# Patient Record
Sex: MALE | Race: WHITE | NOT HISPANIC OR LATINO | Employment: OTHER | URBAN - METROPOLITAN AREA
[De-identification: names, ages, dates, MRNs, and addresses within clinical notes are randomized per-mention and may not be internally consistent; named-entity substitution may affect disease eponyms.]

---

## 2017-02-22 ENCOUNTER — COMMUNICATION - HEALTHEAST (OUTPATIENT)
Dept: FAMILY MEDICINE | Facility: CLINIC | Age: 62
End: 2017-02-22

## 2017-02-28 ENCOUNTER — OFFICE VISIT - HEALTHEAST (OUTPATIENT)
Dept: FAMILY MEDICINE | Facility: CLINIC | Age: 62
End: 2017-02-28

## 2017-02-28 ENCOUNTER — COMMUNICATION - HEALTHEAST (OUTPATIENT)
Dept: FAMILY MEDICINE | Facility: CLINIC | Age: 62
End: 2017-02-28

## 2017-02-28 DIAGNOSIS — R35.0 FREQUENCY OF URINATION: ICD-10-CM

## 2017-02-28 DIAGNOSIS — E11.9 TYPE 2 DIABETES MELLITUS (H): ICD-10-CM

## 2017-02-28 DIAGNOSIS — E11.9 DM2 (DIABETES MELLITUS, TYPE 2) (H): ICD-10-CM

## 2017-02-28 DIAGNOSIS — R35.0 URINATION FREQUENCY: ICD-10-CM

## 2017-02-28 LAB
CREAT SERPL-MCNC: 2.01 MG/DL (ref 0.7–1.3)
GFR SERPL CREATININE-BSD FRML MDRD: 34 ML/MIN/1.73M2
HBA1C MFR BLD: >14 % (ref 3.5–6)

## 2017-02-28 ASSESSMENT — MIFFLIN-ST. JEOR: SCORE: 2247.22

## 2017-03-02 ENCOUNTER — HOME CARE/HOSPICE - HEALTHEAST (OUTPATIENT)
Dept: HOME HEALTH SERVICES | Facility: HOME HEALTH | Age: 62
End: 2017-03-02

## 2017-03-04 ENCOUNTER — HOME CARE/HOSPICE - HEALTHEAST (OUTPATIENT)
Dept: HOME HEALTH SERVICES | Facility: HOME HEALTH | Age: 62
End: 2017-03-04

## 2017-03-05 ENCOUNTER — COMMUNICATION - HEALTHEAST (OUTPATIENT)
Dept: FAMILY MEDICINE | Facility: CLINIC | Age: 62
End: 2017-03-05

## 2017-03-06 ENCOUNTER — AMBULATORY - HEALTHEAST (OUTPATIENT)
Dept: FAMILY MEDICINE | Facility: CLINIC | Age: 62
End: 2017-03-06

## 2017-03-06 DIAGNOSIS — E11.9 DIABETES MELLITUS (H): ICD-10-CM

## 2017-03-07 ENCOUNTER — COMMUNICATION - HEALTHEAST (OUTPATIENT)
Dept: SCHEDULING | Facility: CLINIC | Age: 62
End: 2017-03-07

## 2017-03-08 ENCOUNTER — OFFICE VISIT - HEALTHEAST (OUTPATIENT)
Dept: FAMILY MEDICINE | Facility: CLINIC | Age: 62
End: 2017-03-08

## 2017-03-08 DIAGNOSIS — L30.4 INTERTRIGO: ICD-10-CM

## 2017-03-08 DIAGNOSIS — E66.01 MORBID OBESITY, UNSPECIFIED OBESITY TYPE (H): ICD-10-CM

## 2017-03-08 DIAGNOSIS — E11.9 DIABETES MELLITUS (H): ICD-10-CM

## 2017-03-08 ASSESSMENT — MIFFLIN-ST. JEOR: SCORE: 2298.54

## 2017-03-09 ENCOUNTER — RECORDS - HEALTHEAST (OUTPATIENT)
Dept: ADMINISTRATIVE | Facility: OTHER | Age: 62
End: 2017-03-09

## 2017-03-13 ENCOUNTER — COMMUNICATION - HEALTHEAST (OUTPATIENT)
Dept: FAMILY MEDICINE | Facility: CLINIC | Age: 62
End: 2017-03-13

## 2017-03-13 ENCOUNTER — OFFICE VISIT - HEALTHEAST (OUTPATIENT)
Dept: FAMILY MEDICINE | Facility: CLINIC | Age: 62
End: 2017-03-13

## 2017-03-13 DIAGNOSIS — E11.9 NEW ONSET TYPE 2 DIABETES MELLITUS (H): ICD-10-CM

## 2017-03-13 ASSESSMENT — MIFFLIN-ST. JEOR: SCORE: 2266.78

## 2017-03-15 ENCOUNTER — COMMUNICATION - HEALTHEAST (OUTPATIENT)
Dept: FAMILY MEDICINE | Facility: CLINIC | Age: 62
End: 2017-03-15

## 2017-03-17 ENCOUNTER — OFFICE VISIT - HEALTHEAST (OUTPATIENT)
Dept: FAMILY MEDICINE | Facility: CLINIC | Age: 62
End: 2017-03-17

## 2017-03-17 ENCOUNTER — AMBULATORY - HEALTHEAST (OUTPATIENT)
Dept: ENDOCRINOLOGY | Facility: CLINIC | Age: 62
End: 2017-03-17

## 2017-03-17 ENCOUNTER — COMMUNICATION - HEALTHEAST (OUTPATIENT)
Dept: FAMILY MEDICINE | Facility: CLINIC | Age: 62
End: 2017-03-17

## 2017-03-17 DIAGNOSIS — E11.9 DIABETES (H): ICD-10-CM

## 2017-03-17 DIAGNOSIS — J32.9 SINUSITIS: ICD-10-CM

## 2017-03-17 DIAGNOSIS — R50.9 FEVER: ICD-10-CM

## 2017-03-17 DIAGNOSIS — E11.9 NEW ONSET TYPE 2 DIABETES MELLITUS (H): ICD-10-CM

## 2017-03-21 ENCOUNTER — RECORDS - HEALTHEAST (OUTPATIENT)
Dept: ADMINISTRATIVE | Facility: OTHER | Age: 62
End: 2017-03-21

## 2017-03-21 ENCOUNTER — AMBULATORY - HEALTHEAST (OUTPATIENT)
Dept: EDUCATION SERVICES | Facility: CLINIC | Age: 62
End: 2017-03-21

## 2017-03-21 DIAGNOSIS — E11.9 NEW ONSET TYPE 2 DIABETES MELLITUS (H): ICD-10-CM

## 2017-03-21 DIAGNOSIS — E11.65 TYPE 2 DIABETES MELLITUS WITH HYPERGLYCEMIA, UNSPECIFIED LONG TERM INSULIN USE STATUS: ICD-10-CM

## 2017-04-07 ENCOUNTER — OFFICE VISIT - HEALTHEAST (OUTPATIENT)
Dept: ENDOCRINOLOGY | Facility: CLINIC | Age: 62
End: 2017-04-07

## 2017-04-07 DIAGNOSIS — E11.9 NEW ONSET TYPE 2 DIABETES MELLITUS (H): ICD-10-CM

## 2017-04-07 ASSESSMENT — MIFFLIN-ST. JEOR: SCORE: 2204.19

## 2017-05-02 ENCOUNTER — OFFICE VISIT - HEALTHEAST (OUTPATIENT)
Dept: EDUCATION SERVICES | Facility: CLINIC | Age: 62
End: 2017-05-02

## 2017-05-02 DIAGNOSIS — E11.65 TYPE 2 DIABETES MELLITUS WITH HYPERGLYCEMIA, UNSPECIFIED LONG TERM INSULIN USE STATUS: ICD-10-CM

## 2017-05-02 DIAGNOSIS — E11.9 NEW ONSET TYPE 2 DIABETES MELLITUS (H): ICD-10-CM

## 2017-05-30 ENCOUNTER — OFFICE VISIT - HEALTHEAST (OUTPATIENT)
Dept: FAMILY MEDICINE | Facility: CLINIC | Age: 62
End: 2017-05-30

## 2017-05-30 DIAGNOSIS — E78.1 HYPERTRIGLYCERIDEMIA: ICD-10-CM

## 2017-05-30 DIAGNOSIS — E11.9 DIABETES (H): ICD-10-CM

## 2017-05-30 DIAGNOSIS — I10 ESSENTIAL HYPERTENSION: ICD-10-CM

## 2017-05-30 LAB
CHOLEST SERPL-MCNC: 136 MG/DL
FASTING STATUS PATIENT QL REPORTED: YES
HBA1C MFR BLD: 7.2 % (ref 3.5–6)
HDLC SERPL-MCNC: 37 MG/DL
LDLC SERPL CALC-MCNC: 86 MG/DL
TRIGL SERPL-MCNC: 65 MG/DL

## 2017-05-30 ASSESSMENT — MIFFLIN-ST. JEOR: SCORE: 2053.59

## 2017-06-03 ENCOUNTER — COMMUNICATION - HEALTHEAST (OUTPATIENT)
Dept: FAMILY MEDICINE | Facility: CLINIC | Age: 62
End: 2017-06-03

## 2017-06-03 DIAGNOSIS — I10 ESSENTIAL HYPERTENSION WITH GOAL BLOOD PRESSURE LESS THAN 130/80: ICD-10-CM

## 2017-06-05 ENCOUNTER — COMMUNICATION - HEALTHEAST (OUTPATIENT)
Dept: FAMILY MEDICINE | Facility: CLINIC | Age: 62
End: 2017-06-05

## 2017-06-20 ENCOUNTER — OFFICE VISIT - HEALTHEAST (OUTPATIENT)
Dept: EDUCATION SERVICES | Facility: CLINIC | Age: 62
End: 2017-06-20

## 2017-06-20 DIAGNOSIS — E11.9 NEW ONSET TYPE 2 DIABETES MELLITUS (H): ICD-10-CM

## 2017-06-26 ENCOUNTER — OFFICE VISIT - HEALTHEAST (OUTPATIENT)
Dept: NURSING | Facility: CLINIC | Age: 62
End: 2017-06-26

## 2017-06-26 DIAGNOSIS — I10 ESSENTIAL HYPERTENSION WITH GOAL BLOOD PRESSURE LESS THAN 140/90: ICD-10-CM

## 2017-06-26 DIAGNOSIS — E11.9 TYPE 2 DIABETES MELLITUS WITHOUT COMPLICATION, WITHOUT LONG-TERM CURRENT USE OF INSULIN (H): ICD-10-CM

## 2017-06-26 DIAGNOSIS — I10 ESSENTIAL HYPERTENSION: ICD-10-CM

## 2017-07-17 ENCOUNTER — COMMUNICATION - HEALTHEAST (OUTPATIENT)
Dept: NURSING | Facility: CLINIC | Age: 62
End: 2017-07-17

## 2017-08-07 ENCOUNTER — OFFICE VISIT - HEALTHEAST (OUTPATIENT)
Dept: NURSING | Facility: CLINIC | Age: 62
End: 2017-08-07

## 2017-08-07 DIAGNOSIS — E11.9 NEW ONSET TYPE 2 DIABETES MELLITUS (H): ICD-10-CM

## 2017-08-07 DIAGNOSIS — R80.9 MICROALBUMINURIA: ICD-10-CM

## 2017-08-29 ENCOUNTER — OFFICE VISIT - HEALTHEAST (OUTPATIENT)
Dept: FAMILY MEDICINE | Facility: CLINIC | Age: 62
End: 2017-08-29

## 2017-08-29 DIAGNOSIS — K43.9 VENTRAL HERNIA WITHOUT OBSTRUCTION OR GANGRENE: ICD-10-CM

## 2017-08-29 DIAGNOSIS — E11.9 DIABETES (H): ICD-10-CM

## 2017-08-29 DIAGNOSIS — I10 ESSENTIAL HYPERTENSION: ICD-10-CM

## 2017-08-29 LAB — HBA1C MFR BLD: 6 % (ref 3.5–6)

## 2017-08-29 ASSESSMENT — MIFFLIN-ST. JEOR: SCORE: 1826.8

## 2017-08-30 ENCOUNTER — COMMUNICATION - HEALTHEAST (OUTPATIENT)
Dept: FAMILY MEDICINE | Facility: CLINIC | Age: 62
End: 2017-08-30

## 2017-09-08 ENCOUNTER — OFFICE VISIT - HEALTHEAST (OUTPATIENT)
Dept: SURGERY | Facility: CLINIC | Age: 62
End: 2017-09-08

## 2017-09-08 DIAGNOSIS — K43.9 VENTRAL HERNIA WITHOUT OBSTRUCTION OR GANGRENE: ICD-10-CM

## 2017-09-14 ENCOUNTER — RECORDS - HEALTHEAST (OUTPATIENT)
Dept: ADMINISTRATIVE | Facility: OTHER | Age: 62
End: 2017-09-14

## 2017-10-17 ENCOUNTER — OFFICE VISIT - HEALTHEAST (OUTPATIENT)
Dept: FAMILY MEDICINE | Facility: CLINIC | Age: 62
End: 2017-10-17

## 2017-10-17 DIAGNOSIS — Z01.818 PREOP EXAMINATION: ICD-10-CM

## 2017-10-17 DIAGNOSIS — Z23 NEED FOR IMMUNIZATION AGAINST INFLUENZA: ICD-10-CM

## 2017-10-17 DIAGNOSIS — I10 ESSENTIAL HYPERTENSION: ICD-10-CM

## 2017-10-17 DIAGNOSIS — K43.9 VENTRAL HERNIA WITHOUT OBSTRUCTION OR GANGRENE: ICD-10-CM

## 2017-10-17 ASSESSMENT — MIFFLIN-ST. JEOR: SCORE: 1751.95

## 2017-11-01 ASSESSMENT — MIFFLIN-ST. JEOR: SCORE: 1741.51

## 2017-11-02 ENCOUNTER — RECORDS - HEALTHEAST (OUTPATIENT)
Dept: ADMINISTRATIVE | Facility: OTHER | Age: 62
End: 2017-11-02

## 2017-11-02 ASSESSMENT — MIFFLIN-ST. JEOR: SCORE: 1735.62

## 2017-11-03 ENCOUNTER — ANESTHESIA - HEALTHEAST (OUTPATIENT)
Dept: SURGERY | Facility: HOSPITAL | Age: 62
End: 2017-11-03

## 2017-11-03 ENCOUNTER — SURGERY - HEALTHEAST (OUTPATIENT)
Dept: SURGERY | Facility: HOSPITAL | Age: 62
End: 2017-11-03

## 2017-11-04 ASSESSMENT — MIFFLIN-ST. JEOR: SCORE: 1765.56

## 2017-11-08 ENCOUNTER — AMBULATORY - HEALTHEAST (OUTPATIENT)
Dept: SURGERY | Facility: CLINIC | Age: 62
End: 2017-11-08

## 2017-11-08 DIAGNOSIS — R11.0 NAUSEA IN ADULT: ICD-10-CM

## 2017-11-13 ENCOUNTER — OFFICE VISIT - HEALTHEAST (OUTPATIENT)
Dept: SURGERY | Facility: CLINIC | Age: 62
End: 2017-11-13

## 2017-11-13 DIAGNOSIS — R19.5 STOOL DISCOLORATION: ICD-10-CM

## 2017-11-13 DIAGNOSIS — Z98.890 S/P REPAIR OF VENTRAL HERNIA: ICD-10-CM

## 2017-11-13 DIAGNOSIS — Z87.19 S/P REPAIR OF VENTRAL HERNIA: ICD-10-CM

## 2017-11-17 ENCOUNTER — OFFICE VISIT - HEALTHEAST (OUTPATIENT)
Dept: SURGERY | Facility: CLINIC | Age: 62
End: 2017-11-17

## 2017-11-17 DIAGNOSIS — Z48.89 POSTOPERATIVE VISIT: ICD-10-CM

## 2017-11-17 ASSESSMENT — MIFFLIN-ST. JEOR: SCORE: 1765.56

## 2017-12-13 ENCOUNTER — OFFICE VISIT - HEALTHEAST (OUTPATIENT)
Dept: FAMILY MEDICINE | Facility: CLINIC | Age: 62
End: 2017-12-13

## 2017-12-13 DIAGNOSIS — E11.9 DIABETES (H): ICD-10-CM

## 2017-12-13 DIAGNOSIS — K56.609 SMALL BOWEL OBSTRUCTION (H): ICD-10-CM

## 2017-12-13 DIAGNOSIS — K43.9 VENTRAL HERNIA: ICD-10-CM

## 2017-12-13 ASSESSMENT — MIFFLIN-ST. JEOR: SCORE: 1751.95

## 2017-12-27 ENCOUNTER — COMMUNICATION - HEALTHEAST (OUTPATIENT)
Dept: FAMILY MEDICINE | Facility: CLINIC | Age: 62
End: 2017-12-27

## 2018-02-05 ENCOUNTER — COMMUNICATION - HEALTHEAST (OUTPATIENT)
Dept: FAMILY MEDICINE | Facility: CLINIC | Age: 63
End: 2018-02-05

## 2018-02-05 ENCOUNTER — OFFICE VISIT - HEALTHEAST (OUTPATIENT)
Dept: FAMILY MEDICINE | Facility: CLINIC | Age: 63
End: 2018-02-05

## 2018-02-05 DIAGNOSIS — I10 ESSENTIAL HYPERTENSION: ICD-10-CM

## 2018-02-05 DIAGNOSIS — E11.9 DIABETES (H): ICD-10-CM

## 2018-02-05 DIAGNOSIS — E11.8 TYPE 2 DIABETES MELLITUS WITH COMPLICATION, WITHOUT LONG-TERM CURRENT USE OF INSULIN (H): ICD-10-CM

## 2018-02-05 LAB — HBA1C MFR BLD: 5.7 % (ref 3.5–6)

## 2018-02-05 ASSESSMENT — MIFFLIN-ST. JEOR: SCORE: 1751.95

## 2018-03-09 ENCOUNTER — COMMUNICATION - HEALTHEAST (OUTPATIENT)
Dept: FAMILY MEDICINE | Facility: CLINIC | Age: 63
End: 2018-03-09

## 2018-03-19 ENCOUNTER — OFFICE VISIT - HEALTHEAST (OUTPATIENT)
Dept: FAMILY MEDICINE | Facility: CLINIC | Age: 63
End: 2018-03-19

## 2018-03-19 DIAGNOSIS — E11.8 TYPE 2 DIABETES MELLITUS WITH COMPLICATION, WITHOUT LONG-TERM CURRENT USE OF INSULIN (H): ICD-10-CM

## 2018-03-19 DIAGNOSIS — L03.115 CELLULITIS OF RIGHT LOWER EXTREMITY: ICD-10-CM

## 2018-03-19 ASSESSMENT — MIFFLIN-ST. JEOR: SCORE: 1772.36

## 2018-03-24 ENCOUNTER — AMBULATORY - HEALTHEAST (OUTPATIENT)
Dept: FAMILY MEDICINE | Facility: CLINIC | Age: 63
End: 2018-03-24

## 2018-03-24 DIAGNOSIS — L03.90 CELLULITIS: ICD-10-CM

## 2018-03-26 ENCOUNTER — OFFICE VISIT - HEALTHEAST (OUTPATIENT)
Dept: FAMILY MEDICINE | Facility: CLINIC | Age: 63
End: 2018-03-26

## 2018-03-26 DIAGNOSIS — E11.8 TYPE 2 DIABETES MELLITUS WITH COMPLICATION, WITHOUT LONG-TERM CURRENT USE OF INSULIN (H): ICD-10-CM

## 2018-03-26 DIAGNOSIS — L03.115 CELLULITIS OF RIGHT LOWER EXTREMITY: ICD-10-CM

## 2018-03-26 DIAGNOSIS — M79.604 PAIN OF RIGHT LOWER EXTREMITY: ICD-10-CM

## 2018-03-26 ASSESSMENT — MIFFLIN-ST. JEOR: SCORE: 1785.97

## 2018-03-31 ENCOUNTER — COMMUNICATION - HEALTHEAST (OUTPATIENT)
Dept: FAMILY MEDICINE | Facility: CLINIC | Age: 63
End: 2018-03-31

## 2018-03-31 DIAGNOSIS — I10 HYPERTENSION: ICD-10-CM

## 2018-04-04 ENCOUNTER — OFFICE VISIT - HEALTHEAST (OUTPATIENT)
Dept: FAMILY MEDICINE | Facility: CLINIC | Age: 63
End: 2018-04-04

## 2018-04-04 DIAGNOSIS — L03.115 CELLULITIS OF RIGHT LOWER EXTREMITY: ICD-10-CM

## 2018-04-04 ASSESSMENT — MIFFLIN-ST. JEOR: SCORE: 1776.9

## 2018-04-06 ENCOUNTER — COMMUNICATION - HEALTHEAST (OUTPATIENT)
Dept: FAMILY MEDICINE | Facility: CLINIC | Age: 63
End: 2018-04-06

## 2018-04-06 ENCOUNTER — HOSPITAL ENCOUNTER (OUTPATIENT)
Dept: RADIOLOGY | Facility: CLINIC | Age: 63
Discharge: HOME | End: 2018-04-06
Attending: FAMILY MEDICINE

## 2018-04-06 ENCOUNTER — HOSPITAL ENCOUNTER (OUTPATIENT)
Dept: ULTRASOUND IMAGING | Facility: CLINIC | Age: 63
Discharge: HOME | End: 2018-04-06
Attending: FAMILY MEDICINE

## 2018-04-06 ENCOUNTER — OFFICE VISIT - HEALTHEAST (OUTPATIENT)
Dept: FAMILY MEDICINE | Facility: CLINIC | Age: 63
End: 2018-04-06

## 2018-04-06 DIAGNOSIS — M79.661 PAIN AND SWELLING OF LOWER LEG, RIGHT: ICD-10-CM

## 2018-04-06 DIAGNOSIS — M79.89 PAIN AND SWELLING OF LOWER LEG, RIGHT: ICD-10-CM

## 2018-04-06 DIAGNOSIS — M79.89 SWELLING OF TOE OF RIGHT FOOT: ICD-10-CM

## 2018-04-06 DIAGNOSIS — L03.115 CELLULITIS OF RIGHT LEG: ICD-10-CM

## 2018-04-06 ASSESSMENT — MIFFLIN-ST. JEOR: SCORE: 1780.3

## 2018-06-18 ENCOUNTER — COMMUNICATION - HEALTHEAST (OUTPATIENT)
Dept: NURSING | Facility: CLINIC | Age: 63
End: 2018-06-18

## 2018-06-18 DIAGNOSIS — E11.9 TYPE 2 DIABETES MELLITUS WITHOUT COMPLICATION, WITHOUT LONG-TERM CURRENT USE OF INSULIN (H): ICD-10-CM

## 2018-06-26 ENCOUNTER — COMMUNICATION - HEALTHEAST (OUTPATIENT)
Dept: FAMILY MEDICINE | Facility: CLINIC | Age: 63
End: 2018-06-26

## 2018-06-26 DIAGNOSIS — E11.9 DM2 (DIABETES MELLITUS, TYPE 2) (H): ICD-10-CM

## 2018-08-04 ENCOUNTER — COMMUNICATION - HEALTHEAST (OUTPATIENT)
Dept: FAMILY MEDICINE | Facility: CLINIC | Age: 63
End: 2018-08-04

## 2018-08-04 DIAGNOSIS — E11.9 DM2 (DIABETES MELLITUS, TYPE 2) (H): ICD-10-CM

## 2018-10-02 ENCOUNTER — OFFICE VISIT - HEALTHEAST (OUTPATIENT)
Dept: FAMILY MEDICINE | Facility: CLINIC | Age: 63
End: 2018-10-02

## 2018-10-02 DIAGNOSIS — E78.5 HYPERLIPIDEMIA LDL GOAL <100: ICD-10-CM

## 2018-10-02 DIAGNOSIS — E11.9 DIABETES MELLITUS, TYPE 2 (H): ICD-10-CM

## 2018-10-02 DIAGNOSIS — E55.9 VITAMIN D DEFICIENCY: ICD-10-CM

## 2018-10-02 DIAGNOSIS — Z23 NEED FOR IMMUNIZATION AGAINST INFLUENZA: ICD-10-CM

## 2018-10-02 LAB
25(OH)D3 SERPL-MCNC: 32.8 NG/ML (ref 30–80)
ALBUMIN SERPL-MCNC: 4.3 G/DL (ref 3.5–5)
ALP SERPL-CCNC: 76 U/L (ref 45–120)
ALT SERPL W P-5'-P-CCNC: 16 U/L (ref 0–45)
ANION GAP SERPL CALCULATED.3IONS-SCNC: 9 MMOL/L (ref 5–18)
AST SERPL W P-5'-P-CCNC: 18 U/L (ref 0–40)
BILIRUB SERPL-MCNC: 0.4 MG/DL (ref 0–1)
BUN SERPL-MCNC: 20 MG/DL (ref 8–22)
CALCIUM SERPL-MCNC: 9.4 MG/DL (ref 8.5–10.5)
CHLORIDE BLD-SCNC: 108 MMOL/L (ref 98–107)
CHOLEST SERPL-MCNC: 136 MG/DL
CO2 SERPL-SCNC: 22 MMOL/L (ref 22–31)
CREAT SERPL-MCNC: 1.06 MG/DL (ref 0.7–1.3)
FASTING STATUS PATIENT QL REPORTED: YES
GFR SERPL CREATININE-BSD FRML MDRD: >60 ML/MIN/1.73M2
GLUCOSE BLD-MCNC: 98 MG/DL (ref 70–125)
HBA1C MFR BLD: 5.9 % (ref 3.5–6)
HDLC SERPL-MCNC: 51 MG/DL
LDLC SERPL CALC-MCNC: 74 MG/DL
POTASSIUM BLD-SCNC: 5.1 MMOL/L (ref 3.5–5)
PROT SERPL-MCNC: 7.1 G/DL (ref 6–8)
SODIUM SERPL-SCNC: 139 MMOL/L (ref 136–145)
TRIGL SERPL-MCNC: 53 MG/DL

## 2018-10-02 ASSESSMENT — MIFFLIN-ST. JEOR: SCORE: 1799.58

## 2018-10-03 ENCOUNTER — COMMUNICATION - HEALTHEAST (OUTPATIENT)
Dept: FAMILY MEDICINE | Facility: CLINIC | Age: 63
End: 2018-10-03

## 2019-03-13 ENCOUNTER — COMMUNICATION - HEALTHEAST (OUTPATIENT)
Dept: FAMILY MEDICINE | Facility: CLINIC | Age: 64
End: 2019-03-13

## 2019-03-19 ENCOUNTER — COMMUNICATION - HEALTHEAST (OUTPATIENT)
Dept: FAMILY MEDICINE | Facility: CLINIC | Age: 64
End: 2019-03-19

## 2019-03-19 DIAGNOSIS — I10 HYPERTENSION: ICD-10-CM

## 2019-05-28 ENCOUNTER — OFFICE VISIT - HEALTHEAST (OUTPATIENT)
Dept: FAMILY MEDICINE | Facility: CLINIC | Age: 64
End: 2019-05-28

## 2019-05-28 DIAGNOSIS — Z23 IMMUNIZATION DUE: ICD-10-CM

## 2019-05-28 DIAGNOSIS — I10 BENIGN ESSENTIAL HTN: ICD-10-CM

## 2019-05-28 DIAGNOSIS — E66.812 CLASS 2 SEVERE OBESITY DUE TO EXCESS CALORIES WITH SERIOUS COMORBIDITY AND BODY MASS INDEX (BMI) OF 35.0 TO 35.9 IN ADULT (H): ICD-10-CM

## 2019-05-28 DIAGNOSIS — E78.5 DYSLIPIDEMIA: ICD-10-CM

## 2019-05-28 DIAGNOSIS — E66.01 CLASS 2 SEVERE OBESITY DUE TO EXCESS CALORIES WITH SERIOUS COMORBIDITY AND BODY MASS INDEX (BMI) OF 35.0 TO 35.9 IN ADULT (H): ICD-10-CM

## 2019-05-28 DIAGNOSIS — E11.9 TYPE 2 DIABETES MELLITUS TREATED WITHOUT INSULIN (H): ICD-10-CM

## 2019-05-28 DIAGNOSIS — Z86.711 HISTORY OF PULMONARY EMBOLISM: ICD-10-CM

## 2019-05-28 DIAGNOSIS — L84 CORN OR CALLUS: ICD-10-CM

## 2019-05-28 DIAGNOSIS — M21.612 BUNION, LEFT: ICD-10-CM

## 2019-05-28 DIAGNOSIS — L84 CALLUS OF FOOT: ICD-10-CM

## 2019-05-28 DIAGNOSIS — E11.8 TYPE 2 DIABETES MELLITUS WITH COMPLICATION, WITHOUT LONG-TERM CURRENT USE OF INSULIN (H): ICD-10-CM

## 2019-05-28 LAB
CHOLEST SERPL-MCNC: 149 MG/DL
CREAT UR-MCNC: 58.5 MG/DL
FASTING STATUS PATIENT QL REPORTED: YES
HBA1C MFR BLD: 6 % (ref 3.5–6)
HDLC SERPL-MCNC: 50 MG/DL
LDLC SERPL CALC-MCNC: 85 MG/DL
MICROALBUMIN UR-MCNC: 1.18 MG/DL (ref 0–1.99)
MICROALBUMIN/CREAT UR: 20.2 MG/G
TRIGL SERPL-MCNC: 69 MG/DL

## 2019-05-28 ASSESSMENT — MIFFLIN-ST. JEOR: SCORE: 1866.56

## 2019-06-10 ENCOUNTER — COMMUNICATION - HEALTHEAST (OUTPATIENT)
Dept: ADMINISTRATIVE | Facility: CLINIC | Age: 64
End: 2019-06-10

## 2019-06-16 ENCOUNTER — COMMUNICATION - HEALTHEAST (OUTPATIENT)
Dept: FAMILY MEDICINE | Facility: CLINIC | Age: 64
End: 2019-06-16

## 2019-06-16 DIAGNOSIS — I10 HYPERTENSION: ICD-10-CM

## 2019-06-25 ENCOUNTER — RECORDS - HEALTHEAST (OUTPATIENT)
Dept: FAMILY MEDICINE | Facility: CLINIC | Age: 64
End: 2019-06-25

## 2019-07-09 ENCOUNTER — COMMUNICATION - HEALTHEAST (OUTPATIENT)
Dept: FAMILY MEDICINE | Facility: CLINIC | Age: 64
End: 2019-07-09

## 2019-07-09 DIAGNOSIS — I10 HYPERTENSION: ICD-10-CM

## 2019-07-09 DIAGNOSIS — E11.9 TYPE 2 DIABETES MELLITUS WITHOUT COMPLICATION, WITHOUT LONG-TERM CURRENT USE OF INSULIN (H): ICD-10-CM

## 2019-08-06 ENCOUNTER — COMMUNICATION - HEALTHEAST (OUTPATIENT)
Dept: FAMILY MEDICINE | Facility: CLINIC | Age: 64
End: 2019-08-06

## 2019-09-17 ENCOUNTER — COMMUNICATION - HEALTHEAST (OUTPATIENT)
Dept: FAMILY MEDICINE | Facility: CLINIC | Age: 64
End: 2019-09-17

## 2019-10-23 ENCOUNTER — RECORDS - HEALTHEAST (OUTPATIENT)
Dept: ADMINISTRATIVE | Facility: OTHER | Age: 64
End: 2019-10-23

## 2019-10-28 ENCOUNTER — RECORDS - HEALTHEAST (OUTPATIENT)
Dept: HEALTH INFORMATION MANAGEMENT | Facility: CLINIC | Age: 64
End: 2019-10-28

## 2019-11-05 ENCOUNTER — OFFICE VISIT - HEALTHEAST (OUTPATIENT)
Dept: FAMILY MEDICINE | Facility: CLINIC | Age: 64
End: 2019-11-05

## 2019-11-05 ENCOUNTER — COMMUNICATION - HEALTHEAST (OUTPATIENT)
Dept: FAMILY MEDICINE | Facility: CLINIC | Age: 64
End: 2019-11-05

## 2019-11-05 DIAGNOSIS — E66.01 CLASS 2 SEVERE OBESITY DUE TO EXCESS CALORIES WITH SERIOUS COMORBIDITY AND BODY MASS INDEX (BMI) OF 35.0 TO 35.9 IN ADULT (H): ICD-10-CM

## 2019-11-05 DIAGNOSIS — E11.8 TYPE 2 DIABETES MELLITUS WITH COMPLICATION, WITHOUT LONG-TERM CURRENT USE OF INSULIN (H): ICD-10-CM

## 2019-11-05 DIAGNOSIS — I10 BENIGN ESSENTIAL HTN: ICD-10-CM

## 2019-11-05 DIAGNOSIS — E66.812 CLASS 2 SEVERE OBESITY DUE TO EXCESS CALORIES WITH SERIOUS COMORBIDITY AND BODY MASS INDEX (BMI) OF 35.0 TO 35.9 IN ADULT (H): ICD-10-CM

## 2019-11-05 DIAGNOSIS — E11.9 TYPE 2 DIABETES MELLITUS TREATED WITHOUT INSULIN (H): ICD-10-CM

## 2019-11-05 LAB
ANION GAP SERPL CALCULATED.3IONS-SCNC: 7 MMOL/L (ref 5–18)
BASOPHILS # BLD AUTO: 0 THOU/UL (ref 0–0.2)
BASOPHILS NFR BLD AUTO: 1 % (ref 0–2)
BUN SERPL-MCNC: 25 MG/DL (ref 8–22)
CALCIUM SERPL-MCNC: 9.6 MG/DL (ref 8.5–10.5)
CHLORIDE BLD-SCNC: 104 MMOL/L (ref 98–107)
CO2 SERPL-SCNC: 28 MMOL/L (ref 22–31)
CREAT SERPL-MCNC: 1.11 MG/DL (ref 0.7–1.3)
EOSINOPHIL # BLD AUTO: 0.1 THOU/UL (ref 0–0.4)
EOSINOPHIL NFR BLD AUTO: 2 % (ref 0–6)
ERYTHROCYTE [DISTWIDTH] IN BLOOD BY AUTOMATED COUNT: 13.6 % (ref 11–14.5)
GFR SERPL CREATININE-BSD FRML MDRD: >60 ML/MIN/1.73M2
GLUCOSE BLD-MCNC: 95 MG/DL (ref 70–125)
HBA1C MFR BLD: 6.2 % (ref 3.5–6)
HCT VFR BLD AUTO: 44.1 % (ref 40–54)
HGB BLD-MCNC: 14.5 G/DL (ref 14–18)
LYMPHOCYTES # BLD AUTO: 1.5 THOU/UL (ref 0.8–4.4)
LYMPHOCYTES NFR BLD AUTO: 25 % (ref 20–40)
MCH RBC QN AUTO: 27.4 PG (ref 27–34)
MCHC RBC AUTO-ENTMCNC: 32.9 G/DL (ref 32–36)
MCV RBC AUTO: 83 FL (ref 80–100)
MONOCYTES # BLD AUTO: 0.5 THOU/UL (ref 0–0.9)
MONOCYTES NFR BLD AUTO: 8 % (ref 2–10)
NEUTROPHILS # BLD AUTO: 4 THOU/UL (ref 2–7.7)
NEUTROPHILS NFR BLD AUTO: 65 % (ref 50–70)
PLATELET # BLD AUTO: 217 THOU/UL (ref 140–440)
PMV BLD AUTO: 7.2 FL (ref 7–10)
POTASSIUM BLD-SCNC: 5 MMOL/L (ref 3.5–5)
RBC # BLD AUTO: 5.29 MILL/UL (ref 4.4–6.2)
SODIUM SERPL-SCNC: 139 MMOL/L (ref 136–145)
WBC: 6.2 THOU/UL (ref 4–11)

## 2019-11-05 ASSESSMENT — MIFFLIN-ST. JEOR: SCORE: 1928.86

## 2019-11-06 ENCOUNTER — COMMUNICATION - HEALTHEAST (OUTPATIENT)
Dept: FAMILY MEDICINE | Facility: CLINIC | Age: 64
End: 2019-11-06

## 2019-11-06 LAB
HCV AB SERPL QL IA: NEGATIVE
HIV 1+2 AB+HIV1 P24 AG SERPL QL IA: NEGATIVE

## 2019-12-20 ENCOUNTER — COMMUNICATION - HEALTHEAST (OUTPATIENT)
Dept: FAMILY MEDICINE | Facility: CLINIC | Age: 64
End: 2019-12-20

## 2019-12-20 DIAGNOSIS — E11.9 DM2 (DIABETES MELLITUS, TYPE 2) (H): ICD-10-CM

## 2019-12-30 ENCOUNTER — COMMUNICATION - HEALTHEAST (OUTPATIENT)
Dept: FAMILY MEDICINE | Facility: CLINIC | Age: 64
End: 2019-12-30

## 2019-12-30 DIAGNOSIS — E11.8 TYPE 2 DIABETES MELLITUS WITH COMPLICATION, WITHOUT LONG-TERM CURRENT USE OF INSULIN (H): ICD-10-CM

## 2019-12-30 DIAGNOSIS — I10 BENIGN ESSENTIAL HTN: ICD-10-CM

## 2020-01-08 ENCOUNTER — COMMUNICATION - HEALTHEAST (OUTPATIENT)
Dept: SCHEDULING | Facility: CLINIC | Age: 65
End: 2020-01-08

## 2020-01-08 DIAGNOSIS — E11.9 DM2 (DIABETES MELLITUS, TYPE 2) (H): ICD-10-CM

## 2020-01-27 ENCOUNTER — COMMUNICATION - HEALTHEAST (OUTPATIENT)
Dept: FAMILY MEDICINE | Facility: CLINIC | Age: 65
End: 2020-01-27

## 2020-01-27 DIAGNOSIS — Z12.11 SPECIAL SCREENING FOR MALIGNANT NEOPLASMS, COLON: ICD-10-CM

## 2020-01-31 ENCOUNTER — RECORDS - HEALTHEAST (OUTPATIENT)
Dept: ADMINISTRATIVE | Facility: OTHER | Age: 65
End: 2020-01-31

## 2020-02-05 ENCOUNTER — AMBULATORY - HEALTHEAST (OUTPATIENT)
Dept: NURSING | Facility: CLINIC | Age: 65
End: 2020-02-05

## 2020-02-05 ENCOUNTER — AMBULATORY - HEALTHEAST (OUTPATIENT)
Dept: LAB | Facility: CLINIC | Age: 65
End: 2020-02-05

## 2020-02-05 DIAGNOSIS — E11.8 TYPE 2 DIABETES MELLITUS WITH COMPLICATION, WITHOUT LONG-TERM CURRENT USE OF INSULIN (H): ICD-10-CM

## 2020-02-05 LAB — HBA1C MFR BLD: 5.9 % (ref 3.5–6)

## 2020-02-06 ENCOUNTER — COMMUNICATION - HEALTHEAST (OUTPATIENT)
Dept: FAMILY MEDICINE | Facility: CLINIC | Age: 65
End: 2020-02-06

## 2020-06-18 ENCOUNTER — COMMUNICATION - HEALTHEAST (OUTPATIENT)
Dept: FAMILY MEDICINE | Facility: CLINIC | Age: 65
End: 2020-06-18

## 2020-06-18 DIAGNOSIS — E11.9 TYPE 2 DIABETES MELLITUS WITHOUT COMPLICATION, WITHOUT LONG-TERM CURRENT USE OF INSULIN (H): ICD-10-CM

## 2020-11-03 ENCOUNTER — OFFICE VISIT - HEALTHEAST (OUTPATIENT)
Dept: FAMILY MEDICINE | Facility: CLINIC | Age: 65
End: 2020-11-03

## 2020-11-03 DIAGNOSIS — E11.8 TYPE 2 DIABETES MELLITUS WITH COMPLICATION, WITHOUT LONG-TERM CURRENT USE OF INSULIN (H): ICD-10-CM

## 2020-11-03 DIAGNOSIS — I10 BENIGN ESSENTIAL HTN: ICD-10-CM

## 2020-11-03 DIAGNOSIS — R35.0 URINE FREQUENCY: ICD-10-CM

## 2020-11-03 DIAGNOSIS — R35.1 NOCTURIA: ICD-10-CM

## 2020-11-03 DIAGNOSIS — E78.5 DYSLIPIDEMIA: ICD-10-CM

## 2020-11-03 DIAGNOSIS — Z12.5 SCREENING FOR PROSTATE CANCER: ICD-10-CM

## 2020-11-03 DIAGNOSIS — R31.29 MICROSCOPIC HEMATURIA: ICD-10-CM

## 2020-11-03 LAB
ALBUMIN SERPL-MCNC: 4.5 G/DL (ref 3.5–5)
ALBUMIN UR-MCNC: NEGATIVE MG/DL
ALP SERPL-CCNC: 89 U/L (ref 45–120)
ALT SERPL W P-5'-P-CCNC: 15 U/L (ref 0–45)
ANION GAP SERPL CALCULATED.3IONS-SCNC: 10 MMOL/L (ref 5–18)
APPEARANCE UR: CLEAR
AST SERPL W P-5'-P-CCNC: 22 U/L (ref 0–40)
BACTERIA #/AREA URNS HPF: ABNORMAL HPF
BILIRUB SERPL-MCNC: 0.4 MG/DL (ref 0–1)
BILIRUB UR QL STRIP: NEGATIVE
BUN SERPL-MCNC: 31 MG/DL (ref 8–22)
CALCIUM SERPL-MCNC: 9 MG/DL (ref 8.5–10.5)
CHLORIDE BLD-SCNC: 104 MMOL/L (ref 98–107)
CHOLEST SERPL-MCNC: 143 MG/DL
CO2 SERPL-SCNC: 26 MMOL/L (ref 22–31)
COLOR UR AUTO: YELLOW
CREAT SERPL-MCNC: 1.16 MG/DL (ref 0.7–1.3)
CREAT UR-MCNC: 96.8 MG/DL
ERYTHROCYTE [DISTWIDTH] IN BLOOD BY AUTOMATED COUNT: 13.2 % (ref 11–14.5)
FASTING STATUS PATIENT QL REPORTED: YES
GFR SERPL CREATININE-BSD FRML MDRD: >60 ML/MIN/1.73M2
GLUCOSE BLD-MCNC: 84 MG/DL (ref 70–125)
GLUCOSE UR STRIP-MCNC: NEGATIVE MG/DL
HBA1C MFR BLD: 6 %
HCT VFR BLD AUTO: 40.6 % (ref 40–54)
HDLC SERPL-MCNC: 59 MG/DL
HGB BLD-MCNC: 13.5 G/DL (ref 14–18)
HGB UR QL STRIP: ABNORMAL
KETONES UR STRIP-MCNC: NEGATIVE MG/DL
LDLC SERPL CALC-MCNC: 75 MG/DL
LEUKOCYTE ESTERASE UR QL STRIP: NEGATIVE
MCH RBC QN AUTO: 27.9 PG (ref 27–34)
MCHC RBC AUTO-ENTMCNC: 33.3 G/DL (ref 32–36)
MCV RBC AUTO: 84 FL (ref 80–100)
MICROALBUMIN UR-MCNC: 1.37 MG/DL (ref 0–1.99)
MICROALBUMIN/CREAT UR: 14.2 MG/G
MUCOUS THREADS #/AREA URNS LPF: ABNORMAL LPF
NITRATE UR QL: NEGATIVE
PH UR STRIP: 5.5 [PH] (ref 5–8)
PLATELET # BLD AUTO: 212 THOU/UL (ref 140–440)
PMV BLD AUTO: 7.4 FL (ref 7–10)
POTASSIUM BLD-SCNC: 4.8 MMOL/L (ref 3.5–5)
PROT SERPL-MCNC: 7.3 G/DL (ref 6–8)
RBC # BLD AUTO: 4.84 MILL/UL (ref 4.4–6.2)
RBC #/AREA URNS AUTO: ABNORMAL HPF
SODIUM SERPL-SCNC: 140 MMOL/L (ref 136–145)
SP GR UR STRIP: 1.02 (ref 1–1.03)
SQUAMOUS #/AREA URNS AUTO: ABNORMAL LPF
TRIGL SERPL-MCNC: 45 MG/DL
UROBILINOGEN UR STRIP-ACNC: ABNORMAL
WBC #/AREA URNS AUTO: ABNORMAL HPF
WBC: 6 THOU/UL (ref 4–11)

## 2020-11-04 LAB — PSA SERPL-MCNC: 0.6 NG/ML (ref 0–4.5)

## 2020-11-27 ENCOUNTER — RECORDS - HEALTHEAST (OUTPATIENT)
Dept: ADMINISTRATIVE | Facility: OTHER | Age: 65
End: 2020-11-27

## 2020-12-26 ENCOUNTER — COMMUNICATION - HEALTHEAST (OUTPATIENT)
Dept: FAMILY MEDICINE | Facility: CLINIC | Age: 65
End: 2020-12-26

## 2020-12-26 DIAGNOSIS — E11.9 TYPE 2 DIABETES MELLITUS WITHOUT COMPLICATION, WITHOUT LONG-TERM CURRENT USE OF INSULIN (H): ICD-10-CM

## 2020-12-28 ENCOUNTER — RECORDS - HEALTHEAST (OUTPATIENT)
Dept: ADMINISTRATIVE | Facility: OTHER | Age: 65
End: 2020-12-28

## 2021-01-25 ENCOUNTER — COMMUNICATION - HEALTHEAST (OUTPATIENT)
Dept: FAMILY MEDICINE | Facility: CLINIC | Age: 66
End: 2021-01-25

## 2021-01-26 ENCOUNTER — COMMUNICATION - HEALTHEAST (OUTPATIENT)
Dept: FAMILY MEDICINE | Facility: CLINIC | Age: 66
End: 2021-01-26

## 2021-01-26 DIAGNOSIS — E11.8 TYPE 2 DIABETES MELLITUS WITH COMPLICATION, WITHOUT LONG-TERM CURRENT USE OF INSULIN (H): ICD-10-CM

## 2021-02-06 ENCOUNTER — HEALTH MAINTENANCE LETTER (OUTPATIENT)
Age: 66
End: 2021-02-06

## 2021-02-14 ENCOUNTER — COMMUNICATION - HEALTHEAST (OUTPATIENT)
Dept: FAMILY MEDICINE | Facility: CLINIC | Age: 66
End: 2021-02-14

## 2021-02-14 DIAGNOSIS — I10 BENIGN ESSENTIAL HTN: ICD-10-CM

## 2021-02-18 ENCOUNTER — COMMUNICATION - HEALTHEAST (OUTPATIENT)
Dept: SCHEDULING | Facility: CLINIC | Age: 66
End: 2021-02-18

## 2021-02-18 ENCOUNTER — OFFICE VISIT - HEALTHEAST (OUTPATIENT)
Dept: FAMILY MEDICINE | Facility: CLINIC | Age: 66
End: 2021-02-18

## 2021-02-18 DIAGNOSIS — E11.9 TYPE 2 DIABETES MELLITUS WITHOUT COMPLICATION, WITHOUT LONG-TERM CURRENT USE OF INSULIN (H): ICD-10-CM

## 2021-02-18 DIAGNOSIS — R50.9 FEVER, UNSPECIFIED FEVER CAUSE: ICD-10-CM

## 2021-02-18 DIAGNOSIS — Z20.822 SUSPECTED 2019 NOVEL CORONAVIRUS INFECTION: ICD-10-CM

## 2021-02-18 LAB
FLUAV AG SPEC QL IA: NORMAL
FLUBV AG SPEC QL IA: NORMAL

## 2021-02-19 ENCOUNTER — COMMUNICATION - HEALTHEAST (OUTPATIENT)
Dept: FAMILY MEDICINE | Facility: CLINIC | Age: 66
End: 2021-02-19

## 2021-02-20 ENCOUNTER — COMMUNICATION - HEALTHEAST (OUTPATIENT)
Dept: CARE COORDINATION | Facility: HOSPITAL | Age: 66
End: 2021-02-20

## 2021-02-20 ENCOUNTER — COMMUNICATION - HEALTHEAST (OUTPATIENT)
Dept: SCHEDULING | Facility: CLINIC | Age: 66
End: 2021-02-20

## 2021-03-30 ENCOUNTER — COMMUNICATION - HEALTHEAST (OUTPATIENT)
Dept: FAMILY MEDICINE | Facility: CLINIC | Age: 66
End: 2021-03-30

## 2021-04-28 ENCOUNTER — OFFICE VISIT - HEALTHEAST (OUTPATIENT)
Dept: FAMILY MEDICINE | Facility: CLINIC | Age: 66
End: 2021-04-28

## 2021-04-28 DIAGNOSIS — E11.9 TYPE 2 DIABETES MELLITUS TREATED WITHOUT INSULIN (H): ICD-10-CM

## 2021-04-28 DIAGNOSIS — R07.9 LEFT-SIDED CHEST PAIN: ICD-10-CM

## 2021-04-28 DIAGNOSIS — R01.1 HEART MURMUR: ICD-10-CM

## 2021-04-28 LAB — HBA1C MFR BLD: 5.9 %

## 2021-04-28 ASSESSMENT — MIFFLIN-ST. JEOR: SCORE: 1808.73

## 2021-05-23 ENCOUNTER — HEALTH MAINTENANCE LETTER (OUTPATIENT)
Age: 66
End: 2021-05-23

## 2021-05-25 ENCOUNTER — OFFICE VISIT - HEALTHEAST (OUTPATIENT)
Dept: FAMILY MEDICINE | Facility: CLINIC | Age: 66
End: 2021-05-25

## 2021-05-25 DIAGNOSIS — E11.9 TYPE 2 DIABETES MELLITUS TREATED WITHOUT INSULIN (H): ICD-10-CM

## 2021-05-25 DIAGNOSIS — E11.8 TYPE 2 DIABETES MELLITUS WITH COMPLICATION, WITHOUT LONG-TERM CURRENT USE OF INSULIN (H): ICD-10-CM

## 2021-05-25 DIAGNOSIS — E66.812 CLASS 2 SEVERE OBESITY DUE TO EXCESS CALORIES WITH SERIOUS COMORBIDITY AND BODY MASS INDEX (BMI) OF 35.0 TO 35.9 IN ADULT (H): ICD-10-CM

## 2021-05-25 DIAGNOSIS — E78.5 DYSLIPIDEMIA: ICD-10-CM

## 2021-05-25 DIAGNOSIS — L84 CALLUS OF FOOT: ICD-10-CM

## 2021-05-25 DIAGNOSIS — E66.01 CLASS 2 SEVERE OBESITY DUE TO EXCESS CALORIES WITH SERIOUS COMORBIDITY AND BODY MASS INDEX (BMI) OF 35.0 TO 35.9 IN ADULT (H): ICD-10-CM

## 2021-05-25 ASSESSMENT — MIFFLIN-ST. JEOR: SCORE: 1771.23

## 2021-05-27 VITALS
RESPIRATION RATE: 21 BRPM | DIASTOLIC BLOOD PRESSURE: 69 MMHG | OXYGEN SATURATION: 97 % | SYSTOLIC BLOOD PRESSURE: 110 MMHG | TEMPERATURE: 99.7 F | HEART RATE: 92 BPM

## 2021-05-29 NOTE — TELEPHONE ENCOUNTER
Refill Approved    Rx renewed per Medication Renewal Policy. Medication was last renewed on 3/21/19.    Natalia Burnette, [facility] Triage/Med Refill 6/17/2019     Requested Prescriptions   Pending Prescriptions Disp Refills     lisinopril (PRINIVIL,ZESTRIL) 20 MG tablet [Pharmacy Med Name: Lisinopril Oral Tablet 20 MG] 90 tablet 0     Sig: Take 1 tablet (20 mg total) by mouth daily.       Ace Inhibitors Refill Protocol Passed - 6/16/2019  7:01 AM        Passed - PCP or prescribing provider visit in past 12 months       Last office visit with prescriber/PCP: 4/6/2018 Tiesha Emmanuel MD OR same dept: 5/28/2019 Martine Ng MD OR same specialty: 5/28/2019 Martine Ng MD  Last physical: Visit date not found Last MTM visit: Visit date not found   Next visit within 3 mo: Visit date not found  Next physical within 3 mo: Visit date not found  Prescriber OR PCP: Tiesha Emmanuel MD  Last diagnosis associated with med order: 1. Hypertension  - lisinopril (PRINIVIL,ZESTRIL) 20 MG tablet [Pharmacy Med Name: Lisinopril Oral Tablet 20 MG]; Take 1 tablet (20 mg total) by mouth daily.  Dispense: 90 tablet; Refill: 0    If protocol passes may refill for 12 months if within 3 months of last provider visit (or a total of 15 months).             Passed - Serum Potassium in past 12 months     Lab Results   Component Value Date    Potassium 5.1 (H) 10/02/2018             Passed - Blood pressure filed in past 12 months     BP Readings from Last 1 Encounters:   05/28/19 120/68             Passed - Serum Creatinine in past 12 months     Creatinine   Date Value Ref Range Status   10/02/2018 1.06 0.70 - 1.30 mg/dL Final

## 2021-05-29 NOTE — PROGRESS NOTES
OFFICE VISIT NOTE      Assessment & Plan   Juan Fleming is a 64 y.o. male here to establish care with me. He is obese but quite active, manages his diabetes very well, takes BP med and cholesterol med and 3 very large calluses and a bunion on his foot. I trimmed down the calluses so he can continue to be active. He will wait to do the bunion surgery since he wants to be in his boat this summer. He will work on losing some weight to help his overall health, but he'll also focus on enjoying life.  Catch up diabetes health maint - eye referral, urine microalb, A1-C today and lipid.  Gave pneumococcal vaccine.  He did get the shingles vaccine already.      Diagnoses and all orders for this visit:    Type 2 diabetes mellitus treated without insulin (H)  -     Glycosylated Hemoglobin A1c  -     Microalbumin, Random Urine  -     Ambulatory referral to Ophthalmology  -     Lipid [place] FASTING    Immunization due  -     Pneumococcal conjugate vaccine 13-valent 6wks-17yrs; >50yrs    Callus of foot    Corn or callus    Bunion, left    Dyslipidemia    Class 2 severe obesity due to excess calories with serious comorbidity and body mass index (BMI) of 35.0 to 35.9 in adult (H)    Type 2 diabetes mellitus with complication, without long-term current use of insulin (H)    Benign essential HTN    History of pulmonary embolism        Martine Ng MD    The following high BMI interventions were performed this visit: encouragement to exercise, exercise promotion: strength training, exercise promotion: stretching and exercise promotion: walking/step counting          Subjective:   Chief Complaint:  Establish Care    64 y.o. male.     1) had shingles shot - 1/19/19 and 3/19/19    2) has a puppy - is active walking the puppy  Walking - was doing 2 miles per day but now will be boating  [place] - this [place] him going, too.  Does not go out when it's extremely cold.    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    3) eating - 3 meals per day - skimpy on breakfast: Activia and Kind bar.  Adjusted once he had diabetes, to 60 carbs/day, lost weight  Eats fresh veggies and fruit - nibbles/grazes at work  Limits red meat, gets fish and tofu  Limits portions  Has treat-food during the day (not before bed)    4) foot problem - has seen the podiatrist who says his toe needs to be straightened, thus he gets a callus  He tore the callus off and got cellulitis last year.    Current Outpatient Medications   Medication Sig     acetaminophen (TYLENOL) 500 MG tablet Take 1,000 mg by mouth every 6 (six) hours as needed for pain.     ascorbic acid, vitamin C, (VITAMIN C) 1000 MG tablet Take 2,000 mg by mouth daily as needed. Daily when patient feels he's getting sick     aspirin 81 MG EC tablet Take 81 mg by mouth daily.     atorvastatin (LIPITOR) 10 MG tablet TAKE ONE TABLET BY MOUTH ONE TIME DAILY      lisinopril (PRINIVIL,ZESTRIL) 20 MG tablet Take 1 tablet (20 mg total) by mouth daily.     ONETOUCH DELICA LANCETS 33 gauge Misc Inject 1 Box under the skin daily before breakfast.     ONETOUCH DELICA LANCETS 33 gauge Misc TEST 4 TIMES DAILY     ONETOUCH VERIO strips TEST 4 TIMES DAILY PER LANCET RX     SHINGRIX, PF, 50 mcg/0.5 mL SusR injection        PSFHx: appropriate sections of PMH completed/filled in   Tobacco Status:  He  reports that he quit smoking about 4 years ago. He smoked 0.50 packs per day. He has never used smokeless tobacco.    Review of Systems:  No fever.  No constipation. All other systems negative except as noted above.    Objective:    /68   Pulse 81   Temp 97.8  F (36.6  C) (Oral)   Resp 12   Ht 5' 9.29\" (1.76 m)   Wt (!) 241 lb 12 oz (109.7 kg)   SpO2 95%   BMI 35.40 kg/m    GENERAL: No acute distress.  Ht: reg s1s2  Lungs: clear - no wheezes or rales    Skin: feet: skin is intact, no sores but 3 very large calluses on the left foot: on the medial side of the bunion, in the " ball of the foot and on the 2nd toe adjacent to the great toe    Spent 40 min face to face with patient with more the 50% spent in counseling, education and coordination of care and discussing obesity, diabetes, hyperlipidemia, bunion and calluses, aspirin therapy.

## 2021-05-30 VITALS — BODY MASS INDEX: 43.43 KG/M2 | HEIGHT: 71 IN | WEIGHT: 310.2 LBS

## 2021-05-30 VITALS — WEIGHT: 315 LBS | HEIGHT: 71 IN | BODY MASS INDEX: 44.1 KG/M2

## 2021-05-30 VITALS — BODY MASS INDEX: 45.19 KG/M2 | WEIGHT: 315 LBS

## 2021-05-30 VITALS — HEIGHT: 70 IN | WEIGHT: 315 LBS | BODY MASS INDEX: 45.1 KG/M2

## 2021-05-30 VITALS — BODY MASS INDEX: 44.1 KG/M2 | HEIGHT: 71 IN | WEIGHT: 315 LBS

## 2021-05-30 VITALS — BODY MASS INDEX: 44.21 KG/M2 | WEIGHT: 315 LBS

## 2021-05-30 NOTE — TELEPHONE ENCOUNTER
Medication Question or Clarification  Who is calling: Patient  What medication are you calling about? (include dose and sig)   atorvastatin (LIPITOR) 10 MG tablet 90 tablet 3 6/19/2018     Sig: TAKE ONE TABLET BY MOUTH ONE TIME DAILY     Sent to pharmacy as: atorvastatin (LIPITOR) 10 MG tablet       Disp Refills Start End    lisinopril (PRINIVIL,ZESTRIL) 20 MG tablet 90 tablet 0 6/17/2019     Sig - Route: Take 1 tablet (20 mg total) by mouth daily. - Oral          Who prescribed the medication?: Dr Izaguirre for the cholesterol medication and Dr Ng for the blood pressure medication.    What is your question/concern?: Patient states that he had his cholesterol done on 5/28/2019 and states someone told him they can not fill the atorvastatin due to he needs a lab done.  Also patient states that the pharmacy told him that he needs a new prescription for lisinopril as the pharmacy did not get the 6/17/2019 one.  Please advise.    He needs both filled.      Pharmacy: [facility]  Balbir to leave a detailed message?: Yes  Site CMT - Please call the pharmacy to obtain any additional needed information.

## 2021-05-31 ENCOUNTER — RECORDS - HEALTHEAST (OUTPATIENT)
Dept: ADMINISTRATIVE | Facility: CLINIC | Age: 66
End: 2021-05-31

## 2021-05-31 VITALS — WEIGHT: 214 LBS | HEIGHT: 70 IN | BODY MASS INDEX: 30.64 KG/M2

## 2021-05-31 VITALS — BODY MASS INDEX: 32.95 KG/M2 | WEIGHT: 236.25 LBS

## 2021-05-31 VITALS — BODY MASS INDEX: 31.78 KG/M2 | WEIGHT: 227 LBS | HEIGHT: 71 IN

## 2021-05-31 VITALS — HEIGHT: 70 IN | WEIGHT: 217 LBS | BODY MASS INDEX: 31.07 KG/M2

## 2021-05-31 VITALS — BODY MASS INDEX: 31.52 KG/M2 | WEIGHT: 226 LBS

## 2021-05-31 VITALS — WEIGHT: 214 LBS | BODY MASS INDEX: 30.64 KG/M2 | HEIGHT: 70 IN

## 2021-05-31 VITALS — BODY MASS INDEX: 38.78 KG/M2 | WEIGHT: 277 LBS | HEIGHT: 71 IN

## 2021-05-31 VITALS — BODY MASS INDEX: 31.07 KG/M2 | HEIGHT: 70 IN | WEIGHT: 217 LBS

## 2021-05-31 VITALS — BODY MASS INDEX: 41 KG/M2 | WEIGHT: 294 LBS

## 2021-05-31 NOTE — TELEPHONE ENCOUNTER
Per 5/28/19 appointment notes, patient to follow up with PCP in 6 months.  No appointment noted.    Ophthalmology referral placed 5/28/19.  Last eye exam in chart is 3/9/17 from [facility].    Left voice message at home number asking patient to call clinic at [phone].  Included date of call in message.    When call returned, help schedule PCP appointment and request eye exam report (done or scheduled?) be faxed to [phone].    Thank you.

## 2021-05-31 NOTE — TELEPHONE ENCOUNTER
Patient Returning Call  Reason for call:  Patient returned missed call  Information relayed to patient:  Relayed message: When call returned, help schedule PCP appointment and request eye exam report (done or scheduled?) be faxed to [phone].    Patient stated he will make an appointment for the eye exam and will callback to schedule an appointment with PCP in the middle to end of September. He appreciates the reminder calls.  Patient has additional questions:  No  If YES, what are your questions/concerns:  n/a  Okay to leave a detailed message?: No call back needed

## 2021-06-01 ENCOUNTER — RECORDS - HEALTHEAST (OUTPATIENT)
Dept: ADMINISTRATIVE | Facility: OTHER | Age: 66
End: 2021-06-01

## 2021-06-01 ENCOUNTER — RECORDS - HEALTHEAST (OUTPATIENT)
Dept: ADMINISTRATIVE | Facility: CLINIC | Age: 66
End: 2021-06-01

## 2021-06-01 VITALS — WEIGHT: 214 LBS | HEIGHT: 70 IN | BODY MASS INDEX: 30.64 KG/M2

## 2021-06-01 VITALS — BODY MASS INDEX: 30.24 KG/M2 | HEIGHT: 71 IN | WEIGHT: 216 LBS

## 2021-06-01 VITALS — BODY MASS INDEX: 30.35 KG/M2 | HEIGHT: 71 IN | WEIGHT: 216.75 LBS

## 2021-06-01 VITALS — HEIGHT: 71 IN | WEIGHT: 215 LBS | BODY MASS INDEX: 30.1 KG/M2

## 2021-06-01 VITALS — WEIGHT: 218 LBS | BODY MASS INDEX: 30.52 KG/M2 | HEIGHT: 71 IN

## 2021-06-01 LAB — RETINOPATHY: NEGATIVE

## 2021-06-01 NOTE — TELEPHONE ENCOUNTER
See message string.      Called to inquire if [facility] has contacted to schedule eye exam and to assist in scheduling f/u appt with PCP.  Referral placed 5/28/19.  Left voice message including clinic phone number and date of call.  Please assist with scheduling if patient returns call.

## 2021-06-01 NOTE — TELEPHONE ENCOUNTER
Patient has scheduled PCP appointment to discuss 9/26/19 [facility] exam.  Successful fax of request (letter) for copy of exam report.

## 2021-06-01 NOTE — TELEPHONE ENCOUNTER
See message string.    Calling to inquire if eye exam has been done or scheduled.  Will encourage patient to schedule mid to late September appt with PCP.    Left voice message asking patient to call clinic re: PCP and eye exam appointments.   Included clinic phone number and date of call in message.

## 2021-06-02 VITALS — BODY MASS INDEX: 30.94 KG/M2 | HEIGHT: 71 IN | WEIGHT: 221 LBS

## 2021-06-03 VITALS
WEIGHT: 256.5 LBS | RESPIRATION RATE: 20 BRPM | TEMPERATURE: 98.1 F | SYSTOLIC BLOOD PRESSURE: 142 MMHG | HEART RATE: 56 BPM | DIASTOLIC BLOOD PRESSURE: 70 MMHG | BODY MASS INDEX: 37.99 KG/M2 | HEIGHT: 69 IN

## 2021-06-03 VITALS — HEIGHT: 69 IN | WEIGHT: 241.75 LBS | BODY MASS INDEX: 35.81 KG/M2

## 2021-06-03 NOTE — TELEPHONE ENCOUNTER
FYI - Status Update  Who is Calling: Patient  Update: Patient called to follow up on this medication.   Okay to leave a detailed message?:  No return call needed

## 2021-06-03 NOTE — TELEPHONE ENCOUNTER
Provider-please send Metformin as recommended by you to the pharmacy below. Thanks.     Requested to send prescription to [facility] # [id number]. Prescription prepped, please review, sign, and send. Thanks.

## 2021-06-03 NOTE — TELEPHONE ENCOUNTER
Please call Juan and let him know his A1-C is 6.2.    I suggest he take metformin 500mg daily while he takes the increased dose of blood pressure medication. The reason is that the metformin might help him lose weight and accomplish his goals sooner. However, if he prefers not to take the metformin, that's up to him. Let me know his preference -I'll only order the metformin if he plans to take it.  (assuming he loses weight in a few months, then he can stop the metformin, just like he can then stop the higher dose of BP med).

## 2021-06-03 NOTE — PROGRESS NOTES
OFFICE VISIT NOTE      Assessment & Plan   Juan Fleming is a 64 y.o. male who has put on weight and not been as active in the past months. He wants to get back to better routines. In the next 3 months, he'll increase his activity and watch his eating, while taking lisinopril WITH hydrochlorothiazide to help his BP be better controlled. He'll also take metformin 500mg daily to help lose weight and control his rising A1-C.  We discussed health choices and risks. I was not 100% convinced he'd stick with healthier choices.    Diagnoses and all orders for this visit:    Benign essential HTN  -     lisinopril-hydrochlorothiazide (ZESTORETIC) 20-12.5 mg per tablet; Take 1 tablet by mouth daily.  Dispense: 30 tablet; Refill: 2  -     Glycosylated Hemoglobin A1c  -     HIV Antigen/Antibody Diagnostic Cascade  -     Hepatitis C Antibody (Anti-HCV)  -     HM1(CBC and Differential)  -     Basic Metabolic Panel  -     HM1 (CBC with Diff)    Class 2 severe obesity due to excess calories with serious comorbidity and body mass index (BMI) of 35.0 to 35.9 in adult (H)  -     lisinopril-hydrochlorothiazide (ZESTORETIC) 20-12.5 mg per tablet; Take 1 tablet by mouth daily.  Dispense: 30 tablet; Refill: 2  -     Glycosylated Hemoglobin A1c  -     HIV Antigen/Antibody Diagnostic Cascade  -     Hepatitis C Antibody (Anti-HCV)  -     HM1(CBC and Differential)  -     HM1 (CBC with Diff)    Type 2 diabetes mellitus treated without insulin (H)  -     Glycosylated Hemoglobin A1c  -     Basic Metabolic Panel        Martine Ng MD    The following high BMI interventions were performed this visit: encouragement to exercise, exercise promotion: strength training, exercise promotion: stretching and exercise promotion: walking/step counting          Subjective:   Chief Complaint:  Follow-up    64 y.o. male.     1) had a busy summer boating and gained weight, but has already started walking and getting better  Got into bad habits - eating a  late, big supper with dessert, less active and did not eat in the mornings.     2) can feel the increased weight whenever he ties his shoes - which is difficult, or when he puts his winter coat on - it used to be roomy but now barely fits.  He is glad he has not gained more weight, but also wants to lose what he's put on.    Current Outpatient Medications   Medication Sig     atorvastatin (LIPITOR) 10 MG tablet Take 10 mg by mouth.     blood glucose test strips TEST 4 TIMES DAILY PER LANCET RX     lancets 33 gauge Misc Inject under the skin.     acetaminophen (TYLENOL) 500 MG tablet Take 1,000 mg by mouth every 6 (six) hours as needed for pain.     acetaminophen (TYLENOL) 500 MG tablet Take 1,000 mg by mouth.     ascorbic acid, vitamin C, (VITAMIN C) 1000 MG tablet Take 2,000 mg by mouth daily as needed. Daily when patient feels he's getting sick     ascorbic acid, vitamin C, (VITAMIN C) 1000 MG tablet Take 2,000 mg by mouth.     aspirin 81 MG EC tablet Take 81 mg by mouth daily.     atorvastatin (LIPITOR) 10 MG tablet Take 1 tablet (10 mg total) by mouth daily.     lisinopril-hydrochlorothiazide (ZESTORETIC) 20-12.5 mg per tablet Take 1 tablet by mouth daily.     metFORMIN (GLUCOPHAGE XR) 500 MG 24 hr tablet Take 1 tablet (500 mg total) by mouth daily with breakfast.     neomycin-polymyxin-hydrocortisone (CORTISPORIN) 3.5-10,000-1 mg/mL-unit/mL-% otic suspension Place 4 Drops into left ear 4 times daily for 7 days.     ONETOUCH DELICA LANCETS 33 gauge Misc Inject 1 Box under the skin daily before breakfast.     ONETOUCH DELICA LANCETS 33 gauge Misc TEST 4 TIMES DAILY     ONETOUCH VERIO strips TEST 4 TIMES DAILY PER LANCET RX     SHINGRIX, PF, 50 mcg/0.5 mL SusR injection        PSFHx: appropriate sections of PMH completed/filled in   Tobacco Status:  He  reports that he quit smoking about 4 years ago. He smoked 0.50 packs per day. He has never used smokeless tobacco.    Review of Systems:  No fever.  No  "constipation. All other systems negative except as noted above.    Objective:    /70   Pulse (!) 56   Temp 98.1  F (36.7  C) (Oral)   Resp 20   Ht 5' 9\" (1.753 m)   Wt (!) 256 lb 8 oz (116.3 kg)   BMI 37.88 kg/m    GENERAL: No acute distress.  HT: reg s1s2  Lungs: clear with fair aeration, no wheezes or rales    Labs pending    Spent 25 min face to face with patient with more the 50% spent in counseling, education and coordination of care and discussing weight, diabetes, blood pressure, activity, nutrition, and pets.        "

## 2021-06-04 VITALS
WEIGHT: 217 LBS | DIASTOLIC BLOOD PRESSURE: 62 MMHG | SYSTOLIC BLOOD PRESSURE: 124 MMHG | BODY MASS INDEX: 32.05 KG/M2 | HEART RATE: 60 BPM

## 2021-06-04 NOTE — TELEPHONE ENCOUNTER
RN cannot approve Refill Request    RN can NOT refill this medication historical medication requested. Last office visit: 11/5/2019 Martine Ng MD Last Physical: Visit date not found Last MTM visit: Visit date not found Last visit same specialty: 11/5/2019 Martine Ng MD.  Next visit within 3 mo: Visit date not found  Next physical within 3 mo: Visit date not found      Cynthia SEGOVIA Leatha, [facility] Triage/Med Refill 12/20/2019    Requested Prescriptions   Pending Prescriptions Disp Refills     blood glucose test (ONETOUCH VERIO) strips 400 strip 3     Sig: TEST 4 TIMES DAILY PER LANCET RX       Diabetic Supplies Refill Protocol Passed - 12/20/2019  7:29 AM        Passed - Visit with PCP or prescribing provider visit in last 6 months     Last office visit with prescriber/PCP: 11/5/2019 Martine Ng MD OR same dept: 11/5/2019 Martine Ng MD OR same specialty: 11/5/2019 Martine Ng MD  Last physical: Visit date not found Last MTM visit: Visit date not found   Next visit within 3 mo: Visit date not found  Next physical within 3 mo: Visit date not found  Prescriber OR PCP: Martine Ng MD  Last diagnosis associated with med order: 1. DM2 (diabetes mellitus, type 2) (H)  - blood glucose test (ONETOUCH VERIO) strips; Dispense brand per patient's insurance at pharmacy discretion.  Dispense: 400 strip; Refill: 3    If protocol passes may refill for 12 months if within 3 months of last provider visit (or a total of 15 months).             Passed - A1C in last 6 months     Hemoglobin A1c   Date Value Ref Range Status   11/05/2019 6.2 (H) 3.5 - 6.0 % Final

## 2021-06-04 NOTE — TELEPHONE ENCOUNTER
FYI - Status Update  Who is Calling: Patient  Update: Patient is out of test strips and requested this ASAP  Okay to leave a detailed message?:  Yes

## 2021-06-05 VITALS
BODY MASS INDEX: 33.92 KG/M2 | HEART RATE: 48 BPM | DIASTOLIC BLOOD PRESSURE: 68 MMHG | WEIGHT: 229 LBS | TEMPERATURE: 97.9 F | OXYGEN SATURATION: 100 % | SYSTOLIC BLOOD PRESSURE: 132 MMHG | HEIGHT: 69 IN

## 2021-06-05 VITALS — SYSTOLIC BLOOD PRESSURE: 136 MMHG | BODY MASS INDEX: 33.97 KG/M2 | DIASTOLIC BLOOD PRESSURE: 80 MMHG | WEIGHT: 230 LBS

## 2021-06-05 NOTE — TELEPHONE ENCOUNTER
Medication Request  Medication name: One Touch Verio Test Strip  Requested Pharmacy: [facility] # [id number]  Reason for request: Historically listed in medication list.  Needs ASAP please  When did you use medication last?:  unknown  Patient offered appointment:  N/A - electronic request  Okay to leave a detailed message: yes

## 2021-06-07 ENCOUNTER — RECORDS - HEALTHEAST (OUTPATIENT)
Dept: HEALTH INFORMATION MANAGEMENT | Facility: CLINIC | Age: 66
End: 2021-06-07

## 2021-06-09 NOTE — PROGRESS NOTES
DIABETES EDUCATION CARE PLAN    Assessment/Plan:     Initial visit for diabetes education and counseling. Juan was diagnosed with diabetes while in the hospital. He was discharged on Lantus and Novolog. He made drastic changes in his eating habits and is losing weight. He is using a phone brett that tracks calories and carbohydrate. Patient is also getting frequent hypoglycemia during the day. After talking to Dr. Izaguirre they discontinued the Novolog and decreased the Lantus. The current dose of Lantus (10 units) is still causing drops in blood sugar. Juan has to eat snacks during the day to avoid low blood sugars. Decreased Lantus by 4 units. I anticipate he will be off insulin this week. Recommended he test his blood sugar to help determine if he needs a diabetes oral medication.     Instructed on carbohydrate counting, label reading and portion control. The recent eating habit changes are consistent with my recommendations today. His wife Sanjana mentioned that when he stopped drinking alcohol a couple of years ago he replaced it with sugar. Juan feels he is addicted to sugar. He has been avoiding all sweets. Suggest he avoid sweets to help lose weight and control his blood sugar.    Diabetes medications originally prescribed:  Lantus Solostar 0-0-0-30  Novolog FlexPen 14-14-14-0    Current insulin doses:  Lantus Solostar 0-0-0-10  No Novolog since 3/17/2017    Plan:  1. Test blood sugar 3 times per day (before breakfast, before dinner and 2 hours after diner).   2. Decrease Basaglar Kwikpen to 6 units at bedtime.  3. If the morning blood sugar is less than 100 for 3 days stop the Basaglar.   4. Aim for 4-5 carbohydrate choices per meal (60-75 gm carb) and 15-30 gm carbohydrate per snack.  5. Bring meter and blood sugar log to Endo visit in April and myself in early May.    Subjective and Objective:     Juan Fleming is a 61 y.o. male referred by Juan Izaguirre MD.  Accompanied by:  Spouse, Sanjana who is a  pharmacist    Wt Readings from Last 3 Encounters:   17 (!) 324 lb (147 kg)   17 (!) 324 lb (147 kg)   17 (!) 331 lb (150.1 kg)     Lab Results   Component Value Date    HGBA1C >14.0 (H) 2017     Physical Activity: Active job part-time (3-4 days per week for 5 hours/day) and swims for 30 minutes twice weekly    Diet/Eating Habits: Eats 3 meals per day and 3 snacks. Meals are around 70-80 gm carbohydrate each  Breakfast: high fiber cereal, milk and fruit, plain coffee  Lunch: sandwich and soup, fruit and plain coffee  Dinner: Meat, potato and vegetable  Snacks: fruit or yogurt or granola bar or 1 package of cheese or peanut butter crackers    SMBG pattern/BG ranges: Uses One Touch Verio Flex meter  Tests 4 times per day  B, 115, 108,129, 136, 102, 129, 124, 105, 125, 113, 67, 137, 118, 114  Food and blood sugar record scanned    Hypoglycemia: once or more per week    Blood sugar goals: -130; Bedtime: 120-140    EDUCATION RECORD    Monitoring   Meter: Assessed and Discussed and Competent  Monitoring: Discussed and Literature provided  BG goals: Discussed and Literature provided    Nutrition Management  Nutrition Management: Discussed  Weight: Assessed and Discussed  Portions/Balance: Assessed and Discussed  Carb ID/Count: Discussed and Literature provided  Label Reading: Discussed and Literature provided  Heart Healthy Fats: Discussed and Literature provided  Physical Activity: Assessed and Discussed  Injected Medications: Discussed   Storage/Exp:Not addressed   Site Rotation: Not addressed     Diabetes Disease Process: Discussed and Literature provided    Acute Complications: Prevent, Detect, Treat:  Hypoglycemia: Assessed and Discussed and Literature provided  Hyperglycemia: Assessed and Discussed and Literature provided  Sick Days: Not addressed  Driving: Not addressed    Chronic Complications  Foot Care:Not addressed  Skin Care: Not addressed  Eye: Not addressed  ABC: Discussed  and Literature provided  Teeth:Not addressed  Goal Setting and Problem Solving: Discussed and Literature provided  Barriers: Assessed  Psychosocial Adjustments: Assessed    Time spent with the patient: 90 minutes   Previous Education: no  Visit Type:Medical Nutrition Therapy, Initial (30801)    Diagnosis per referral:Type 2 Diabetes, uncontrolled E11.65    Cielo Strickland RD, LD, CDE  3/21/2017  8:30 AM

## 2021-06-09 NOTE — TELEPHONE ENCOUNTER
Refill Approved    Rx renewed per Medication Renewal Policy. Medication was last renewed on 7/9/2019.    Kristine Howe, [facility] Connection Triage/Med Refill 6/18/2020     Requested Prescriptions   Pending Prescriptions Disp Refills     atorvastatin (LIPITOR) 10 MG tablet [Pharmacy Med Name: Atorvastatin Calcium Oral Tablet 10 MG] 90 tablet 0     Sig: Take 1 tablet by mouth daily       Statins Refill Protocol (Hmg CoA Reductase Inhibitors) Passed - 6/18/2020  7:03 AM        Passed - PCP or prescribing provider visit in past 12 months      Last office visit with prescriber/PCP: Visit date not found OR same dept: 11/5/2019 Martine Ng MD OR same specialty: 11/5/2019 Martine Ng MD  Last physical: Visit date not found Last MTM visit: Visit date not found   Next visit within 3 mo: Visit date not found  Next physical within 3 mo: Visit date not found  Prescriber OR PCP: Sohan Isabel MD  Last diagnosis associated with med order: 1. Type 2 diabetes mellitus without complication, without long-term current use of insulin (H)  - atorvastatin (LIPITOR) 10 MG tablet [Pharmacy Med Name: Atorvastatin Calcium Oral Tablet 10 MG]; Take 1 tablet by mouth daily  Dispense: 90 tablet; Refill: 0    If protocol passes may refill for 12 months if within 3 months of last provider visit (or a total of 15 months).

## 2021-06-09 NOTE — PROGRESS NOTES
Subjective:   Juan comes in for follow-up of his hospitalization.  He was admitted to the hospital with significantly elevated blood sugars.  He now is on Lantus insulin and NovoLog insulin.  He is checking his sugars.  He feels well.  His sugars are now very well controlled.  They range from 70 up to 120.  He is starting to be more active.  He wants to lose weight.  He has an appointment with the diabetic educator next week.  He routinely is checking his sugars.  Presently he is taking 14 units of NovoLog insulin with meals and 30 units of Lantus insulin at bedtime.  He denies any shortness of breath.  He denies chest pains.  His appetite is good.  He has no nausea or vomiting.  He now has no polyuria or polydipsia which he had prior to his hospitalization.      Objective:  HEENT: Pupils equally round and reactive to light.  EOMs are full.  Fundi are benign.  Pharynx today is clear.  Oral mucosa is moist.  Neck: Neck reveals no lymphadenopathy.  No increased JVD present.  Lungs: Lungs are clear.  Patient's in no respiratory distress.  Cardiac: There is a regular rhythm present.  No murmur heard.  Extremities: There is decreased sensation over the tips of the toes.  Skin integrity normal however.  2+ edema noted in the feet and pretibial areas.  Abdomen: Abdomen is obese but soft.  It is nontender.  Skin: There is erythema noted in the groin bilaterally.  Mild skin desquamation is present.  No exudative process is noted.      Assessment:  1.  Diabetes mellitus.  Presently well controlled on insulin  2.  Candida dermatitis groin  3.  Morbid obesity      Plan:  I had a long discussion with the patient about diabetes.  He will try to lose weight.  I instructed him in the importance of weight loss and blood sugar control.  He will continue present doses of insulin.  He will make sure he takes a small snack between meals since he is on insulin now.  He is seeing a diabetic educator to help instruct him on diet plans.  I  feel he is taking too many carbohydrates at this point in time.  He will follow-up here in 2-3 months to check his A1c.  He will come in sooner for any new questions on diabetes.

## 2021-06-09 NOTE — PROGRESS NOTES
Subjective:   Juan comes in for discussion of his diabetic lab numbers.  He has been checking his sugars at home.  Some of the sugars have been under 50.  He gets shaky at those times.  He needs to take extra carbs during the day.  He has actually been cutting his insulin dose at home himself.  He now is down to 7 units of NovoLog with meals and 30 units of Lantus at bedtime.  He has been losing some weight.  He is trying to exercise more as well.  His polyuria and polydipsia now have dissipated totally.      Objective:  HEENT: Fundi appear benign.  No exudates or hemorrhages noted.  Pharynx is clear.  Cardiac: There is a regular rhythm present.  No ectopy heard.  No murmur present today.  Pulses are strong in upper and lower extremities.  Lungs: Lungs are totally clear.  Patient's in no respiratory distress.  Extremities: Legs have 1+ edema bilaterally.  Skin integrity in the feet is normal.  Pulses are palpable in the dorsal pedal areas.  Sensory exam of the toes is normal today to light touch.      Assessment:  1.  Diabetes mellitus which is well controlled on present insulin doses.  Patient is having some hypoglycemic episodes      Plan:  We will decrease patient's NovoLog insulin 5 units with meals.  He will also cut his Lantus insulin to 20 units at bedtime.  He'll continue checking his sugars.  He will call me next week with those readings.  I will see him back here in one month.  He also will set some weight loss guidelines.  I instructed him and the importance of losing weight to help control sugars as well.  We discussed setting a goal of trying to get on oral medication to control patient's sugars and getting off of the insulin.  He would like to see that happen.

## 2021-06-10 NOTE — PROGRESS NOTES
Subjective:   Juan comes in today for check of his diabetes and blood pressure.  He is now controlling this with diet and exercise only.  He is on no medications for diabetic control.  He denies polyuria or polydipsia.  He has lost 50 pounds since being diagnosed with diabetes.  He feels well.  He exercises routinely.  He denies lightheadedness or dizziness.  He has had no hypoglycemic episodes.      Objective:  HEENT: Pupils equally round and reactive to light.  Fundi are benign.  Lungs: Lungs are clear.  Patient's in no respiratory distress.  Cardiac: There is a regular rhythm present.  No murmur heard.  Extremities: Sensory exam of fingers and toes was totally normal.  Skin integrity normal in the feet.  No edema noted.      Assessment:  1.  Diabetes mellitus which appears in excellent control with diet and exercise  2.  Hypertension in good control  3.  Recent hypertriglyceridemia most likely secondary to hyperglycemia.      Plan:  The patient will have routine lab work done today.  Hemoglobin A1c will be drawn.  He will be called with abnormalities.  He will continue his present treatment for his diabetes.  I congratulated him on his weight control.  Follow-up here in 3 months.

## 2021-06-10 NOTE — PROGRESS NOTES
DIABETES EDUCATION CARE PLAN    Assessment/Plan:     Follow-up visit for diabetes education and counseling. Juan has been off of insulin for close to 2 months. He is working hard on limiting carbohydrates and exercising more. Noted he lost 30 pounds in the past 2 months. All of his blood sugars are within target. He mentioned he is an all or nothing type of person. His long term weight goal is 220 pounds. Patient mentioned he is hungry all of the time. Discussed he can add in snacks if his meals are more than 4-5 hours apart. Provided a healthy snack list. Encouraged him to keep testing his blood sugar to monitor if his diabetes is changing.     Current diabetes medications: none    Plan:  1. Continue to test blood sugar 3 times per day.  2. Continue to aim for 60 gm carbohydrate per meal.   3. Add in small snacks (15-30 gram carbohydrate each) to help control hunger and even out blood sugar.  4. Continue to swim on days off of work.  5. Bring meter and blood sugar log to to next diabetes education visit in June.    Subjective/Objective:      Juan Fleming is a 62 y.o. male referred by Juan Izaguirre MD.  Accompanied by unaccompanied    Wt Readings from Last 3 Encounters:   05/02/17 (!) 294 lb (133.4 kg)   04/07/17 (!) 310 lb 3.2 oz (140.7 kg)   03/21/17 (!) 317 lb (143.8 kg)     Lab Results   Component Value Date    HGBA1C >14.0 (H) 02/28/2017     Diet/Eating Habits: 3 meals per day and no snacks    Physical Activity: swimming for 30 minutes. Active job 5 hours on work days    SMBG pattern/BG ranges: Uses One Touch Verio Flex meter  Tests 3 times per day  Fasting 94, 84, 89, 111, 94, 84, 95  Supper 102, 88, 76, 72, 78, 81  Postprandial 128, 117, 138, 110, 146, 117    Hypoglycemia: none    Blood sugar goals: -130; Bedtime: 120-140    EDUCATION RECORD     Monitoring   Meter (per above goals): Competent  Monitoring: Competent  BG goals: Assessed, Discussed and Competent    Nutrition Management  Nutrition  Management: Discussed  Weight: Discussed  Portions/Balance: Assessed, Discussed, Patient returned demonstration and Competent  Carb ID/Count: Assessed, Discussed, Patient returned demonstration and Competent  Label Reading: Competent  Heart Healthy Fats: Discussed and Competent  Physical Activity: Assessed, Discussed, Patient returned demonstration and Competent    Diabetes Disease Process: Discussed    ABC: Discussed and Competent  Goal Setting and Problem Solving: Assessed, Discussed and Competent  Barriers: Assessed  Psychosocial Adjustments: Assessed    Time spent with the patient: 60 minutes  Visit Type: Medical Nutrition Therapy,  Re-assessment ([id number])  Diagnosis per referral:Type 2 Diabetes, uncontrolled E11.65    Cielo Strickland, XIMENA, LD, CDE  5/2/2017  1:57 PM

## 2021-06-10 NOTE — PROGRESS NOTES
[facility]  ENDOCRINOLOGY    Diabetes Note 4/14/2017    Juan Fleming, 1955, [id number]          Reason for visit      1. New onset type 2 diabetes mellitus        HPI     Juan Fleming is a very pleasant 61 y.o. old male who presents for follow up.  SUMMARY:  Mr Fleming is here today at the behest of his PCP, Dr. Izaguirre, for new onset DM 2.  He was recently hospitalized for DKA and was found with an A1c of >14.  He reports a BG of 748. He had polyuria and polydipsia 2-3 weeks preceding his hospitalization. He was told that his blood sugars were likely elevated for greater than 90 days before.  He was started on insulin in the hospital and has been diligent with his testing and his diet.  Over the last two weeks, he has been having some significant hypoglycemia and has been steady in decline of insulin administration.  He is now using none at all because his blood sugars have all been within range. Looking at his logbook, his blood sugars range between 71 and 127.  He has lost some weight because of the dietary changes.  He is quite active at his job as a  at a [employer].  While there, he states that he is constantly moving for his 5 hour shift.  If he is not at work, he tries to swim.   He states that he had an intestinal rupture 2 years ago and at that time, he quit drinking.  He states that he made up for that with Hagendaz and cookies and his weight ballooned. Current BMI is 43.2 He also has had 2 torn retinas both of which were repaired by laser.        Blood glucose data:      Past Medical History     Patient Active Problem List   Diagnosis     Benign Prostatic Hypertrophy     History of pulmonary embolism     Muscle Spasm     Acrochordon     Essential hypertension     Anxiety     Alcohol abuse     Edema     Large bowel perforation     ARF (acute renal failure)     Anemia associated with acute blood loss     New onset type 2 diabetes mellitus     Hyperosmolar (nonketotic) coma      "Hyperosmolarity due to secondary diabetes mellitus     Morbid obesity due to excess calories     SINDI (acute kidney injury)     Hyponatremia        Family History       family history is not on file.    Social History      reports that he quit smoking about 2 years ago. He smoked 0.50 packs per day. He has never used smokeless tobacco. He reports that he does not drink alcohol or use illicit drugs.      Review of Systems     Patient has no polyuria or polydipsia, no chest pain, dyspnea or TIA's, no numbness, tingling or pain in extremities  Remainder negative except as noted in HPI.    Vital Signs     /80 (Patient Site: Right Arm, Patient Position: Sitting, Cuff Size: Adult Large)  Pulse 74  Ht 5' 11\" (1.803 m)  Wt (!) 310 lb 3.2 oz (140.7 kg)  BMI 43.26 kg/m2  Wt Readings from Last 3 Encounters:   04/07/17 (!) 310 lb 3.2 oz (140.7 kg)   03/21/17 (!) 317 lb (143.8 kg)   03/17/17 (!) 324 lb (147 kg)       Physical Exam     Constitutional:  Well developed, Well nourished  HENT:  Normocephalic,   Neck: Thyroid normal, No lymph nodes, Supple  Eyes:  PERRL, Conjunctiva pink  Respiratory:  Normal breath sounds, No respiratory distress  Cardiovascular:  Normal heart rate, Normal rhythm, No murmurs  GI:  Bowel sounds normal, Soft, No tenderness  Musculoskeletal:  No gross deformity or lesions, normal dorsalis pedis pulses  Skin: No acanthosis nigricans, lipoatrophy or lipodystrophy  Neurologic:  Alert & oriented x 3, nonfocal  Psychiatric:  Affect, Mood, Insight appropriate  Diabetic foot exam: no ulcers, charcot's or high risk calluses, Normal monofilament exam          Assessment     1. New onset type 2 diabetes mellitus        Plan     Certainly I have asked Mr Fleming to continue his dietary practice and activity.  I have also encouraged him to continue testing his blood sugars.  I am unsure if he is in a \"honeymoon\" period as we sometimes see with Type 1 DM.  I would like to see him back as needed, and have " "instructed him to keep in touch with Dr Izaguirre.  He does have a f/u with DME in May.  Instructed to contact me should his blood sugars become unmanageable.  Time spent with pt today: 30 min with >50% spent in counseling and coordination of care.    Ne Dotson  [facility]  4/14/2017  6:45 AM      Lab Results     Hemoglobin A1c   Date Value Ref Range Status   02/28/2017 >14.0 (H) 3.5 - 6.0 % Final     Creatinine   Date Value Ref Range Status   03/02/2017 1.22 0.70 - 1.30 mg/dL Final   03/01/2017 1.51 (H) 0.70 - 1.30 mg/dL Final   03/01/2017 1.30 0.70 - 1.30 mg/dL Final       Cholesterol   Date Value Ref Range Status   03/01/2017 151 <=199 mg/dL Final     HDL Cholesterol   Date Value Ref Range Status   03/01/2017 26 (L) >=40 mg/dL Final     LDL Calculated   Date Value Ref Range Status   03/01/2017 56 <=129 mg/dL Final     Triglycerides   Date Value Ref Range Status   03/01/2017 343 (H) <=149 mg/dL Final       Lab Results   Component Value Date    ALT 22 03/01/2017    ALT 22 03/01/2017    AST 19 03/01/2017    AST 19 03/01/2017    ALKPHOS 104 03/01/2017    ALKPHOS 104 03/01/2017    BILITOT 0.4 03/01/2017    BILITOT 0.4 03/01/2017         Current Medications     Outpatient Medications Prior to Visit   Medication Sig Dispense Refill     aspirin 81 MG EC tablet Take 81 mg by mouth daily.       blood glucose test strips Use 1 each As Directed as needed. Dispense brand per patient's insurance at pharmacy discretion. (Patient taking differently: Use 1 each As Directed 4 (four) times a day. One Touch Verio strips) 400 each prn     fosinopril (MONOPRIL) 20 MG tablet Take 1 tablet (20 mg total) by mouth daily. 90 tablet 3     generic lancets Use 1 each As Directed 4 (four) times a day. Dispense brand per patient's insurance at pharmacy discretion. 400 each prn     insulin syringe-needle U-100 (BD INSULIN SYRINGE) 1 mL 25 gauge x 5/8\" Syrg Use 1 Device As Directed 3 (three) times a day as needed. 100 each 3     LANTUS " "SOLOSTAR 100 unit/mL (3 mL) pen Inject 30 Units under the skin bedtime. Inject 6 units at bedtime. 3 mL PRN     metoprolol succinate (TOPROL-XL) 100 MG 24 hr tablet Take 100 mg by mouth daily.       NOVOLOG FLEXPEN 100 unit/mL injection pen Check blood sugar AC/HS  11.65 Type 2 with hyperglycemia  Flex Pen Needles - BD Ultra-fine Aden Pen Needles - NDC [address]-92 - dispense 1 case, refill PRN for 1 year 3 mL PRN     nystatin (MYCOSTATIN) cream Apply twice daily 30 g 2     ONETOUCH DELICA LANCETS 33 gauge Misc Inject 1 Box under the skin daily before breakfast.       pen needle, diabetic (BD ULTRA-FINE ADEN PEN NEEDLES) 32 gauge x 5/32\" Ndle Use 1 each As Directed 4 (four) times a day. 400 each prn     blood glucose meter (GLUCOMETER) Use 1 each As Directed as needed. Dispense glucometer brand per patient's insurance at pharmacy discretion. 1 each 0     ONETOUCH VERIO SYSTEM Misc Inject 1 Box under the skin three times daily at 7:30am, 11:30am and 4:30pm.       No facility-administered medications prior to visit.            "

## 2021-06-11 NOTE — PROGRESS NOTES
DIABETES CARE PLAN    Assessment/Plan:     Education Assessment: Follow-up visit for diabetes education and counseling. Reviewed A1c and blood sugar goals. Answered questions about nutrition and how to increase HDL. Last A1c decreased significantly and is close to the ADA goal of less than 7.0%. Discussed the relationship between A1c and diabetes complications. Instructed on preventing diabetes complications (tests and exams, foot care, sick days). Instructed on stress management techniques. Provided resources for how to continue to learn about diabetes: web sites, diabetes magazines). Since learning of his diabetes diagnosis patient states he lost 55-65 pounds. His weight goal is 220 pounds. Praised him for his efforts.      Plan:  1. Test blood sugar 2-3 times per day at different times.  2. Continue to limit carbohydrate to 60 grams per meal. If not hungry eating less carbohydrate is OK.  3. Continue to stay active every day.  4. Follow-up with primary care provider.    Objective/Subjective:     Juan Fleming is a 62 y.o. male referred by Juan Izaguirre MD. Accompanied by:unaccompanied. His wife Sanjana helps with healthy eating and counting carbohydrates.  Patient is eating more fruit and vegetables. Sometimes eats less than 60 gm carb/meal  Avoiding all sugary drinks and sweets    Diabetes Medications: None    Blood sugar trends: Uses a One Touch Verio Flex meter  Fasting blood sugar   Before dinner: 80-90's  2 hours after dinner:     BG goals: Before meals ; 2 hours after meals: less 180; Bedtime: 120-140    Lab Results   Component Value Date    HGBA1C 7.2 (H) 05/30/2017     Monitoring   Meter: Discussed, Literature provided, Patient returned demonstration and Competent  Monitoring: Discussed, Literature provided and Competent  BG goals: Discussed and Literature provided and Competent    Nutrition Management  Nutrition Management: Discussed and Competent  Weight: Discussed and  Competent  Portions/Balance: Discussed and Competent  Carb ID/Count: Assessed, Discussed, Literature provided and Competent  Label Reading: Assessed, Discussed Literature provided and Competent  Heart Healthy Fats: Discussed, Literature provided and competent  Menu Planning: Discussed and Literature provided  Dining Out: Discussed and Competent  Physical Activity: Assessed and Competent  Medications: Competent    Diabetes Disease Process: Competent    Stress Management: Discussed, Literature provided and Competent    Acute Complications: Prevent, Detect, Treat:  Hypoglycemia: Assessed, Discussed and Competent  Hyperglycemia: Assessed, Discussed and Competent  Sick Days: Discussed, Literature provided and Competent    Chronic Complications  Foot Care: Discussed, Literature provided and Competent  Skin Care: Discussed, Literature provided and Competent  Eye: Discussed, Literature provided and Competent.  ABC: Discussed, Literature provided and Competent  Teeth: Discussed, Literature provided and Competent  Goal Setting and Problem Solving: Assessed, Discussed and Competent  Barriers: Assessed  Psychosocial Adjustments: Assessed    Time spent with the patient: 60 minutes  Visit Type: Medical Nutrition Therapy, Re-assessment ([id number])  Diagnosis per referral: Type 2 Diabetes, uncontrolled E11.65      Cileo Strickland, RD, LD, CDE  6/20/2017  1:54 PM

## 2021-06-11 NOTE — PROGRESS NOTES
MTM Encounter    Assessment/Plan  Diabetes: Controlled to goal of 7%.  Juan's diabetes is largely diet and exercise controlled.  We discussed the pathophysiology and progression of diabetes.  Reviewed the pros and cons of a GLP-1; Juan to discuss this with his wife who is a pharmacist in the ICU.  Considering Trulicity to assist with weight loss goals and delay progression of diabetes.  Juan would like to remain insulin independent for as long as possible.  Reviewed possible side effects including nausea, gastroparesis, and rarely thyroid and pancreas issues.  Also addressed cost; Juan would be eligible for the $25 co-pay card.  Up-to-date on foot exam and eye exam.  On aspirin is appropriate.  Reviewed benefit of a statin.  His current ASCVD risk is 19%, reduced to 14% with the addition of a moderate intensity statin per [facility] Shared Decisions tool.  - Consider addition of Trulicity  - start atorvastatin 10mg daily    Hypertension: Controlled to goal of less than 140/90.  We discussed stopping metoprolol XL today as Juan has no compelling indications, and this medication is causing mild bradycardia, is likely worsening exercise tolerance and will mask the signs of hypoglycemia.  We will see how his BP looks without it and if he needs a second agent, I would recommend amlodipine.  He has a hx of mild hyponatremia so we should avoid diuretics.  Also switching from fosinipril to lisinopril to reduce cost; slight dose increase.  Juan to check home BP QD or QOD.  - reduce metoprolol XL to 50mg for one week, then 25mg for two weeks  - Stop fosinopril  - Start lisinopril 40mg QD    Follow Up  By phone in three weeks      Subjective/Objective  Juan Fleming is a 62 y.o. male here for a medication therapy management (MTM) appointment; his chief concern today is initial medication review.    Diabetes: Juan's diabetes is diet controlled.  He got influenza and was subsequently diagnosed with diabetes - glucose 780 mg/dL and A1c  of >14%.  He was hospitalized and started on insulin.  He ended up on about 75 units of insulin and has subsequently cut back and is no longer on insulin.    Last A1c: 7.2% on 5/30/17  Glucose readings: Juan checks his glucose 1-2 time(s) per day.  AM fasting range: 100-120  Last microalbumin/creatinine: none on record  Last diabetic eye exam: April '17  Last diabetic foot exam: April '17  On aspirin: Yes  On statin: no  Lifestyle interventions:encouragement to exercise, weight loss from baseline weight and lifestyle education regarding diet.  He has lost about 60 lbs since his diagnosis and he monitors his diet very closely.  60g of carbs or less per meal, working on cutting this back more.  Next weight goal is 250 lbs.    ----------------    Juan's medication list was reviewed with them, discussing reason for use, directions for use, and potential side effects of each medication as needed. Indication, safety, efficacy, and convenience was assessed for all medications addressed above.  No environmental factors were noted currently affecting patient.  This care plan was communicated via EMR with his primary care provider, Juan Izaguirre MD, who is the authorizing prescriber for this visit.  Direct supervision was available by either the patient's PCP or other available provider.  Time and complexity billing metrics are included in the docflowsheet linked to this visit.  Time spent: 65 minutes      Marko Iglesias, Matthew  [facility] Pharmacist  [facility]  [phone]

## 2021-06-12 NOTE — PROGRESS NOTES
OFFICE VISIT NOTE      Assessment & Plan   Juan Fleming is a 65 y.o. male with urinary frequency, especially at night. He does not have a UTI and his prostate is not enlarged. Will refer him to urology for additional work up.    Diagnoses and all orders for this visit:    Urine frequency  -     Urinalysis-UC if Indicated  -     oxybutynin (DITROPAN XL) 5 MG ER tablet; Take 1 tablet (5 mg total) by mouth daily.  Dispense: 30 tablet; Refill: 0  -     Ambulatory referral to Urology  -     PSA, Annual Screen (Prostatic-Specific Antigen)    Type 2 diabetes mellitus with complication, without long-term current use of insulin (H)  -     Urinalysis-UC if Indicated  -     Glycosylated Hemoglobin A1c  -     Comprehensive Metabolic Panel  -     Lipid Cascade FASTING  -     Microalbumin, Random Urine  -     HM2(CBC w/o Differential)    Benign essential HTN  -     Urinalysis-UC if Indicated  -     Comprehensive Metabolic Panel  -     HM2(CBC w/o Differential)    Dyslipidemia  -     Lipid Cascade FASTING    Nocturia  -     oxybutynin (DITROPAN XL) 5 MG ER tablet; Take 1 tablet (5 mg total) by mouth daily.  Dispense: 30 tablet; Refill: 0  -     Ambulatory referral to Urology  -     PSA, Annual Screen (Prostatic-Specific Antigen)    Microscopic hematuria  -     Ambulatory referral to Urology  -     PSA, Annual Screen (Prostatic-Specific Antigen)    Screening for prostate cancer  -     PSA, Annual Screen (Prostatic-Specific Antigen)        Martine Ng MD              Subjective:   Chief Complaint:  No chief complaint on file.    65 y.o. male.     1) peeing often at night - about every 2 hrs.  Remembers a specialist exam years ago - told he has an enlarged prostate  Urine stream during the night is small. During the day it is a good stream. He feels like he forces it out during the night.  Feels very good other than this. Watches his caffeine later in the day, he's cut back on evening water to try to get up less at night. It  does not seem to matter.  Takes a good hour nap during the day because of being tired from interrupted sleep.      Current Outpatient Medications   Medication Sig     acetaminophen (TYLENOL) 500 MG tablet Take 1,000 mg by mouth every 6 (six) hours as needed for pain.     acetaminophen (TYLENOL) 500 MG tablet Take 1,000 mg by mouth.     ascorbic acid, vitamin C, (VITAMIN C) 1000 MG tablet Take 2,000 mg by mouth daily as needed. Daily when patient feels he's getting sick     ascorbic acid, vitamin C, (VITAMIN C) 1000 MG tablet Take 2,000 mg by mouth.     aspirin 81 MG EC tablet Take 81 mg by mouth daily.     atorvastatin (LIPITOR) 10 MG tablet Take 10 mg by mouth.     atorvastatin (LIPITOR) 10 MG tablet Take 1 tablet by mouth daily     blood glucose test (ONETOUCH VERIO) strips TEST 4 TIMES DAILY PER LANCET RX     blood glucose test strips TEST 4 TIMES DAILY PER LANCET RX     lancets 33 gauge Misc Inject under the skin.     lisinopril (PRINIVIL,ZESTRIL) 10 MG tablet Take 1 tablet (10 mg total) by mouth daily.     metFORMIN (GLUCOPHAGE XR) 500 MG 24 hr tablet Take 1 tablet (500 mg total) by mouth daily with breakfast.     neomycin-polymyxin-hydrocortisone (CORTISPORIN) 3.5-10,000-1 mg/mL-unit/mL-% otic suspension Place 4 Drops into left ear 4 times daily for 7 days.     ONETOUCH DELICA LANCETS 33 gauge Misc Inject 1 Box under the skin daily before breakfast.     ONETOUCH DELICA LANCETS 33 gauge Misc TEST 4 TIMES DAILY     oxybutynin (DITROPAN XL) 5 MG ER tablet Take 1 tablet (5 mg total) by mouth daily.     SHINGRIX, PF, 50 mcg/0.5 mL SusR injection        PSFHx: appropriate sections of PMH completed/filled in   Tobacco Status:  He  reports that he quit smoking about 5 years ago. He smoked 0.50 packs per day. He has never used smokeless tobacco.    Review of Systems:  No fever.  No rash. All other systems negative except as noted above.    Objective:    /80   Wt (!) 230 lb (104.3 kg)   BMI 33.97 kg/m     GENERAL: No acute distress.  Ht: reg s1s2  Lungs: clear  Prostate exam: not enlarged, smooth, right side slightly larger than the left    Labs pending    Spent 25 min face to face with patient with more the 50% spent in counseling, education and coordination of care and discussing urination, water intake, medications, referral.

## 2021-06-12 NOTE — PROGRESS NOTES
Subjective:   Juan presents today for check of his diabetes as well as his hypertension.  He would like his blood pressure checked.  He denies any symptoms of high blood pressure.  He has no headaches.  He denies palpitations of any kind.  He checks his pressures outside of the clinic and they are usually in good control.  He states his sugars are well controlled.  Morning sugars are usually around 100.  He thinks an average sugar is about 120.  He denies polyuria or polydipsia.  He also has a mass in the abdomen he wants evaluated.  This is been present for quite a while.  It is getting larger.  It came on after he had an abdominal surgery 3 years ago.  It pains a little bit at times.  It does not cause any other symptoms.  He denies nausea.  He denies bowel changes.  He has had no bladder symptoms of any kind.      Objective:  HEENT: Fundi are benign.  No exudates or hemorrhages noted.  Pharynx clear.  Neck: Neck reveals no increased JVD.  Lungs: Lungs are totally clear.  Patient's in no respiratory distress.  Cardiac: There is a regular rhythm present.  No murmur heard.  Abdomen: There is a mass noted in the left lower quadrant of the abdomen.  This is soft.  It is reducible when the patient lies down.  There is a defect to the left of midline measuring approximately 1-1/2 to 2 cm consistent with a ventral hernia.  Bowel sounds are active throughout.  Extremities: No edema noted in the legs.  Sensory exam of the toes is normal today.      Assessment:  1.  Diabetes mellitus in excellent control on present medications  2.  Hypertension in good control  3.  Ventral hernia/incisional hernia      Plan:  Patient will be referred back to [facility] surgeons.  Dr. Rivera had seen him in the past.  He will make his own appointment with her.  Glycosylated hemoglobin is drawn today.  Follow-up here in 3 months for recheck.   Yes

## 2021-06-12 NOTE — PROGRESS NOTES
MTM Encounter    Assessment/Plan  Hypertension: Controlled to goal of less than 140/90.  Although he has noticed little change with the discontinuation of metoprolol, this will make him more sensitive to hypoglycemia and his pulse is back within normal range.  From a blood pressure perspective we could also potentially discontinue lisinopril.  However he demonstrates very mild proteinuria on today's labs.  -Continue current therapy    Diabetes: Well controlled to goal 7%.  We discussed Trulicity further today.  I told Juan I felt this medication was overkill, especially with the rapid weight loss he has had since the beginning of the year.  I told him we could discuss it again in the future if the need arises, but the significant lifestyle changes he is making are likely to adequately control his diabetes for some time.  - Continue current diet and exercise routine    Follow Up  Three months      Subjective/Objective  Juan Fleming is a 62 y.o. male here for a medication therapy management (MTM) appointment; his chief concern today is hypertension and diabetes.    Diabetes:  Juan's diabetes is currently diet controlled.  Juan has lost around 100 pounds in the past 6 months.  He was previously controlled on insulin, up to 75 units per day.  We had previously discussed the possibility of starting Trulicity.  Last A1c: 7.2%  Glucose readings: Juan checks his glucose three time(s) per day.  AM fasting range:   Last microalbumin/creatinine: 22.5 Aug '17  Last diabetic eye exam: April '17  Last diabetic foot exam: April '17  On aspirin: Yes  On statin: moderate intensity  Lifestyle interventions:weight loss from baseline weight      Hypertension: Juan stopped taking his metoprolol about three weeks ago.  He does not feel any different since stopping them.  ----------------    Juan's medication list was reviewed with them, discussing reason for use, directions for use, and potential side effects of each medication as  needed. Indication, safety, efficacy, and convenience was assessed for all medications addressed above.  No environmental factors were noted currently affecting patient.  This care plan was communicated via EMR with his primary care provider, Juan Izaguirre MD, who is the authorizing prescriber for this visit.  Direct supervision was available by either the patient's PCP or other available provider.  Time and complexity billing metrics are included in the docflowsheet linked to this visit.  Time spent: 20 minutes      Marko Iglesias PharmD  [facility] Pharmacist  [facility]  [phone]

## 2021-06-12 NOTE — PROGRESS NOTES
This is a 62 y.o. gentleman who I'm asked to see by Juan Izaguirre MD  for evaluation of a ventral hernia.  The patient has noticed a bulge for about 2 years now.  There is no pain.  He noticed that fairly quickly after sigmoid colectomy surgery.  It does not hurt but the bulge is becoming more noticeable.  He is also lost a significant amount of weight and that has made the bulge actually more obvious.    See chart review for PMH, Med list, allergies, FH and SH.  Past Medical History:   Diagnosis Date     Alcohol abuse      Diabetes mellitus      Hernia 09/07/2017    Ventral     Hypertension      Influenza      PE (pulmonary embolism)      Perforated bowel        Current Outpatient Prescriptions:      aspirin 81 MG EC tablet, Take 81 mg by mouth daily., Disp: , Rfl:      atorvastatin (LIPITOR) 10 MG tablet, Take 1 tablet (10 mg total) by mouth daily., Disp: 90 tablet, Rfl: 3     blood glucose test strips, Use 1 each As Directed as needed. Dispense brand per patient's insurance at pharmacy discretion. (Patient taking differently: Use 1 each As Directed 4 (four) times a day. One Touch Verio strips), Disp: 400 each, Rfl: prn     generic lancets, Use 1 each As Directed 4 (four) times a day. Dispense brand per patient's insurance at pharmacy discretion., Disp: 400 each, Rfl: prn     lisinopril (PRINIVIL,ZESTRIL) 40 MG tablet, Take 1 tablet (40 mg total) by mouth daily., Disp: 90 tablet, Rfl: 3     nystatin (MYCOSTATIN) cream, Apply twice daily, Disp: 30 g, Rfl: 2     ONETOUCH DELICA LANCETS 33 gauge Misc, Inject 1 Box under the skin daily before breakfast., Disp: , Rfl:   No Known Allergies      Review of systems is negative for full 12 point review of systems.    Physical exam:  /71  Pulse 63  Wt (!) 226 lb (102.5 kg)  SpO2 98%  BMI 31.52 kg/m2  General:alert, appears stated age and cooperative  Lungs: chest clear, no wheezing, rales, normal symmetric air entry, Heart exam - S1, S2 normal, no murmur, no  gallop, rate regular  CV: regular rate and rhythm, S1, S2 normal, no murmur, click, rub or gallop  Abdomen: Mildly obese.  Fairly large ventral hernia in the lower abdomen bulging mostly to the left.  The hernia is easily reducible.      Impression: Fairly large ventral hernia.  I do think repair is indicated.  Discussed the options of laparoscopic versus open.  Because he has a fair amount of excess skin I would actually prefer to do this open so that we can remove some of the excess skin.  Explained the risk and benefits of surgery and he would like to proceed.  He does want to wait till November and is going to try to lose even a little more weight until then.    Plan: Ventral hernia repair with mesh.  We will set him up in November.  He will likely need to spend one night in the hospital.

## 2021-06-13 NOTE — ANESTHESIA POSTPROCEDURE EVALUATION
Patient: Juan Fleming  LAPAROTOMY, LYSIS OF ADHESIONS, MESH EXPLANTATION VENTRAL HERNIA REPAIR WITH MESH   Anesthesia type: general    Patient location: PACU  Last vitals:   Vitals:    11/03/17 1800   BP: 153/77   Pulse: 84   Resp: 17   Temp: 37.6  C (99.7  F)   SpO2: 96%     Post vital signs: stable  Level of consciousness: awake and responds to simple questions  Post-anesthesia pain: pain controlled  Post-anesthesia nausea and vomiting: no  Pulmonary: unassisted, return to baseline  Cardiovascular: stable and blood pressure at baseline  Hydration: adequate  Anesthetic events: no    QCDR Measures:  ASA# 11 - Henna-op Cardiac Arrest: ASA11B - Patient did NOT experience unanticipated cardiac arrest  ASA# 12 - Henna-op Mortality Rate: ASA12B - Patient did NOT die  ASA# 13 - PACU Re-Intubation Rate: ASA13B - Patient did NOT require a new airway mgmt  ASA# 10 - Composite Anes Safety: ASA10A - No serious adverse event    Additional Notes:

## 2021-06-13 NOTE — ANESTHESIA CARE TRANSFER NOTE
Last vitals:   Vitals:    11/03/17 1510   BP: 151/84   Pulse: 84   Resp: 16   Temp: 36.9  C (98.5  F)   SpO2: 95%     Patient's level of consciousness is drowsy  Spontaneous respirations: yes  Maintains airway independently: yes  Dentition unchanged: yes  Oropharynx: oropharynx clear of all foreign objects    QCDR Measures:  ASA# 20 - Surgical Safety Checklist: WHO surgical safety checklist completed prior to induction  PQRS# 430 - Adult PONV Prevention: 4558F - Pt received => 2 anti-emetic agents (different classes) preop & intraop  ASA# 8 - Peds PONV Prevention: NA - Not pediatric patient, not GA or 2 or more risk factors NOT present  PQRS# 424 - Henna-op Temp Management: 4559F - At least one body temp DOCUMENTED => 35.5C or 95.9F within required timeframe  PQRS# 426 - PACU Transfer Protocol: - Transfer of care checklist used  ASA# 14 - Acute Post-op Pain: ASA14B - Patient did NOT experience pain >= 7 out of 10

## 2021-06-13 NOTE — ANESTHESIA PREPROCEDURE EVALUATION
Anesthesia Evaluation      Patient summary reviewed   No history of anesthetic complications     Airway   Mallampati: II  Neck ROM: full   Pulmonary - normal exam   (+) a smoker (former)                         Cardiovascular - normal exam  (+) hypertension well controlled, ,      Neuro/Psych      Comments: H/o alcohol abuse    Endo/Other    (+) diabetes mellitus well controlled,      GI/Hepatic/Renal    (+)   chronic renal disease,      Other findings: SBO, NGT in place      Dental    (+) bridge                       Anesthesia Plan  Planned anesthetic: general endotracheal  - RSI, sux  - bilateral TAP block post op  ASA 3 - emergent   Induction: intravenous   Anesthetic plan and risks discussed with: patient  Anesthesia plan special considerations: video-assisted, rapid sequence induction,   Post-op plan: routine recovery

## 2021-06-13 NOTE — PROGRESS NOTES
Assessment/Plan:      Visit for Preoperative Exam.   Ventral hernia  Diabetes mellitus type 2 controlled with diet  History of alcohol abuse  Hypertension  Past history of pulmonary embolism    Patient approved for surgery with general or local anesthesia. Postoperative pain to be managed by surgeon during post-operative [employer] timeframe, typically 30-60 days for major surgery, and less for others. Labs will be done as indicated. Above recommendations were reviewed with the patient. Copy of the pre-op was given to the patient to bring along on the day of surgery. Follow up as needed. Proceed with proposed surgery without additional clinical clarifications. No Cardiology consultation or non-invasive testing. Low Risk Surgery. No active cardiac conditions.  BMP and hemogram are done today.  EKG is also copied for the patient.  Results will be faxed to surgery prior to this procedure.  There are no contraindications to this procedure per my examination today.    Subjective:   Patient has noticed a mass in the lower abdomen over the past year or 2.  It is enlarging.  It is now starting to bother him and get slightly painful.  He denies any bowel changes with this.  He is now scheduled for ventral hernia repair.    Scheduled Procedure: Ventral hernia repair.  Surgery Date: 11/6/2017  Surgery Location: [facility]  Surgeon: Dr. Maxine Rivera    Current Outpatient Prescriptions   Medication Sig Dispense Refill     aspirin 81 MG EC tablet Take 81 mg by mouth daily.       atorvastatin (LIPITOR) 10 MG tablet Take 1 tablet (10 mg total) by mouth daily. 90 tablet 3     blood glucose test strips Use 1 each As Directed as needed. Dispense brand per patient's insurance at pharmacy discretion. (Patient taking differently: Use 1 each As Directed 4 (four) times a day. One Touch Verio strips) 400 each prn     generic lancets Use 1 each As Directed 4 (four) times a day. Dispense brand per patient's insurance at  pharmacy discretion. 400 each prn     lisinopril (PRINIVIL,ZESTRIL) 40 MG tablet Take 1 tablet (40 mg total) by mouth daily. 90 tablet 3     ONETOUCH DELICA LANCETS 33 gauge Misc Inject 1 Box under the skin daily before breakfast.       nystatin (MYCOSTATIN) cream Apply twice daily 30 g 2     No current facility-administered medications for this visit.        No Known Allergies    Immunization History   Administered Date(s) Administered     Influenza, Seasonal, Inj PF 10/15/2013     Influenza, inj, historic 09/21/2016     Influenza, seasonal,quad inj 36+ mos 10/10/2015     Influenza,seasonal quad, PF 10/13/2014     Td, historic 01/06/2006     Tdap 06/15/2016       Patient Active Problem List   Diagnosis     Benign Prostatic Hypertrophy     History of pulmonary embolism     Muscle Spasm     Acrochordon     Essential hypertension     Anxiety     Alcohol abuse     Edema     Large bowel perforation     ARF (acute renal failure)     Anemia associated with acute blood loss     New onset type 2 diabetes mellitus     Hyperosmolar (nonketotic) coma     Hyperosmolarity due to secondary diabetes mellitus     Morbid obesity due to excess calories     SINDI (acute kidney injury)     Hyponatremia       Past Medical History:   Diagnosis Date     Alcohol abuse      Diabetes mellitus      Hernia 09/07/2017    Ventral     Hypertension      Influenza      PE (pulmonary embolism)      Perforated bowel        Social History     Social History     Marital status:      Spouse name: N/A     Number of children: N/A     Years of education: N/A     Occupational History     Not on file.     Social History Main Topics     Smoking status: Former Smoker     Packs/day: 0.50     Quit date: 1/26/2015     Smokeless tobacco: Never Used      Comment: has started again about 10 cigs. per day     Alcohol use No      Comment: quit drinkning 2014     Drug use: No     Sexual activity: Not on file     Other Topics Concern     Not on file     Social  History Narrative       Past Surgical History:   Procedure Laterality Date     ABDOMINAL SURGERY      Colen resection     FL APPENDECTOMY      Description: Appendectomy;  Recorded: 07/30/2008;     FL REPAIR INCISIONAL HERNIA,REDUCIBLE      Description: Ventral Hernia Repair;  Recorded: 07/30/2008;     FL REPAIR UMBILICAL ERAN,<4Y/O,REDUC      Description: Umbilical Hernia Repair;  Recorded: 07/30/2008;       History of Present Illness  Recent Health  Fever: no  Chills: no  Fatigue: no  Chest Pain: no  Cough: no  Dyspnea: no  Urinary Frequency: no  Nausea: no  Vomiting: no  Diarrhea: no  Abdominal Pain: yes  Easy Bruising: no  Lower Extremity Swelling: no    Pertinent History  Prior Anesthesia: yes  Previous Anesthesia Reaction:  no  Diabetes: yes, diet controlled.  A1c of 6.0.  Cardiovascular Disease: no  Pulmonary Disease: no  Renal Disease: no  GI Disease: no  Sleep Apnea: no  Thromboembolic Problems: no  Clotting Disorder: no  Bleeding Disorder: no    Social history of there are no concerns regarding care after surgery    After surgery, the patient plans to recover at home with family.    Review of Systems  Review of Systems   Constitutional: Negative.  Negative for fatigue and fever.   HENT: Negative.  Negative for congestion.    Eyes: Negative.    Respiratory: Negative.  Negative for cough and shortness of breath.    Cardiovascular: Negative.  Negative for chest pain.   Gastrointestinal: Negative for abdominal pain.        Complains of a mass in the lower abdomen just to the left of midline.   Endocrine: Negative.  Negative for polydipsia and polyuria.   Genitourinary: Negative.  Negative for frequency.   Musculoskeletal: Negative.    Skin: Negative.    Allergic/Immunologic: Negative.    Neurological: Negative.    Hematological: Negative.    Psychiatric/Behavioral: Negative.              Objective:         Vitals:    10/17/17 0803   BP: 100/62   Pulse: (!) 56   Resp: 16   Temp: 97.8  F (36.6  C)   TempSrc: Oral  "  Weight: 214 lb (97.1 kg)   Height: 5' 10\" (1.778 m)       Physical Exam:  Physical Exam   Constitutional: He is oriented to person, place, and time. He appears well-developed and well-nourished. No distress.   HENT:   Right Ear: External ear normal.   Left Ear: External ear normal.   Mouth/Throat: Oropharynx is clear and moist.   Eyes: Conjunctivae and EOM are normal. Pupils are equal, round, and reactive to light.   Neck: Normal range of motion. Neck supple. No JVD present. No thyromegaly present.   Cardiovascular: Normal rate and regular rhythm.    No murmur heard.  Pulmonary/Chest: Effort normal and breath sounds normal. No respiratory distress.   Abdominal: Soft. Bowel sounds are normal. There is no tenderness.   There is a fairly large ventral hernia present lower abdomen just left of midline.  This is reducible fairly easily.  Bowel sounds are active throughout.  No rebound, guarding or rigidity present.   Musculoskeletal: Normal range of motion. He exhibits no edema.   Varicosities noted in both lower extremities.  Pigmented changes noted from venous stasis.  Minimal edema noted today.   Lymphadenopathy:     He has no cervical adenopathy.   Neurological: He is alert and oriented to person, place, and time. No cranial nerve deficit.   Skin: Skin is warm and dry.   Psychiatric: He has a normal mood and affect.          "

## 2021-06-14 NOTE — TELEPHONE ENCOUNTER
Reason for Call:  Medication or medication refill:    Do you use a [facility]?  Name of the pharmacy and phone number for the current request: [facility] [phone]    Name of the medication requested: Patient has a new meter to check his blood sugar and needs a prescription sent to his pharmacy for Contour Next one strips (checks blood sugar one time a day) and Microlet lancets.  Other request: no    Can we leave a detailed message on this number? Yes    Phone number patient can be reached at: Other phone number:  Pharmacy    Best Time: anytime    Call taken on 1/26/2021 at 1:40 PM by Tri Pandey

## 2021-06-14 NOTE — PROGRESS NOTES
"HPI: Pt is here for follow up ventral hernia repair and lysis of adhesions with Dr. Rivera on 11/03/2017.   he is doing well.  Pain is well controlled.  No difficulties with the surgical wound/wounds. His drain has been putting out about 50mL/day of serous fluid. His only concern was nausea and vomiting (green, thick bile) on 11/08/2017 which improved with zofran prescribed by Dr. Pandya, and \"oatmeal colored stool\". He denies any abdominal pain. He does report that he is afraid he will \"bother\" something by eating regular food, but has been tolerating a regular diet without issue for the past few days.       /63    EXAM:  GENERAL:Appears well  ABDOMEN:  Soft, +BS, nontender, nondistended  SURGICAL WOUNDS:  Incisions healing well, no enduration or drainage, NICOLAS drain with less than 10mL serous fluid (drain was last drained early this morning); staple line healing and intact.      Assessment/Plan: Drain pulled and steri-strip was applied to the site. Staples were left in place and will be pulled on Friday with Dr. Rivera. Oatmeal colored stool is most likely related to his NG to suction in the hospital followed by vomiting bile last week, as well as nutritional changes. To rule out a metabolic process that could be contributing to the discoloration, I will get a comprehensive metabolic panel today. He has a scheduled appointment on Friday with Dr. Nicole Duron , [facility]       "

## 2021-06-14 NOTE — PROGRESS NOTES
Subjective:   Juan presents today for follow-up from his hospitalization.  He was admitted to the hospital with abdominal pain.  He was noted to have a bowel obstruction and a ventral hernia.  This was repaired by Dr. Ross.  He is now doing much better.  His bowels are still a little bit constipated in nature.  He feels like his blood sugars went up a little bit during the hospitalization and recovery time from his surgery.  He really does not want to check his blood sugars now.  Pain now is controlled fairly well.  He is getting back to a regular diet.  He controls his blood sugars with diet and exercise only.      Objective:  Neck: No increased JVD noted.  Lungs: Lungs are clear.  Patient's in no respiratory distress.  Cardiac: There is a regular rhythm present.  No murmur heard.  Abdomen: Abdomen is soft.  No rebound, guarding or rigidity present.  Bowel sounds are active throughout.  Abdominal wounds healed nicely.      Assessment:  1.  Ventral hernia status post repair  2.  Bowel obstruction secondary to adhesional disease  3.  Hypertension in good control  4.  Diabetes mellitus      Plan:  Patient will follow up in a month or 2 to have his diabetes rechecked.  He will increase his activity.  He will add fiber to his diet.  He will start probiotics to help with bowel regularity.  He can use softeners or laxatives on an as-needed basis only.  Continue lisinopril for blood pressure control.

## 2021-06-14 NOTE — TELEPHONE ENCOUNTER
Chart reviewed. Please review findings below.     Component      Latest Ref Rng & Units 11/3/2020   PSA      0.0 - 4.5 ng/mL 0.6

## 2021-06-14 NOTE — PROGRESS NOTES
Juan is in for follow-up of a Ventral hernia repair combined with an exploratory lap and lysis of adhesions.  He had actually shown up to the hospital with a bowel obstruction several days prior to what was going to be an elective ventral hernia repair.  I took him to the OR and found an obstruction but then also was able to fix his hernia at the same time.    He is now 2 weeks out from surgery.  Pain is very well controlled. No fevers. Eating fairly well.    Physical exam:  General: He is moving around well with no signs of discomfort.  Abdomen: Soft minimal tenderness.  The incision(s) is healing up nicely with no signs of infection.  Remove the staples today.      Impression: Doing well postoperatively.  No signs of complications.  Overall I think he is doing quite well.  Plan: Encouraged  to slowly get back to normal activities.  Told that it would be at least another 2-4 weeks before getting back to normal.  Follow-up with me will be as needed.

## 2021-06-14 NOTE — TELEPHONE ENCOUNTER
Refill Approved    Rx renewed per Medication Renewal Policy. Medication was last renewed on 6/18/20.    Natalia Burnette, [facility] Connection Triage/Med Refill 12/28/2020     Requested Prescriptions   Pending Prescriptions Disp Refills     atorvastatin (LIPITOR) 10 MG tablet [Pharmacy Med Name: Atorvastatin Calcium Oral Tablet 10 MG] 90 tablet 0     Sig: Take 1 tablet by mouth ONCE daily       Statins Refill Protocol (Hmg CoA Reductase Inhibitors) Passed - 12/26/2020 11:50 AM        Passed - PCP or prescribing provider visit in past 12 months      Last office visit with prescriber/PCP: Visit date not found OR same dept: 11/3/2020 Martine Ng MD OR same specialty: 11/3/2020 Martine Ng MD  Last physical: Visit date not found Last MTM visit: Visit date not found   Next visit within 3 mo: Visit date not found  Next physical within 3 mo: Visit date not found  Prescriber OR PCP: Sohan Isabel MD  Last diagnosis associated with med order: 1. Type 2 diabetes mellitus without complication, without long-term current use of insulin (H)  - atorvastatin (LIPITOR) 10 MG tablet [Pharmacy Med Name: Atorvastatin Calcium Oral Tablet 10 MG]; Take 1 tablet by mouth ONCE daily  Dispense: 90 tablet; Refill: 0    If protocol passes may refill for 12 months if within 3 months of last provider visit (or a total of 15 months).

## 2021-06-15 PROBLEM — N17.9 AKI (ACUTE KIDNEY INJURY) (H): Status: ACTIVE | Noted: 2017-02-28

## 2021-06-15 PROBLEM — E13.00: Status: ACTIVE | Noted: 2017-02-28

## 2021-06-15 PROBLEM — E87.1 HYPONATREMIA: Status: ACTIVE | Noted: 2017-02-28

## 2021-06-15 PROBLEM — E66.812 CLASS 2 SEVERE OBESITY DUE TO EXCESS CALORIES WITH SERIOUS COMORBIDITY AND BODY MASS INDEX (BMI) OF 35.0 TO 35.9 IN ADULT (H): Status: ACTIVE | Noted: 2017-02-28

## 2021-06-15 PROBLEM — E66.01 CLASS 2 SEVERE OBESITY DUE TO EXCESS CALORIES WITH SERIOUS COMORBIDITY AND BODY MASS INDEX (BMI) OF 35.0 TO 35.9 IN ADULT (H): Status: ACTIVE | Noted: 2017-02-28

## 2021-06-15 PROBLEM — E11.8 TYPE 2 DIABETES MELLITUS WITH COMPLICATION, WITHOUT LONG-TERM CURRENT USE OF INSULIN (H): Chronic | Status: ACTIVE | Noted: 2017-02-28

## 2021-06-15 NOTE — PROGRESS NOTES
Assessment/Plan:   Fever  Suspected 2019 novel coronavirus vs other viral infection  Diabetes type 2, controlled  Onset of fever T100.8 with chills, diarrhea, fatigue and body aches earlier today. No URI or cough or shortness of breath or ST. No leg swelling or calf pain. Had Moderna vaccine last week, tolerated. No known ill contacts or high risk potential exposures for covid. Had Flu vaccine in September. Has diabetes, today's morning number normal for him (95).   He is febrile but hemodynamically stable, exam reassuring though he appears to feel under the weather and low energy  Consider viral illness, unable to rule out covid-19 though rapid flu test was negative. Covid-19 test pending.   Could be delayed side effects from the Moderna vaccine though a week later has not been typical. Also consider a delayed adverse reaction to the vaccine which is more unlikely.   He is interested in monoclonal antibody treatment if his test is positive.   I discussed red flag symptoms, return precautions to clinic/ER and follow up care with patient/parent.  Expected clinical course, symptomatic cares advised. See below. Questions answered. Patient/parent amenable with plan.  - Influenza A/B Rapid Test  - Symptomatic COVID-19 Virus (CORONAVIRUS) PCR  - Ambulatory Referral for COVID Monoclonal Antibodies; Future  - COVID-19 GetWell Loop Referral    Fluids, rest  Tylenol if needed for fever or pain.   Vitamin C and D and zinc can be helpful to support your immune system.   Covid test is pending.   Meanwhile, rest at home but walk around in house or room sometimes and flex calf muscles, stand on toes etc to keep circulation going, continue your aspirin daily. Practice deep inhalation to fully expand your lungs and avoid atelectasis.   Watch for onset of cough, chest pain or tightness, shortness of breath, dehydration, loss of smell or taste and be seen again if needed either here or at the ER or with your primary clinic.    If you  have a pulse oximeter then values below 90% would indicate need to be seen in the ER, 92%-94% are of concern but if no shortness of breath or chest pain, can be observed and above 95% are reassuring.   If positive, you will be contacted regarding the result and will see it on MyChart.  You meet criteria to be considered for monoclonal antibody treatment and the nurse should contact you about that if your test is positive. I have also placed a referral for consideration of that pending your result.   I have also referred you to the Get Well Loop - this is a daily check in email that allows you to be in contact with health care personnel regarding your symptoms for 2 weeks following a positive test.     Staff and provider wore full PPE.     Subjective:      Juan Fleming is a 65 y.o. male who presents with his wife for evaluation of fever and feeling ill. He received the first Moderna vaccine last week and felt fine. He has been walking 3 miles every day up til yesterday. He even felt fine this morning when he got up but then developed fairly abrupt onset of fatigue, chills, body aches, diarrhea, fever and malaise. Loss of appetite some this evening. Both knees and both elbows are achy more than the muscles. No redness or swelling of the joints.   He did not feel up to going for a walk and slept this afternoon. He still felt unwell so they came in to the clinic for evaluation.   No chest pain. No significant shortness of breath other than a faint sensation of dyspnea which he attributes to fatigue. No rash, leg swelling, calf pain, N/V or GERD, no definite loss of taste or smell. No cough. No URI or ST. No urinary sxs.   No known ill contacts.   No high risk exposures for covid. He is taking all the precautions.   He has had the flu shot in September and the first Moderna covid vaccine dose last week.   He has diabetes - controlled well. He checks blood sugars daily in the morning and today his value was in his typical  range - 95.   Former smoker.     No Known Allergies    Current Outpatient Medications on File Prior to Visit   Medication Sig Dispense Refill     ascorbic acid, vitamin C, (VITAMIN C) 1000 MG tablet Take 2,000 mg by mouth daily as needed. Daily when patient feels he's getting sick       ascorbic acid, vitamin C, (VITAMIN C) 1000 MG tablet Take 2,000 mg by mouth.       aspirin 81 MG EC tablet Take 81 mg by mouth daily.       atorvastatin (LIPITOR) 10 MG tablet Take 10 mg by mouth.       lisinopriL (PRINIVIL,ZESTRIL) 10 MG tablet Take 1 tablet (10 mg total) by mouth daily. 90 tablet 2     acetaminophen (TYLENOL) 500 MG tablet Take 1,000 mg by mouth every 6 (six) hours as needed for pain.       acetaminophen (TYLENOL) 500 MG tablet Take 1,000 mg by mouth.       blood glucose test (CONTOUR NEXT TEST STRIPS) strips Use 1 each As Directed Daily at 8:00 am.. Dispense brand per patient's insurance at pharmacy discretion. 100 strip 6     blood glucose test (ONETOUCH VERIO) strips TEST 4 TIMES DAILY PER LANCET  strip 3     blood glucose test strips TEST 4 TIMES DAILY PER LANCET RX       generic lancets Use 1 each As Directed Daily at 8:00 am.. Dispense brand per patient's insurance at pharmacy discretion. 100 each 6     lancets 33 gauge Misc Inject under the skin.       neomycin-polymyxin-hydrocortisone (CORTISPORIN) 3.5-10,000-1 mg/mL-unit/mL-% otic suspension Place 4 Drops into left ear 4 times daily for 7 days.  0     ONETOUCH DELICA LANCETS 33 gauge Misc Inject 1 Box under the skin daily before breakfast.       ONETOUCH DELICA LANCETS 33 gauge Misc TEST 4 TIMES DAILY 360 each PRN     SHINGRIX, PF, 50 mcg/0.5 mL SusR injection        No current facility-administered medications on file prior to visit.      Patient Active Problem List   Diagnosis     Benign Prostatic Hypertrophy     History of pulmonary embolism     Acrochordon     Anxiety     Alcohol abuse     Large bowel perforation (H)     Type 2 diabetes mellitus  with complication, without long-term current use of insulin (H)     Hyperosmolarity due to secondary diabetes mellitus (H)     Class 2 severe obesity due to excess calories with serious comorbidity and body mass index (BMI) of 35.0 to 35.9 in adult (H)     SINDI (acute kidney injury) (H)     Hyponatremia     SBO (small bowel obstruction) (H)     Cellulitis of right leg     Type 2 diabetes mellitus treated without insulin (H)     Dyslipidemia     Benign essential HTN     Bunion, left       Objective:     /69   Pulse 92   Temp 99.7  F (37.6  C)   Resp 21   SpO2 97%     Physical  General Appearance: Alert, cooperative, no distress, low energy and mildly ill appearing. Low grade fever, VSS. Weight not recorded today but past reading was 230lbs 11/2020  Head: Normocephalic, without obvious abnormality, atraumatic  Eyes: Conjunctivae are normal.   Ears: Normal TMs and external ear canals, both ears, some wax on the left  Nose: No significant congestion.  Throat: Throat is normal.  No exudate.  No significant lesions  Neck: No adenopathy  Lungs: Clear to auscultation bilaterally, respirations unlabored  Heart: Regular rate and rhythm  Extremities: No lower extremity edema  Skin: Skin color, texture, turgor normal, no rashes or lesions  Psychiatric: Patient has a normal mood and affect.        Recent Results (from the past 24 hour(s))   Influenza A/B Rapid Test    Specimen: Nasal Swab; Nasopharyngeal (Inpt/ED) or Nasal Mucosa (Outpt)   Result Value Ref Range    Influenza  A, Rapid Antigen No Influenza A antigen detected No Influenza A antigen detected    Influenza B, Rapid Antigen No Influenza B antigen detected No Influenza B antigen detected

## 2021-06-15 NOTE — TELEPHONE ENCOUNTER
Temp 100.3, slight headache, chills, one episode of diarrhea.  Guidelines recommend calling PCP due to high risk patient. Temp is now 100.8 oral.     Paged  on call to [phone] via answering service, who recommended:    If urgent care still open patient may go there.  If develops chest pain or shortness of breath go to ER, otherwise may schedule virtual appointment tomorrow.      Patient wants to be seen tonight.    Reason for Disposition    [1] HIGH RISK patient (e.g., age > 64 years, diabetes, heart or lung disease, weak immune system) AND [2] new or worsening symptoms    Additional Information    Negative: SEVERE difficulty breathing (e.g., struggling for each breath, speaks in single words)    Negative: Difficult to awaken or acting confused (e.g., disoriented, slurred speech)    Negative: Bluish (or gray) lips or face now    Negative: Shock suspected (e.g., cold/pale/clammy skin, too weak to stand, low BP, rapid pulse)    Negative: Sounds like a life-threatening emergency to the triager    Negative: SEVERE or constant chest pain or pressure (Exception: mild central chest pain, present only when coughing)    Negative: MODERATE difficulty breathing (e.g., speaks in phrases, SOB even at rest, pulse 100-120)    Negative: [1] Headache AND [2] stiff neck (can't touch chin to chest)    Negative: MILD difficulty breathing (e.g., minimal/no SOB at rest, SOB with walking, pulse <100)    Negative: Chest pain or pressure    Negative: Patient sounds very sick or weak to the triager    Negative: Fever > 103 F (39.4 C)    Negative: [1] Fever > 101 F (38.3 C) AND [2] age > 60    Negative: [1] Fever > 100.0 F (37.8 C) AND [2] bedridden (e.g., nursing home patient, CVA, chronic illness, recovering from surgery)    [facility] Specific Disposition - [facility] Specific Patient Instructions  COVID 19 Nurse Triage Plan/Patient Instructions    Please be aware that novel coronavirus (COVID-19) may be circulating  in the community. If you develop symptoms such as fever, cough, or SOB or if you have concerns about the presence of another infection including coronavirus (COVID-19), please contact your health care provider or visit [email or web address].       In-Person Visit with provider recommended. Reference Visit Selection Guide.    Thank you for taking steps to prevent the spread of this virus.  Limit your contact with others.  Wear a simple mask to cover your cough.  Wash your hands well and often.    Resources  [facility]: About COVID-19: [email or web address]  CDC: What to Do If You're Sick: www.cdc.gov/coronavirus/2019-ncov/about/steps-when-sick.html  CDC: Ending Home Isolation: www.cdc.gov/coronavirus/2019-ncov/hcp/disposition-in-home-patients.html   CDC: Caring for Someone: www.cdc.gov/coronavirus/2019-ncov/if-you-are-sick/care-for-someone.html   [facility]: Interim Guidance for Hospital Discharge to Home: www.[facility].mn./diseases/coronavirus/hcp/hospdischarge.pdf  [facility] clinical trials (COVID-19 research studies): clinicalaffairs.[facility]-clinical-trials   Below are the COVID-19 hotlines at the [place] Department of Health ([facility]). Interpreters are available.   For health questions: Call 288-645-1701 or 1-522.344.5122 (7 a.m. to 7 p.m.)  For questions about schools and childcare: Call 261-954-3946 or 1-492.647.6905 (7 a.m. to 7 p.m.)    Protocols used: CORONAVIRUS (COVID-19) DIAGNOSED OR [id number] 1.3.21

## 2021-06-15 NOTE — PATIENT INSTRUCTIONS - HE
Fluids, rest  Tylenol if needed for fever or pain.   Vitamin C and D and zinc can be helpful to support your immune system.   Covid test is pending.   Watch for onset of cough, chest pain or tightness, shortness of breath, dehydration, loss of smell or taste and be seen again if needed either here or at the ER or with your primary clinic.    If you have a pulse oximeter then values below 90% would indicate need to be seen in the ER, 92%-94% are of concern but if no shortness of breath or chest pain, can be observed and above 95% are reassuring.   If positive, you will be contacted regarding the result and will see it on [facility] [facility].  You meet criteria to be considered for monoclonal antibody treatment and the nurse should contact you about that if your test is positive. I have also placed a referral for consideration of that pending your result.   I have also referred you to the Get Well Loop - this is a daily check in email that allows you to be in contact with health care personnel regarding your symptoms for 2 weeks following a positive test.     Discharge Instructions for COVID-19 Patients  You have--or may have--COVID-19. Please follow the instructions listed below.   If you have a weakened immune system, discuss with your doctor any other actions you need to take.  How can I protect others?  If you have symptoms (fever, cough, body aches or trouble breathing):    Stay home and away from others (self-isolate) until:  ? Your other symptoms have resolved (gotten better). And   ? You've had no fever--and no medicine that reduces fever--for 1 full day (24 hours). And   ? At least 10 days have passed since your symptoms started. (You may need to wait 20 days. Follow the advice of your care team.)  If you don't show symptoms, but testing showed that you have COVID-19:    Stay home and away from others (self-isolate) until at least 10 days have passed since the date of your first positive COVID-19 test.  During this  "time    Stay in your own room, even for meals. Use your own bathroom if you can.    Stay away from others in your home. No hugging, kissing or shaking hands. No visitors.    Don't go to work, school or anywhere else.    Clean \"high touch\" surfaces often (doorknobs, counters, handles). Use household cleaning spray or wipes.    You'll find a full list of  on the EPA website: www.epa.gov/pesticide-registration/list-n-disinfectants-use-against-sars-cov-2.    Cover your mouth and nose with a mask or other face covering to avoid spreading germs.    Wash your hands and face often. Use soap and water.    Caregivers in these groups are at risk for severe illness due to COVID-19:  ? People 65 years and older  ? People who live in a nursing home or long-term care facility  ? People with chronic disease (lung, heart, cancer, diabetes, kidney, liver, immunologic)  ? People who have a weakened immune system, including those who:    Are in cancer treatment    Take medicine that weakens the immune system, such as corticosteroids    Had a bone marrow or organ transplant    Have an immune deficiency    Have poorly controlled HIV or AIDS    Are obese (body mass index of 40 or higher)    Smoke regularly    Caregivers should wear gloves while washing dishes, handling laundry and cleaning bedrooms and bathrooms.    Use caution when washing and drying laundry: Don't shake dirty laundry and use the warmest water setting that you can.    For more tips on managing your health at home, go to www.cdc.gov/coronavirus/2019-ncov/downloads/10Things.pdf.  How can I take care of myself at home?  1. Get lots of rest. Drink extra fluids (unless a doctor has told you not to).  2. Take Tylenol (acetaminophen) for fever or pain. If you have liver or kidney problems, ask your family doctor if it's okay to take Tylenol.   Adults can take either:   ? 650 mg (two 325 mg pills) every 4 to 6 hours, or   ? 1,000 mg (two 500 mg pills) every 8 hours as " needed.  ? Note: Don't take more than 3,000 mg in one day. Acetaminophen is found in many medicines (both prescribed and over-the-counter medicines). Read all labels to be sure you don't take too much.   For children, check the Tylenol bottle for the right dose. The dose is based on the child's age or weight.  3. If you have other health problems (like cancer, heart failure, an organ transplant or severe kidney disease): Call your specialty clinic if you don't feel better in the next 2 days.  4. Know when to call 911. Emergency warning signs include:  ? Trouble breathing or shortness of breath  ? Pain or pressure in the chest that doesn't go away  ? Feeling confused like you haven't felt before, or not being able to wake up  ? Bluish-colored lips or face  5. Your doctor may have prescribed a blood thinner medicine. Follow their instructions.  Where can I get more information?    [facility] - About COVID-19:   [email or web address]    CDC - What to Do If You're Sick: www.cdc.gov/coronavirus/2019-ncov/about/steps-when-sick.html    CDC - Ending Home Isolation: www.cdc.gov/coronavirus/2019-ncov/hcp/disposition-in-home-patients.html    CDC - Caring for Someone: www.cdc.gov/coronavirus/2019-ncov/if-you-are-sick/care-for-someone.html    [facility] - Interim Guidance for Hospital Discharge to Home: www.health.[facility].[place].us/diseases/coronavirus/hcp/hospdischarge.pdf    Below are the COVID-19 hotlines at the [place] Department of Health ([facility]). Interpreters are available.  ? For health questions: Call 245-824-8504 or 1-121.215.3606 (7 a.m. to 7 p.m.)  ? For questions about schools and childcare: Call 998-942-4178 or 1-325.527.5441 (7 a.m. to 7 p.m.)    For informational purposes only. Not to replace the advice of your health care provider. Clinically reviewed by Dr. Porter Lemus.   Copyright   2020 [facility]. All rights reserved. [email or web address] - REV 01/05/21.

## 2021-06-15 NOTE — TELEPHONE ENCOUNTER
Refill Approved    Rx renewed per Medication Renewal Policy. Medication was last renewed on 1/10/20.    Melchor Merchant, Care Connection Triage/Med Refill 2/15/2021     Requested Prescriptions   Pending Prescriptions Disp Refills     lisinopriL (PRINIVIL,ZESTRIL) 10 MG tablet [Pharmacy Med Name: Lisinopril Oral Tablet 10 MG] 30 tablet 0     Sig: Take 1 tablet (10 mg total) by mouth daily.       [facility] Refill Protocol Passed - 2/14/2021 12:12 PM        Passed - PCP or prescribing provider visit in past 12 months       Last office visit with prescriber/PCP: 11/3/2020 Martine Ng MD OR same dept: 11/3/2020 Martine Ng MD OR same specialty: 11/3/2020 Martine Ng MD  Last physical: Visit date not found Last MTM visit: Visit date not found   Next visit within 3 mo: Visit date not found  Next physical within 3 mo: Visit date not found  Prescriber OR PCP: Martine Ng MD  Last diagnosis associated with med order: 1. Benign essential HTN  - lisinopriL (PRINIVIL,ZESTRIL) 10 MG tablet [Pharmacy Med Name: Lisinopril Oral Tablet 10 MG]; Take 1 tablet (10 mg total) by mouth daily.  Dispense: 30 tablet; Refill: 0    If protocol passes may refill for 12 months if within 3 months of last provider visit (or a total of 15 months).             Passed - Serum Potassium in past 12 months     Lab Results   Component Value Date    Potassium 4.8 11/03/2020             Passed - Blood pressure filed in past 12 months     BP Readings from Last 1 Encounters:   11/03/20 136/80             Passed - Serum Creatinine in past 12 months     Creatinine   Date Value Ref Range Status   11/03/2020 1.16 0.70 - 1.30 mg/dL Final

## 2021-06-15 NOTE — TELEPHONE ENCOUNTER
Received notification of patient's report of oxygenation level below 92% via GetWell Loop. Called patient and spoke with patient and wife per speaker phone. Wife reported completely the survey and meant to enter 95%. Patient was recently ill and seen in urgent care but all symptoms have resolved and patient and wife have no questions or concerns at this time. They will continue to reach out via GetWell Loop as needed.

## 2021-06-15 NOTE — PROGRESS NOTES
Subjective:   Juan presents for check of his diabetes and his blood pressure.  He is feeling well.  Denies any increased thirst or increased urination.  He denies headaches.  He denies palpitations or chest pains of any type.  He is continuing to maintain his weight.  He thinks his diet is good.  He is exercising routinely.  He is not on any medications for his diabetes.  He takes lisinopril for blood pressure control.  He denies any numbness or tingling in his fingers or toes.      Objective:  HEENT: Fundi are benign.  Pharynx clear.  Neck: No increased JVD present.  No bruits heard.  Cardiac: There is a regular rhythm present.  No ectopy heard.  No murmur present.  Musculoskeletal: No edema noted in the feet today.  Pulses are strong in upper and lower extremities.  Sensory exam normal.      Assessment:  1.  Diabetes mellitus which has been in excellent control  2.  Hypertension in good control      Plan:  Patient will continue all present medications.  Continue weight controlling diet.  Glycosylated hemoglobin is drawn today.  Follow-up here in 4 months for re-evaluation and recheck.

## 2021-06-16 PROBLEM — R01.1 HEART MURMUR: Status: ACTIVE | Noted: 2021-05-07

## 2021-06-16 PROBLEM — K56.609 SBO (SMALL BOWEL OBSTRUCTION) (H): Status: ACTIVE | Noted: 2017-11-01

## 2021-06-16 PROBLEM — L03.115 CELLULITIS OF RIGHT LEG: Status: ACTIVE | Noted: 2018-03-11

## 2021-06-16 PROBLEM — M21.612 BUNION, LEFT: Status: ACTIVE | Noted: 2019-05-28

## 2021-06-16 NOTE — PROGRESS NOTES
Subjective:   Juan is here for follow-up of a hospital admission.  He was admitted with a severe cellulitis of his right leg.  This came on very suddenly.  The erythema extended from his foot all the way to his knee and the lower thigh.  This was associated with significant pain.  He was treated with Zosyn in the hospital.  He was discharged on Augmentin.  He brings in pictures from the first day in the hospital what his leg looked like.  He denies any skin breakdown.  There has been no exudate.  He continues to have quite a bit of pain over the anterior tibial area.  Area of erythema was outlined in pen in the hospital and that outline is still visible.  He denies fevers, sweats or chills at this point.  Appetite is starting to improve.  He still feels somewhat tired.  White count was elevated going to the hospital.  This was normal by the time of discharge.  Blood cultures were negative in the hospital.  He has a history of diabetes.  He states his blood sugars are in good control.      Objective:  Lungs: Lungs today are clear.  Patient is in no respiratory distress.  Cardiac: There is a regular rhythm present.  No ectopy or murmur heard.  Musculoskeletal: The right leg is evaluated.  There is persistent erythema over the anterior tibial area.  This area is tender to palpation.  No exudate is seen.  Sensory exam of the toes appears normal.  There is a callused area noted between the first and second toe of the right foot but no skin breakdown present.  Calf is nontender today.  Knee has good range of motion.  Ankle has good range of motion.      Assessment:  1.  Cellulitis right leg slowly resolving  2.  Diabetes mellitus continuing in good control      Plan:  All labs were reviewed for the patient today.  I reviewed the blood culture results with him.  He was happy to know that these were negative.  He actually had thought they were positive.  He will continue Tylenol or ibuprofen for pain control.  Continue  Augmentin.  I would like to see him back here in 1 week for recheck.

## 2021-06-16 NOTE — TELEPHONE ENCOUNTER
Telephone Encounter by Jamee Anne LPN at 11/5/2019 12:49 PM     Author: Jamee Anne LPN Service: -- Author Type: Licensed Nurse    Filed: 11/5/2019 12:51 PM Encounter Date: 11/5/2019 Status: Signed    : Jamee Anne LPN (Licensed Nurse)       Patient Returning Call  Reason for call:  Patient returning call   Information relayed to patient:  Martine Ng MD           11/5/19 10:48 AM   Note      Please call Juan and let him know his A1-C is 6.2.     I suggest he take metformin 500mg daily while he takes the increased dose of blood pressure medication. The reason is that the metformin might help him lose weight and accomplish his goals sooner. However, if he prefers not to take the metformin, that's up to him. Let me know his preference -I'll only order the metformin if he plans to take it.  (assuming he loses weight in a few months, then he can stop the metformin, just like he can then stop the higher dose of BP med).      Patient has additional questions:  No  If YES, what are your questions/concerns:  Patient states he will take metformin  Requested to send prescription to [facility] # [id number].  Okay to leave a detailed message?: No

## 2021-06-16 NOTE — PROGRESS NOTES
Subjective:   Juan presents today for check of his cellulitis of his right leg.  He continues to have some pain but it has improved since last visit.  He gets pain with standing.  He gets pain with weightbearing and walking as well.  He continues to be off of work.  He denies fevers, sweats or chills at this point.  The swelling in the leg is gone down significantly from last visit.  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  At this point he is unable to do that.  He [place] in [place] forms to be completed as well today as he needs that for work.  He also has questions whether he is due to have his diabetes checked yet.  That has been under good control through his cellulitis infection.      Objective:  Cardiac: There is a regular rhythm present.  No murmur heard.  Lungs: Lungs today are clear.  Patient's in no respiratory distress.  Musculoskeletal: The right leg has persisting erythema over the anterior tibial area.  The tibia is slightly tender to palpation.  1+ edema noted in the leg.  Calf is soft.  There is mild tenderness over both medial and lateral malleoli of the ankle.  No skin breakdown present.  Calluses noted on the toes.  Bunion present in the right great toe.  No exudative process noted in the leg.      Assessment:  1.  Cellulitis right leg  2.  Diabetes mellitus which is been in good control  3.  Venous stasis dermatitis      Plan:  The patient will continue Augmentin through the next 3 days.  That will give him a total of 17 days of medication.  He will monitor for fevers.  He was slowly increase his activity.  He will continue off work over the next 2 weeks.  [facility] paperwork is completed for him today.  This should most likely be a one-time continuous time off of work.  Only if he would get a recurrent infection should this be episodic.  Follow-up here in 2 months to recheck his diabetes.  Follow-up sooner if any recurrent infection  symptoms arise.  Greater than 25 minutes was spent with patient today.  Greater than 50% of time was spent in consultation of cellulitis and filling out FMLA forms.

## 2021-06-17 NOTE — PROGRESS NOTES
DAVID Fleming is a 62 y.o. male here for follow up on cellulitis of his right lower leg. Here with his wife Sanjana today. He was hospitalized with sepsis from this about 3 weeks ago, and then underwent about 3 weeks of treatment with augmentin. He saw Dr. Izaguirre, his PCP, 2 days ago, and he was started on doxycycline. However, since the evening after that visit, he started having shooting pains on both sides of the lower leg. This was a new symptom. The leg has gotten less swollen since he's been wearing compression stockings, but it still looks red. Also, within the last day he noticed the toes on his right foot look purple. He has had no fevers since leaving the hospital on 3/15.Sanjana works for ID specialist Dr. Murray and she communicated with him about Juan's illness. She states that he told her doxycycline would not cover the proteus that was cultured from Juan's wound while inpatient, so they are wondering if the antibiotic should be switched. Also wondering if he should be checked for a blood clot.   8 years ago, Juan had small bilateral PEs. Took a blood thinner for 6 months.   Past Medical History:   Diagnosis Date     Alcohol abuse      Diabetes mellitus      Hernia 09/07/2017    [facility]     Hypertension      Influenza      PE (pulmonary embolism)      Perforated bowel      Current Outpatient Prescriptions on File Prior to Visit   Medication Sig Dispense Refill     acetaminophen (TYLENOL) 500 MG tablet Take 1,000 mg by mouth every 6 (six) hours as needed for pain.       ascorbic acid, vitamin C, (VITAMIN C) 1000 MG tablet Take 2,000 mg by mouth daily as needed. Daily when patient feels he's getting sick       aspirin 81 MG EC tablet Take 81 mg by mouth daily.       atorvastatin (LIPITOR) 10 MG tablet Take 1 tablet (10 mg total) by mouth daily. 90 tablet 3     blood glucose test strips Use 1 each As Directed as needed. Dispense brand per patient's insurance at pharmacy discretion. (Patient taking  "differently: Use 1 each As Directed 4 (four) times a day. One Touch Verio strips) 400 each prn     doxycycline (VIBRA-TABS) 100 MG tablet Take 1 tablet (100 mg total) by mouth 2 (two) times a day for 10 days. 28 tablet 1     generic lancets Use 1 each As Directed 4 (four) times a day. Dispense brand per patient's insurance at pharmacy discretion. 400 each prn     lisinopril (PRINIVIL,ZESTRIL) 20 MG tablet Take 1 tablet by mouth once daily 90 tablet 3     ONETOUCH DELICA LANCETS 33 gauge Misc Inject 1 Box under the skin daily before breakfast.       No current facility-administered medications on file prior to visit.        Past medical and social history reviewed with no changes.   ?  ROS:   Psych: increased anxiety over this illness  Gen: no unusual fatigue  See HPI  ?  O  /62  Pulse 60  Temp 98.1  F (36.7  C) (Oral)   Resp 16  Ht 5' 11\" (1.803 m)  Wt 216 lb 12 oz (98.3 kg)  BMI 30.23 kg/m2   Vitals reviewed. Nursing note reviewed.  General Appearance: Pleasant and alert, in no acute distress  HEENT: mucous membranes moist,   Ext: right lower leg has trace edema. Dark red in color. Not warm on palpation. No tightness or pain of calf. Toes do appear slightly purple. 2+ Dorsalis pedis pulses. No drainage from wound on right big toe.   Skin: warm, dry, intact, no rash noted  Neuro: no focal deficits, CNs II-XII normal.   A/P  Juan was seen today for leg pain.    Diagnoses and all orders for this visit:    Cellulitis of right leg: Because the leg has become more painful and he has the new darker color of his toes, the infection could be worsening and I will switch his antibiotic today. Reviewed culture results from hospital- sensitive to SMX/TMP, so will try this for 10 days. The erythema of his leg may be due to stasis dermatitis. Also placed referral to ID- he will plan to make an appt if the leg does not seem improved within 1 week. He will return to clinic or go to the ER if he develops a fever or if " his leg symptoms worsen.   -     sulfamethoxazole-trimethoprim (BACTRIM DS) 800-160 mg per tablet; Take 1 tablet by mouth 2 (two) times a day for 10 days.  -     Ambulatory referral to Infectious Disease    Pain and swelling of lower leg, right: He has a history of PEs and will rule out DVT today.   -     US Venous Leg Right; Future     Options for treatment and follow-up care were reviewed with the patient and/or guardian. Juan Fleming and/or guardian engaged in the decision making process and verbalized understanding of the options discussed and agreed with the final plan.    Tiesha Emmanuel MD

## 2021-06-17 NOTE — PROGRESS NOTES
OFFICE VISIT NOTE      Assessment & Plan   Juan Fleming is a 66 y.o. male here for an annual wellness visit. He's doing pretty well keeping up with his health. Best of all, he's eating healthily and exercising, and losing weight! I strongly encouraged him with this. What he is doing now is going to make a big difference in his quality of life now and in the future.    Shaved his foot calluses to avoid pain with walking.    He will call to make his eye appt, he'll also catch up on dental appt    Diagnoses and all orders for this visit:    Type 2 diabetes mellitus treated without insulin (H)  -     Ambulatory referral to Ophthalmology    Dyslipidemia    Type 2 diabetes mellitus with complication, without long-term current use of insulin (H)    Class 2 severe obesity due to excess calories with serious comorbidity and body mass index (BMI) of 35.0 to 35.9 in adult (H)    Callus of foot    Other orders  -     Cancel: Glycosylated Hemoglobin A1c  -     Cancel: Lipid [place] FASTING        Martine Ng MD            Subjective:   Chief Complaint:  Annual Wellness Visit    66 y.o. male.     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 He's glad to be losing weight and his wife can't handle their dog outside.    2) eating really healthily and feeling good/satisfied    3) discussed Adv directive, colonoscopy, waiting on other vaccinations due to Covid, diabetic eye appointment    4) calluses on feet    Current Outpatient Medications   Medication Sig     acetaminophen (TYLENOL) 500 MG tablet Take 1,000 mg by mouth every 6 (six) hours as needed for pain.     ascorbic acid, vitamin C, (VITAMIN C) 1000 MG tablet Take 2,000 mg by mouth daily as needed. Daily when patient feels he's getting sick     aspirin 81 MG EC tablet Take 81 mg by mouth daily.     atorvastatin (LIPITOR) 10 MG tablet Take 1 tablet (10 mg total) by mouth daily.     blood glucose test (CONTOUR  "NEXT TEST STRIPS) strips Use 1 each As Directed Daily at 8:00 am.. Dispense brand per patient's insurance at pharmacy discretion.     blood glucose test strips TEST 4 TIMES DAILY PER LANCET RX     generic lancets Use 1 each As Directed Daily at 8:00 am.. Dispense brand per patient's insurance at pharmacy discretion.     lancets 33 gauge Misc Inject under the skin.     lisinopriL (PRINIVIL,ZESTRIL) 10 MG tablet Take 1 tablet (10 mg total) by mouth daily.     acetaminophen (TYLENOL) 500 MG tablet Take 1,000 mg by mouth.     ascorbic acid, vitamin C, (VITAMIN C) 1000 MG tablet Take 2,000 mg by mouth.     neomycin-polymyxin-hydrocortisone (CORTISPORIN) 3.5-10,000-1 mg/mL-unit/mL-% otic suspension Place 4 Drops into left ear 4 times daily for 7 days.     SHINGRIX, PF, 50 mcg/0.5 mL SusR injection        PSFHx: appropriate sections of PMH completed/filled in   Tobacco Status:  He  reports that he quit smoking about 6 years ago. He smoked 0.50 packs per day. He has never used smokeless tobacco.    Review of Systems:  No fever.  No rash. All other systems negative except as noted above.    Objective:    /74   Pulse 61   Temp 98  F (36.7  C) (Oral)   Resp 19   Ht 5' 9\" (1.753 m)   Wt 221 lb 12 oz (100.6 kg)   SpO2 95%   BMI 32.75 kg/m    GENERAL: No acute distress.  Ht: reg s1s2  Lungs; clear    [unfilled]  Right foot- one callus on the base of the great toe (1.5cm wide and very thick) and one medially on the great toe - both shaved until the skin was flexible.  left foot great toe- medial callus very thick and about 0.8 x 0.4 in size. shaved  No complications with shaving    "

## 2021-06-17 NOTE — PROGRESS NOTES
Subjective:   Juan comes in today for recheck of his right leg.  He still has some pain in the leg.  The redness has not totally gone away.  He had a cellulitis diagnosed a month ago.  He has now been on almost 4 weeks of antibiotic therapy.  He continues to have swelling in the leg.  Pain worsens with prolonged standing or walking.  He now is sleeping fairly well.  He definitely has improved from diagnosis but he feels like he should be feeling better than he is.      Objective:  Musculoskeletal: The right leg is evaluated.  There is persistent erythema over the pretibial area.  The calf is minimally tender.  There is tenderness noted over the anterior tibial bone.  There is 2+ edema noted in the right leg and 1+ in the left leg.  Skin integrity is normal.  No exudative process seen.  Varicosities noted bilaterally.      Assessment:  1.  Cellulitis right leg slowly resolving on Augmentin      Plan:  We had a long discussion about the infection today.  I feel the erythema should be resolved after 4 weeks of antibiotics.  We have decided to change to doxycycline 100 mg twice daily.  He will try using a compression stocking to keep swelling from occurring in the leg.  This may assist with solving some of the erythema.  Follow-up here in 1-2 weeks for recheck.  He will follow-up sooner if any new symptoms would arise.  At this time activity will be as tolerated.  He can return to work next week.

## 2021-06-17 NOTE — PROGRESS NOTES
"    Assessment & Plan     Juan was seen today for chest pain.    Diagnoses and all orders for this visit:    Left-sided chest pain  -     XR Chest 2 Views    Heart murmur    Type 2 diabetes mellitus treated without insulin (H)  -     Glycosylated Hemoglobin A1c  -     atorvastatin (LIPITOR) 10 MG tablet; Take 1 tablet (10 mg total) by mouth daily.  -     JIC RED      Pain of abdominal wall muscles near lower ribs.    Murmur, not previously noted.  Consistent with aortic stenosis.  Ectatic aorta on CXR might be associated.  Asymptomatic.  Consider echocardiogram in the future.      35 minutes spent on the date of the encounter doing chart review, review of test results, interpretation of tests and patient visit regarding chest wall pain, murmur, diabetes.       BMI:   Estimated body mass index is 33.53 kg/m  as calculated from the following:    Height as of this encounter: 5' 9.29\" (1.76 m).    Weight as of this encounter: 229 lb (103.9 kg).       Return in about 27 days (around 5/25/2021) for Annual physical.    Sohan Isabel MD  [facility]   Juan Fleming is 66 y.o. and presents today for the following health issues   HPI     Pain at left costal margin for 5 weeks.  Feels better after he walks 3 miles.  Tylenol helps.    Also has pain of left rhomboid muscle area.    No dyspnea on exertion.  No reflux.  No dizziness, fever or cough.      Current Outpatient Medications   Medication Sig Dispense Refill     acetaminophen (TYLENOL) 500 MG tablet Take 1,000 mg by mouth every 6 (six) hours as needed for pain.       acetaminophen (TYLENOL) 500 MG tablet Take 1,000 mg by mouth.       aspirin 81 MG EC tablet Take 81 mg by mouth daily.       atorvastatin (LIPITOR) 10 MG tablet Take 1 tablet (10 mg total) by mouth daily. 90 tablet 3     blood glucose test (CONTOUR NEXT TEST STRIPS) strips Use 1 each As Directed Daily at 8:00 am.. Dispense brand per patient's insurance at pharmacy " "discretion. 100 strip 6     generic lancets Use 1 each As Directed Daily at 8:00 am.. Dispense brand per patient's insurance at pharmacy discretion. 100 each 6     lisinopriL (PRINIVIL,ZESTRIL) 10 MG tablet Take 1 tablet (10 mg total) by mouth daily. 90 tablet 2     ascorbic acid, vitamin C, (VITAMIN C) 1000 MG tablet Take 2,000 mg by mouth daily as needed. Daily when patient feels he's getting sick       ascorbic acid, vitamin C, (VITAMIN C) 1000 MG tablet Take 2,000 mg by mouth.       blood glucose test (ONETOUCH VERIO) strips TEST 4 TIMES DAILY PER LANCET  strip 3     blood glucose test strips TEST 4 TIMES DAILY PER LANCET RX       lancets 33 gauge Misc Inject under the skin.       neomycin-polymyxin-hydrocortisone (CORTISPORIN) 3.5-10,000-1 mg/mL-unit/mL-% otic suspension Place 4 Drops into left ear 4 times daily for 7 days.  0     ONETOUCH DELICA LANCETS 33 gauge Misc Inject 1 Box under the skin daily before breakfast.       ONETOUCH DELICA LANCETS 33 gauge Misc TEST 4 TIMES DAILY 360 each PRN     SHINGRIX, PF, 50 mcg/0.5 mL SusR injection        No current facility-administered medications for this visit.          Review of Systems  No fever or cough.      Objective    /68 (Patient Site: Left Arm, Patient Position: Sitting, Cuff Size: Adult Regular)   Pulse (!) 48   Temp 97.9  F (36.6  C) (Oral)   Ht 5' 9.29\" (1.76 m)   Wt (!) 229 lb (103.9 kg)   SpO2 100%   BMI 33.53 kg/m    Body mass index is 33.53 kg/m .  Physical Exam  Heart: 2/6 systolic ejection murmur heard best at URSB, consistent with aortic stenosis.  Lungs normal  Throat normal  Back with sore left rhomboid  Abdomen: sore left costal margin.  No HSM    A1c = 5.9    CXR normal except for ectatic aorta, old rib fractures                  "

## 2021-06-19 NOTE — LETTER
Letter by Martine Ng MD at      Author: Martine Ng MD Service: -- Author Type: --    Filed:  Encounter Date: 6/10/2019 Status: (Other)        [facility] PATIENT ACCESS  [address] [place] [address] [address]  [facility] [address]  [phone]         Juan Fleming  [address] [place] [address]        06/10/19    Dear Juan Fleming,     At [facility] we care about your health and well-being. Your primary care provider is committed to ensuring you receive high quality care and has chosen a network of specialists to assist in providing that care. Recently Dr. Ng referred you to Ophthalmology for specialty care.       It is important to your overall health to follow through with the recommendation from your provider. Please call [facility] at [phone] at your earliest convenience for assistance in scheduling an appointment.  If you have already scheduled this appointment, please disregard this notice.  Thank you for choosing [facility] for your healthcare needs.       Sincerely,       [facility] Specialty Scheduling

## 2021-06-19 NOTE — LETTER
Letter by Martine Ng MD at      Author: Martine Ng MD Service: -- Author Type: --    Filed:  Encounter Date: 11/6/2019 Status: Signed         Juan Fleming  [address] [place] [address]             November 6, 2019         Dear Mr. Fleming,    Below are the results from your recent visit:    Resulted Orders   Glycosylated Hemoglobin A1c   Result Value Ref Range    Hemoglobin A1c 6.2 (H) 3.5 - 6.0 %   HIV Antigen/Antibody Diagnostic Cascade   Result Value Ref Range    HIV Antigen / Antibody Negative Negative    Narrative    Method is Abbott HIV Ag/Ab for the detection of HIV p24 antigen, HIV-1 antibodies and HIV-2 antibodies.   Hepatitis C Antibody (Anti-HCV)   Result Value Ref Range    Hepatitis C Ab Negative Negative   Basic Metabolic Panel   Result Value Ref Range    Sodium 139 136 - 145 mmol/L    Potassium 5.0 3.5 - 5.0 mmol/L    Chloride 104 98 - 107 mmol/L    CO2 28 22 - 31 mmol/L    Anion Gap, Calculation 7 5 - 18 mmol/L    Glucose 95 70 - 125 mg/dL    Calcium 9.6 8.5 - 10.5 mg/dL    BUN 25 (H) 8 - 22 mg/dL    Creatinine 1.11 0.70 - 1.30 mg/dL    GFR MDRD Af Amer >60 >60 mL/min/1.73m2    GFR MDRD Non Af Amer >60 >60 mL/min/1.73m2    Narrative    Fasting Glucose reference range is 70-99 mg/dL per  American Diabetes Association (ADA) guidelines.   HM1 (CBC with Diff)   Result Value Ref Range    WBC 6.2 4.0 - 11.0 thou/uL    RBC 5.29 4.40 - 6.20 mill/uL    Hemoglobin 14.5 14.0 - 18.0 g/dL    Hematocrit 44.1 40.0 - 54.0 %    MCV 83 80 - 100 fL    MCH 27.4 27.0 - 34.0 pg    MCHC 32.9 32.0 - 36.0 g/dL    RDW 13.6 11.0 - 14.5 %    Platelets 217 140 - 440 thou/uL    MPV 7.2 7.0 - 10.0 fL    Neutrophils % 65 50 - 70 %    Lymphocytes % 25 20 - 40 %    Monocytes % 8 2 - 10 %    Eosinophils % 2 0 - 6 %    Basophils % 1 0 - 2 %    Neutrophils Absolute 4.0 2.0 - 7.7 thou/uL    Lymphocytes Absolute 1.5 0.8 - 4.4 thou/uL    Monocytes Absolute 0.5 0.0 - 0.9 thou/uL    Eosinophils Absolute 0.1 0.0 -  0.4 thou/uL    Basophils Absolute 0.0 0.0 - 0.2 thou/uL       I hope you feel fine on the metformin and on the lisinopril-hydrochlorothiazide. Please be good about eating healthily and getting daily activity in. Decide on your goal and push toward it!    Please call with questions or contact us using [employer].    Sincerely,        Electronically signed by Martine Ng MD

## 2021-06-20 NOTE — PROGRESS NOTES
Subjective:   Juan comes in for check of his diabetes.  He is feeling well.  He has no health issues or concerns today.  He denies polyuria or polydipsia.  Weight has gone up slightly.  He has no chest pains.  He denies lightheadedness.  He denies vision changes.  He has had no numbness in fingers or toes.  Presently his blood sugars are controlled with diet only.  He also has a history of hyperlipidemia.  He takes atorvastatin for control.      Objective:  HEENT: Fundi appear benign.  Pharynx is clear.  Neck: No bruits heard.  No lymphadenopathy present.  No increased JVD present.  Lungs: Lungs are clear bilaterally.  Patient is in no respiratory distress.  Cardiac: There is a regular rhythm present.  No murmur heard.  Neurologic: No numbness noted in the toes today.  Skin integrity normal.  Pulses are strong.      Assessment:  1.  Diabetes mellitus which has been in good control with diet only  2.  Hyperlipidemia    Plan:  Check a glycosylated hemoglobin today.  Lipid cascade and hepatic panel also will be drawn.  Patient will be called with abnormalities.  Continue atorvastatin for cholesterol control.  Maintain weight to assist with blood sugar control.

## 2021-06-24 NOTE — TELEPHONE ENCOUNTER
Left voice messag:  Clinic staff is calling to remind you that Dr. Izaguirre has recently retired from practice at the [facility].    Please call [facility] at [phone] at your earliest convenience to schedule an (Establish Care) appointment with a different provider at our clinic, or to notify us that you already have a new provider.    Thank you.

## 2021-06-25 NOTE — TELEPHONE ENCOUNTER
FYI - Status Update  Who is Calling: Patient  Update: Patient scheduled an establish care appointment with Dr. Ng on 5/28 at 1:40 PM. He noted that clinic has been trying ot reach him on his home phone but the best number is his mobile phone number which is flagged as primary.   Okay to leave a detailed message?:  No return call needed

## 2021-06-25 NOTE — TELEPHONE ENCOUNTER
Former patient of Zina & has not established care with another provider.  Please assign refill request to covering provider per Clinic standard process.

## 2021-06-25 NOTE — PROGRESS NOTES
"Progress Notes by Sameer Will DO at 3/17/2017  7:40 PM     Author: Sameer Will DO Service: -- Author Type: Physician    Filed: 3/18/2017  8:10 AM Encounter Date: 3/17/2017 Status: Signed    : Sameer Will DO (Physician)       Chief Complaint   Patient presents with   ? Fever     x 1 day. Fever 101F today, fatigue and nausea. Headache adn slight cough. Was given tamiflu 2 weeks.        History of Present Illness: Nursing notes reviewed. Patient states he was treated with Tamiflu when his wife tested positive for Influenza A about a month ago. He recovered from this illness, then fell ill again today. No shortness of breath. He has had a little cough.    Review of systems: See history of present illness, otherwise negative.     Current Outpatient Prescriptions   Medication Sig Dispense Refill   ? aspirin 81 MG EC tablet Take 81 mg by mouth daily.     ? blood glucose meter (GLUCOMETER) Use 1 each As Directed as needed. Dispense glucometer brand per patient's insurance at pharmacy discretion. 1 each 0   ? blood glucose test strips Use 1 each As Directed as needed. Dispense brand per patient's insurance at pharmacy discretion. 400 each prn   ? fosinopril (MONOPRIL) 20 MG tablet Take 1 tablet (20 mg total) by mouth daily. 90 tablet 3   ? generic lancets Use 1 each As Directed 4 (four) times a day. Dispense brand per patient's insurance at pharmacy discretion. 400 each prn   ? insulin syringe-needle U-100 (BD INSULIN SYRINGE) 1 mL 25 gauge x 5/8\" Syrg Use 1 Device As Directed 3 (three) times a day as needed. 100 each 3   ? LANTUS SOLOSTAR 100 unit/mL (3 mL) pen Inject 30 Units under the skin bedtime. 11.65 Type 2 with hyperglycemia 3 mL PRN   ? metoprolol succinate (TOPROL-XL) 100 MG 24 hr tablet Take 100 mg by mouth daily.     ? nystatin (MYCOSTATIN) cream Apply twice daily 30 g 2   ? ONETOUCH DELICA LANCETS 33 gauge Misc Inject 1 Box under the skin daily before breakfast.     ? ONETOUCH VERIO SYSTEM Misc " "Inject 1 Box under the skin three times daily at 7:30am, 11:30am and 4:30pm.     ? pen needle, diabetic (BD ULTRA-FINE ADEN PEN NEEDLES) 32 gauge x 5/32\" Ndle Use 1 each As Directed 4 (four) times a day. 400 each prn   ? amoxicillin-clavulanate (AUGMENTIN) 875-125 mg per tablet Take 1 tablet by mouth 2 (two) times a day for 10 days. 20 tablet 0   ? NOVOLOG FLEXPEN 100 unit/mL injection pen Check blood sugar AC/HS  11.65 Type 2 with hyperglycemia  Flex Pen Needles - BD Ultra-fine Aden Pen Needles - NDC [address]-91 - dispense 1 case, refill PRN for 1 year 3 mL PRN     No current facility-administered medications for this visit.        Past Medical History:   Diagnosis Date   ? Alcohol abuse    ? Diabetes mellitus    ? Hypertension    ? Influenza    ? PE (pulmonary embolism)    ? Perforated bowel       Past Surgical History:   Procedure Laterality Date   ? ABDOMINAL SURGERY      Colen resection   ? [facility] APPENDECTOMY      Description: Appendectomy;  Recorded: 07/30/2008;   ? [facility] REPAIR INCISIONAL HERNIA,REDUCIBLE      Description: Ventral Hernia Repair;  Recorded: 07/30/2008;   ? [facility] REPAIR UMBILICAL ERAN,<6Y/O,REDUC      Description: Umbilical Hernia Repair;  Recorded: 07/30/2008;     Social History     Social History   ? Marital status:      Spouse name: N/A   ? Number of children: N/A   ? Years of education: N/A     Social History Main Topics   ? Smoking status: Former Smoker     Packs/day: 0.50     Quit date: 1/26/2015   ? Smokeless tobacco: Never Used      Comment: has started again about 10 cigs. per day   ? Alcohol use No      Comment: quit drinkning 2014   ? Drug use: No   ? Sexual activity: Not Asked     Other Topics Concern   ? None     Social History Narrative       History   Smoking Status   ? Former Smoker   ? Packs/day: 0.50   ? Quit date: 1/26/2015   Smokeless Tobacco   ? Never Used     Comment: has started again about 10 cigs. per day      Exam:   Blood pressure (!) 122/92, pulse 86, temperature " 99.1  F (37.3  C), temperature source Oral, resp. rate 18, weight (!) 324 lb (147 kg), SpO2 94 %.    EXAM:   General: Vital signs reviewed. Patient is in no acute appearing distress with rare cough. Breathing is non labored appearing. Patient is alert and oriented x 3.   ENT: Ear exam shows clear TMs with no injection, left nasal turbinates are injected and edematous with increased purulent rhinorrhea, no pharyngeal injection noted.  Neck: supple with no adenopathy  Heart: Normal rate and rhythm without murmur  Lungs: Clear to auscultation with good air flow bilaterally.  Skin: mildly febrile and dry    Recent Results (from the past 24 hour(s))   HM1 (CBC with Diff)   Result Value Ref Range    WBC 7.7 4.0 - 11.0 thou/uL    RBC 5.40 4.40 - 6.20 mill/uL    Hemoglobin 14.2 14.0 - 18.0 g/dL    Hematocrit 44.9 40.0 - 54.0 %    MCV 83 80 - 100 fL    MCH 26.3 (L) 27.0 - 34.0 pg    MCHC 31.6 (L) 32.0 - 36.0 g/dL    RDW 15.2 (H) 11.0 - 14.5 %    Platelets 257 140 - 440 thou/uL    MPV 9.9 8.5 - 12.5 fL    Neutrophils % 91 (H) 50 - 70 %    Lymphocytes % 5 (L) 20 - 40 %    Monocytes % 4 2 - 10 %    Eosinophils % 1 0 - 6 %    Basophils % 0 0 - 2 %    Neutrophils Absolute 7.0 2.0 - 7.7 thou/uL    Lymphocytes Absolute 0.4 (L) 0.8 - 4.4 thou/uL    Monocytes Absolute 0.3 0.0 - 0.9 thou/uL    Eosinophils Absolute 0.1 0.0 - 0.4 thou/uL    Basophils Absolute 0.0 0.0 - 0.2 thou/uL   Glucose   Result Value Ref Range    Glucose 187 (H) 74 - 125 mg/dL    Patient Fasting > 8hrs? No     Results from exam reviewed with patient.    Assessment/Plan   1. Fever  HM1(CBC and Differential)    HM1 (CBC with Diff)   2. Diabetes  Glucose    Glucose   3. Sinusitis  amoxicillin-clavulanate (AUGMENTIN) 875-125 mg per tablet       Patient Instructions   Also see info below. Be seen again in 3-4 days if symptoms are not better, sooner if feeling any worse.    Acute Sinusitis  Acute sinusitis is inflammation (irritation and swelling) of the sinuses. It is  usually due to a viral infection of the sinuses, although bacteria may be involved in prolonged cases. This may follow a cold or other upper respiratory illness. Your doctor can help you find relief. Read on to learn more.       What is acute sinusitis?  Sinuses are air-filled spaces in the skull behind the face. They are kept moist and clean by a lining of mucosa. Things such as pollen, smoke, and chemical fumes can irritate the mucosa. It can then become inflamed (swell up). As a response to irritation, the mucosa makes more mucus and other fluids. Tiny hairlike cilia cover the mucosa. Cilia help transport mucus toward the opening of the sinus. Too much mucus may cause the cilia to stop working. This blocks the sinus opening. A buildup of fluid in the sinuses then leads to symptoms such as pain and pressure. It can also encourage growth of bacteria in the sinuses.  Common symptoms of acute sinusitis  You may have:    Facial pain    Headache    Fever    Postnasal drip    Nasal congestion    Redness of facial skin over sinus  Diagnosis of acute sinusitis  The doctor will ask about your symptoms and medical history. An evaluation will be done. A culture (sample of mucus) is sometimes taken to check for bacteria. If you have multiple bouts of sinusitis, imaging (X-rays or CAT scans) may be done to check for an anatomic cause of the infection.  Treatment of acute sinusitis  Treatment is designed to unblock the sinus opening and help the cilia work again. Antihistamine and decongestant medications may be prescribed. These can reduce inflammation and decrease fluid production. If a bacterial infection is present, it can be treated with antibiotic medication. This medication should be taken until it is gone, even if you feel better. However, most sinusitis is caused by viruses, antibiotics will not help a viral infection.    9371-0176 [employer]. [address] [place] [address], [place] [address]. All rights  reserved. This information is not intended as a substitute for professional medical care. Always follow your healthcare professional's instructions.           Sameer Will, DO

## 2021-06-28 NOTE — PROGRESS NOTES
Progress Notes by Angie Carrillo LPN at 2/5/2020  1:30 PM     Author: Angie Carrillo LPN Service: -- Author Type: Licensed Nurse    Filed: 2/5/2020  3:15 PM Encounter Date: 2/5/2020 Status: Attested    : Angie Carrillo LPN (Licensed Nurse) Cosigner: Martine Ng MD at 2/5/2020  6:14 PM    Attestation signed by Martine Ng MD at 2/5/2020  6:14 PM    thx                I met with Juan Fleming at the request of Dr. Ng to recheck his blood pressure.  Blood pressure medications on the MAR were reviewed with patient.    Patient has taken all medications as per usual regimen: Yes  Patient reports tolerating them without any issues or concerns: Yes    Vitals:    02/05/20 1341 02/05/20 1346   BP: 128/64 124/62   Patient Site: Left Arm Left Arm   Patient Position: Sitting Sitting   Cuff Size: Adult Large Adult Large   Pulse: 60    Weight: 217 lb (98.4 kg)        Blood pressure was taken, previous encounter was reviewed, recorded blood pressure below 140/90.  Patient was discharged and the note will be sent to the provider for final review.

## 2021-07-03 NOTE — ADDENDUM NOTE
Addendum Note by Perla Molina MA at 10/17/2017  8:36 AM     Author: Perla Molina MA Service: -- Author Type: Medical Assistant    Filed: 10/17/2017  8:36 AM Encounter Date: 10/17/2017 Status: Signed    : Perla Molina MA (Medical Assistant)    Addended by: PERLA MOLINA on: 10/17/2017 08:36 AM        Modules accepted: Orders

## 2021-07-06 VITALS
TEMPERATURE: 98 F | SYSTOLIC BLOOD PRESSURE: 126 MMHG | HEART RATE: 61 BPM | DIASTOLIC BLOOD PRESSURE: 74 MMHG | HEIGHT: 69 IN | OXYGEN SATURATION: 95 % | WEIGHT: 221.75 LBS | RESPIRATION RATE: 19 BRPM | BODY MASS INDEX: 32.84 KG/M2

## 2021-07-14 PROBLEM — E11.01 HYPEROSMOLAR (NONKETOTIC) COMA (H): Status: RESOLVED | Noted: 2017-02-28 | Resolved: 2019-05-28

## 2021-09-12 ENCOUNTER — HEALTH MAINTENANCE LETTER (OUTPATIENT)
Age: 66
End: 2021-09-12

## 2021-11-07 ENCOUNTER — HEALTH MAINTENANCE LETTER (OUTPATIENT)
Age: 66
End: 2021-11-07

## 2021-12-22 ENCOUNTER — OFFICE VISIT (OUTPATIENT)
Dept: FAMILY MEDICINE | Facility: CLINIC | Age: 66
End: 2021-12-22
Payer: COMMERCIAL

## 2021-12-22 VITALS
HEART RATE: 63 BPM | DIASTOLIC BLOOD PRESSURE: 70 MMHG | RESPIRATION RATE: 18 BRPM | WEIGHT: 219.5 LBS | SYSTOLIC BLOOD PRESSURE: 126 MMHG | BODY MASS INDEX: 32.41 KG/M2 | TEMPERATURE: 98.6 F | OXYGEN SATURATION: 96 %

## 2021-12-22 DIAGNOSIS — B35.1 ONYCHOMYCOSIS: ICD-10-CM

## 2021-12-22 DIAGNOSIS — Z00.00 PREVENTATIVE HEALTH CARE: Primary | ICD-10-CM

## 2021-12-22 DIAGNOSIS — E78.5 DYSLIPIDEMIA: ICD-10-CM

## 2021-12-22 DIAGNOSIS — Z12.5 SCREENING FOR PROSTATE CANCER: ICD-10-CM

## 2021-12-22 DIAGNOSIS — E11.9 TYPE 2 DIABETES MELLITUS TREATED WITHOUT INSULIN (H): ICD-10-CM

## 2021-12-22 DIAGNOSIS — I10 BENIGN ESSENTIAL HTN: ICD-10-CM

## 2021-12-22 LAB
ALBUMIN SERPL-MCNC: 4.2 G/DL (ref 3.5–5)
ALBUMIN UR-MCNC: NEGATIVE MG/DL
ALP SERPL-CCNC: 74 U/L (ref 45–120)
ALT SERPL W P-5'-P-CCNC: 13 U/L (ref 0–45)
ANION GAP SERPL CALCULATED.3IONS-SCNC: 10 MMOL/L (ref 5–18)
APPEARANCE UR: CLEAR
AST SERPL W P-5'-P-CCNC: 23 U/L (ref 0–40)
BILIRUB SERPL-MCNC: 0.7 MG/DL (ref 0–1)
BILIRUB UR QL STRIP: NEGATIVE
BUN SERPL-MCNC: 20 MG/DL (ref 8–22)
CALCIUM SERPL-MCNC: 9.6 MG/DL (ref 8.5–10.5)
CHLORIDE BLD-SCNC: 105 MMOL/L (ref 98–107)
CHOLEST SERPL-MCNC: 133 MG/DL
CO2 SERPL-SCNC: 23 MMOL/L (ref 22–31)
COLOR UR AUTO: YELLOW
CREAT SERPL-MCNC: 1.18 MG/DL (ref 0.7–1.3)
CREAT UR-MCNC: 125 MG/DL
ERYTHROCYTE [DISTWIDTH] IN BLOOD BY AUTOMATED COUNT: 14.5 % (ref 10–15)
FASTING STATUS PATIENT QL REPORTED: YES
GFR SERPL CREATININE-BSD FRML MDRD: 68 ML/MIN/1.73M2
GLUCOSE BLD-MCNC: 84 MG/DL (ref 70–125)
GLUCOSE UR STRIP-MCNC: NEGATIVE MG/DL
HBA1C MFR BLD: 5.8 % (ref 0–5.6)
HCT VFR BLD AUTO: 39.4 % (ref 40–53)
HDLC SERPL-MCNC: 59 MG/DL
HGB BLD-MCNC: 13.1 G/DL (ref 13.3–17.7)
HGB UR QL STRIP: NEGATIVE
KETONES UR STRIP-MCNC: NEGATIVE MG/DL
LDLC SERPL CALC-MCNC: 65 MG/DL
LEUKOCYTE ESTERASE UR QL STRIP: NEGATIVE
MCH RBC QN AUTO: 27.3 PG (ref 26.5–33)
MCHC RBC AUTO-ENTMCNC: 33.2 G/DL (ref 31.5–36.5)
MCV RBC AUTO: 82 FL (ref 78–100)
MICROALBUMIN UR-MCNC: 2.65 MG/DL (ref 0–1.99)
MICROALBUMIN/CREAT UR: 21.2 MG/G CR
NITRATE UR QL: NEGATIVE
PH UR STRIP: 6 [PH] (ref 5–7)
PLATELET # BLD AUTO: 204 10E3/UL (ref 150–450)
POTASSIUM BLD-SCNC: 4.3 MMOL/L (ref 3.5–5)
PROT SERPL-MCNC: 7.4 G/DL (ref 6–8)
PSA SERPL-MCNC: 0.51 UG/L (ref 0–4.5)
RBC # BLD AUTO: 4.79 10E6/UL (ref 4.4–5.9)
SODIUM SERPL-SCNC: 138 MMOL/L (ref 136–145)
SP GR UR STRIP: 1.02 (ref 1–1.03)
TRIGL SERPL-MCNC: 43 MG/DL
UROBILINOGEN UR STRIP-ACNC: 0.2 E.U./DL
WBC # BLD AUTO: 5.4 10E3/UL (ref 4–11)

## 2021-12-22 PROCEDURE — 82043 UR ALBUMIN QUANTITATIVE: CPT | Performed by: FAMILY MEDICINE

## 2021-12-22 PROCEDURE — 80061 LIPID PANEL: CPT | Performed by: FAMILY MEDICINE

## 2021-12-22 PROCEDURE — 36415 COLL VENOUS BLD VENIPUNCTURE: CPT | Performed by: FAMILY MEDICINE

## 2021-12-22 PROCEDURE — 81003 URINALYSIS AUTO W/O SCOPE: CPT | Performed by: FAMILY MEDICINE

## 2021-12-22 PROCEDURE — 99215 OFFICE O/P EST HI 40 MIN: CPT | Performed by: FAMILY MEDICINE

## 2021-12-22 PROCEDURE — 85027 COMPLETE CBC AUTOMATED: CPT | Performed by: FAMILY MEDICINE

## 2021-12-22 PROCEDURE — 80053 COMPREHEN METABOLIC PANEL: CPT | Performed by: FAMILY MEDICINE

## 2021-12-22 PROCEDURE — 83036 HEMOGLOBIN GLYCOSYLATED A1C: CPT | Performed by: FAMILY MEDICINE

## 2021-12-22 PROCEDURE — G0103 PSA SCREENING: HCPCS | Performed by: FAMILY MEDICINE

## 2021-12-22 RX ORDER — ATORVASTATIN CALCIUM 10 MG/1
10 TABLET, FILM COATED ORAL
COMMUNITY
Start: 2021-04-28 | End: 2022-06-20

## 2021-12-22 NOTE — PROGRESS NOTES
"ORDER LUNG SCREEN  Calluses    Assessment & Plan     Preventative health care  He is doing well, taking better care of himself having lost weight (and possibly going to lose more)  - PSA, screen  - PSA, screen    Type 2 diabetes mellitus treated without insulin (H)  Well controlled, continue same; might decrease meds in the future  - Hemoglobin A1c  - Lipid panel reflex to direct LDL Non-fasting  - Hemoglobin A1c  - Lipid panel reflex to direct LDL Non-fasting  - Albumin Random Urine Quantitative with Creat Ratio  - Albumin Random Urine Quantitative with Creat Ratio    Dyslipidemia  On atorvastatin 10mg; continue same  - Lipid panel reflex to direct LDL Non-fasting  - Lipid panel reflex to direct LDL Non-fasting    Benign essential HTN  Well controlled per today's BP. Continue same med/dose but if he continues to lose weight, might consider decreasing medication  - CBC with platelets  - Comprehensive metabolic panel (BMP + Alb, Alk Phos, ALT, AST, Total. Bili, TP)  - UA Macro with Reflex to Micro and Culture - lab collect  - CBC with platelets  - Comprehensive metabolic panel (BMP + Alb, Alk Phos, ALT, AST, Total. Bili, TP)  - UA Macro with Reflex to Micro and Culture - lab collect    Screening for prostate cancer  Checking due to family history  - PSA, screen  - PSA, screen    Overgrown toenails, some with onychomychosis  I trimmed all 10 nails today, 5 of which had some thickening due to fungus.      Review of external notes as documented elsewhere in note  Ordering of each unique test  Prescription drug management  60 minutes spent on the date of the encounter doing chart review, history and exam, documentation and further activities per the note       BMI:   Estimated body mass index is 32.41 kg/m  as calculated from the following:    Height as of 5/25/21: 1.753 m (5' 9\").    Weight as of this encounter: 99.6 kg (219 lb 8 oz).   Weight management plan: Discussed healthy diet and exercise guidelines    Work on " weight loss  Regular exercise    Return in about 6 months (around 6/22/2022) for Follow up Diabetes.    Martine Ng MD  [facility] Martinez is a 66 year old who presents for the following health issues     HPI   Through the summer, blood sugars were higher  He admits they ate supper late, then mornings had higher sugars  He does not miss 4 shots per day and low sugars    BP - fine    Left leg problem with exercise; saw PT - is fine. Walks daily 3 miles or more (sometimes an additional 1 mile after supper)  No SOB    Quit smoking within 10 years    Review of Systems   Constitutional, HEENT, cardiovascular, pulmonary, gi and gu systems are negative, except as otherwise noted.      Objective    /70   Pulse 63   Temp 98.6  F (37  C) (Oral)   Resp 18   Wt 99.6 kg (219 lb 8 oz)   SpO2 96%   BMI 32.41 kg/m    Body mass index is 32.41 kg/m .  Physical Exam   GENERAL: healthy, alert and no distress  EYES: Eyes grossly normal to inspection, PERRL and conjunctivae and sclerae normal  HENT: ear canals and TM's normal, nose and mouth without ulcers or lesions  NECK: no adenopathy, no asymmetry, masses, or scars and thyroid normal to palpation  RESP: lungs clear to auscultation - no rales, rhonchi or wheezes  CV: regular rate and rhythm, normal S1 S2, no S3 or S4, no murmur, click or rub, no peripheral edema and peripheral pulses strong  ABDOMEN: soft, nontender, no hepatosplenomegaly, no masses and bowel sounds normal  MS: no gross musculoskeletal defects noted, no edema  SKIN: no suspicious lesions or rashes  NEURO: Normal strength and tone, mentation intact and speech normal  PSYCH: mentation appears normal, affect normal/bright    *With his verbal permission, I trimmed all 10 toenails which were overgrown and some of which were very thick and cracking (vertically). He tolerated this well and there were no complications.    Results for orders placed or performed in  visit on 12/22/21   Hemoglobin A1c     Status: Abnormal   Result Value Ref Range    Hemoglobin A1C 5.8 (H) 0.0 - 5.6 %   CBC with platelets     Status: Abnormal   Result Value Ref Range    WBC Count 5.4 4.0 - 11.0 10e3/uL    RBC Count 4.79 4.40 - 5.90 10e6/uL    Hemoglobin 13.1 (L) 13.3 - 17.7 g/dL    Hematocrit 39.4 (L) 40.0 - 53.0 %    MCV 82 78 - 100 fL    MCH 27.3 26.5 - 33.0 pg    MCHC 33.2 31.5 - 36.5 g/dL    RDW 14.5 10.0 - 15.0 %    Platelet Count 204 150 - 450 10e3/uL   Comprehensive metabolic panel (BMP + Alb, Alk Phos, ALT, AST, Total. Bili, TP)     Status: Normal   Result Value Ref Range    Sodium 138 136 - 145 mmol/L    Potassium 4.3 3.5 - 5.0 mmol/L    Chloride 105 98 - 107 mmol/L    Carbon Dioxide (CO2) 23 22 - 31 mmol/L    Anion Gap 10 5 - 18 mmol/L    Urea Nitrogen 20 8 - 22 mg/dL    Creatinine 1.18 0.70 - 1.30 mg/dL    Calcium 9.6 8.5 - 10.5 mg/dL    Glucose 84 70 - 125 mg/dL    Alkaline Phosphatase 74 45 - 120 U/L    AST 23 0 - 40 U/L    ALT 13 0 - 45 U/L    Protein Total 7.4 6.0 - 8.0 g/dL    Albumin 4.2 3.5 - 5.0 g/dL    Bilirubin Total 0.7 0.0 - 1.0 mg/dL    GFR Estimate 68 >60 mL/min/1.73m2   Lipid panel reflex to direct LDL Non-fasting     Status: None   Result Value Ref Range    Cholesterol 133 <=199 mg/dL    Triglycerides 43 <=149 mg/dL    Direct Measure HDL 59 >=40 mg/dL    LDL Cholesterol Calculated 65 <=129 mg/dL    Patient Fasting > 8hrs? Yes    PSA, screen     Status: Normal   Result Value Ref Range    Prostate Specific Antigen Screen 0.51 0.00 - 4.50 ug/L    Narrative    Assay Method is Abbott Prostate-Specific Antigen (PSA)  Standard-WHO 1st International (90:10)   UA Macro with Reflex to Micro and Culture - lab collect     Status: Normal    Specimen: Urine, Clean Catch   Result Value Ref Range    Color Urine Yellow Colorless, Straw, Light Yellow, Yellow    Appearance Urine Clear Clear    Glucose Urine Negative Negative mg/dL    Bilirubin Urine Negative Negative    Ketones Urine  Negative Negative mg/dL    Specific Gravity Urine 1.020 1.005 - 1.030    Blood Urine Negative Negative    pH Urine 6.0 5.0 - 7.0    Protein Albumin Urine Negative Negative mg/dL    Urobilinogen Urine 0.2 0.2, 1.0 E.U./dL    Nitrite Urine Negative Negative    Leukocyte Esterase Urine Negative Negative    Narrative    Microscopic not indicated   Albumin Random Urine Quantitative with Creat Ratio     Status: Abnormal   Result Value Ref Range    Microalbumin Urine mg/dL 2.65 (H) 0.00 - 1.99 mg/dL    Creatinine Urine mg/dL 125 mg/dL    Microalbumin Urine mg/g Cr 21.2 (H) <=19.9 mg/g Cr    Narrative    Microalbumin, Random Urine   <2.0 mg/dL . . . . . . . . Normal   3.0-30.0 mg/dL . . . . . . Microalbuminuria   >30.0 mg/dL . . . . . .  . Clinical Proteinuria     Microalbumin/Creatinine Ratio, Random Urine   <20 mg/g . . . . .. . . . Normal    mg/g . . . . . . . Microalbuminuria   >300 mg/g . . . . . . . . Clinical Proteinuria

## 2021-12-23 PROBLEM — B35.1 ONYCHOMYCOSIS: Status: ACTIVE | Noted: 2021-12-23

## 2021-12-23 RX ORDER — OXYBUTYNIN CHLORIDE 5 MG/1
TABLET, EXTENDED RELEASE ORAL
COMMUNITY
End: 2022-06-28

## 2021-12-23 RX ORDER — LISINOPRIL AND HYDROCHLOROTHIAZIDE 12.5; 2 MG/1; MG/1
TABLET ORAL
COMMUNITY
End: 2022-06-28

## 2021-12-23 RX ORDER — LANCETS
EACH MISCELLANEOUS
COMMUNITY
Start: 2021-06-28 | End: 2022-07-26

## 2021-12-23 RX ORDER — METFORMIN HCL 500 MG
TABLET, EXTENDED RELEASE 24 HR ORAL
COMMUNITY
End: 2022-06-28

## 2022-03-14 DIAGNOSIS — Z76.0 ENCOUNTER FOR MEDICATION REFILL: Primary | ICD-10-CM

## 2022-06-28 ENCOUNTER — OFFICE VISIT (OUTPATIENT)
Dept: FAMILY MEDICINE | Facility: CLINIC | Age: 67
End: 2022-06-28
Payer: COMMERCIAL

## 2022-06-28 VITALS
RESPIRATION RATE: 16 BRPM | HEART RATE: 60 BPM | BODY MASS INDEX: 31.03 KG/M2 | OXYGEN SATURATION: 95 % | HEIGHT: 69 IN | DIASTOLIC BLOOD PRESSURE: 64 MMHG | SYSTOLIC BLOOD PRESSURE: 118 MMHG | WEIGHT: 209.5 LBS | TEMPERATURE: 97.9 F

## 2022-06-28 DIAGNOSIS — Z76.0 ENCOUNTER FOR MEDICATION REFILL: ICD-10-CM

## 2022-06-28 DIAGNOSIS — Z23 NEED FOR VACCINATION FOR STREP PNEUMONIAE: ICD-10-CM

## 2022-06-28 DIAGNOSIS — Z00.00 PREVENTATIVE HEALTH CARE: ICD-10-CM

## 2022-06-28 DIAGNOSIS — I10 BENIGN ESSENTIAL HTN: Primary | ICD-10-CM

## 2022-06-28 DIAGNOSIS — E11.8 TYPE 2 DIABETES MELLITUS WITH COMPLICATION, WITHOUT LONG-TERM CURRENT USE OF INSULIN (H): ICD-10-CM

## 2022-06-28 DIAGNOSIS — Z87.891 FORMER SMOKER: ICD-10-CM

## 2022-06-28 DIAGNOSIS — R35.0 URINE FREQUENCY: ICD-10-CM

## 2022-06-28 DIAGNOSIS — L84 CALLUS OF FOOT: ICD-10-CM

## 2022-06-28 DIAGNOSIS — Z87.19 H/O SMALL BOWEL OBSTRUCTION: ICD-10-CM

## 2022-06-28 DIAGNOSIS — Z87.891 PERSONAL HISTORY OF NICOTINE DEPENDENCE: ICD-10-CM

## 2022-06-28 DIAGNOSIS — R00.2 PALPITATIONS: ICD-10-CM

## 2022-06-28 DIAGNOSIS — B35.1 ONYCHOMYCOSIS: ICD-10-CM

## 2022-06-28 PROBLEM — E66.01 CLASS 2 SEVERE OBESITY DUE TO EXCESS CALORIES WITH SERIOUS COMORBIDITY AND BODY MASS INDEX (BMI) OF 35.0 TO 35.9 IN ADULT (H): Status: RESOLVED | Noted: 2017-02-28 | Resolved: 2022-06-28

## 2022-06-28 PROBLEM — K56.609 SBO (SMALL BOWEL OBSTRUCTION) (H): Status: RESOLVED | Noted: 2017-11-01 | Resolved: 2022-06-28

## 2022-06-28 PROBLEM — E66.812 CLASS 2 SEVERE OBESITY DUE TO EXCESS CALORIES WITH SERIOUS COMORBIDITY AND BODY MASS INDEX (BMI) OF 35.0 TO 35.9 IN ADULT (H): Status: RESOLVED | Noted: 2017-02-28 | Resolved: 2022-06-28

## 2022-06-28 LAB
CHOLEST SERPL-MCNC: 120 MG/DL
FASTING STATUS PATIENT QL REPORTED: YES
HBA1C MFR BLD: 5.9 % (ref 0–5.6)
HDLC SERPL-MCNC: 60 MG/DL
LDLC SERPL CALC-MCNC: 53 MG/DL
TRIGL SERPL-MCNC: 36 MG/DL

## 2022-06-28 PROCEDURE — 80061 LIPID PANEL: CPT | Performed by: FAMILY MEDICINE

## 2022-06-28 PROCEDURE — 83036 HEMOGLOBIN GLYCOSYLATED A1C: CPT | Performed by: FAMILY MEDICINE

## 2022-06-28 PROCEDURE — 36415 COLL VENOUS BLD VENIPUNCTURE: CPT | Performed by: FAMILY MEDICINE

## 2022-06-28 PROCEDURE — 90732 PPSV23 VACC 2 YRS+ SUBQ/IM: CPT | Performed by: FAMILY MEDICINE

## 2022-06-28 PROCEDURE — 99214 OFFICE O/P EST MOD 30 MIN: CPT | Mod: 25 | Performed by: FAMILY MEDICINE

## 2022-06-28 PROCEDURE — 93005 ELECTROCARDIOGRAM TRACING: CPT | Performed by: FAMILY MEDICINE

## 2022-06-28 PROCEDURE — 99397 PER PM REEVAL EST PAT 65+ YR: CPT | Mod: 25 | Performed by: FAMILY MEDICINE

## 2022-06-28 PROCEDURE — 90471 IMMUNIZATION ADMIN: CPT | Performed by: FAMILY MEDICINE

## 2022-06-28 PROCEDURE — 93010 ELECTROCARDIOGRAM REPORT: CPT | Performed by: INTERNAL MEDICINE

## 2022-06-28 RX ORDER — ATORVASTATIN CALCIUM 10 MG/1
TABLET, FILM COATED ORAL
Qty: 90 TABLET | Refills: 4 | Status: SHIPPED | OUTPATIENT
Start: 2022-06-28 | End: 2023-06-06

## 2022-06-28 ASSESSMENT — ENCOUNTER SYMPTOMS
DYSURIA: 0
HEARTBURN: 0
PALPITATIONS: 0
SORE THROAT: 0
FEVER: 0
COUGH: 0
HEADACHES: 0
HEMATOCHEZIA: 0
HEMATURIA: 0
CONSTIPATION: 0
DIZZINESS: 0
SHORTNESS OF BREATH: 0
EYE PAIN: 0
FREQUENCY: 0
CHILLS: 0
ARTHRALGIAS: 1
MYALGIAS: 1
NERVOUS/ANXIOUS: 0
DIARRHEA: 0
PARESTHESIAS: 0
ABDOMINAL PAIN: 0
WEAKNESS: 0

## 2022-06-28 ASSESSMENT — ACTIVITIES OF DAILY LIVING (ADL): CURRENT_FUNCTION: NO ASSISTANCE NEEDED

## 2022-06-28 NOTE — PROGRESS NOTES
"SUBJECTIVE:   CC: Juan Fleming is an 67 year old male who presents for preventative health visit.     Patient has been advised of split billing requirements and indicates understanding: Yes  Healthy Habits:     In general, how would you rate your overall health?  Good    Frequency of exercise:  None    Do you usually eat at least 4 servings of fruit and vegetables a day, include whole grains    & fiber and avoid regularly eating high fat or \"junk\" foods?  Yes    Taking medications regularly:  Yes    Ability to successfully perform activities of daily living:  No assistance needed    Home Safety:  No safety concerns identified    Hearing Impairment:  Difficulty understanding soft or whispered speech    In the past 6 months, have you been bothered by leaking of urine?  No    In general, how would you rate your overall mental or emotional health?  Excellent      PHQ-2 Total Score: 0    Additional concerns today:  No    He is learning from his blood sugar test    varicous veins - on inside of left leg - is it ok? I don't care about cosmetics, I mean is it OK medically    Doing weights - [employer] twice per week. He can tell it helps him, especially when he wants to sail and get around in his boat    Lung cancer screening - no one called. He would not want prolonged life if it was all super difficult. Father lived to be 94 but with dementia - last 10 years were misery.    Today's PHQ-2 Score:   PHQ-2 ( 1999 [employer]) 6/28/2022   Q1: Little interest or pleasure in doing things 0   Q2: Feeling down, depressed or hopeless 0   PHQ-2 Score 0   Q1: Little interest or pleasure in doing things Not at all   Q2: Feeling down, depressed or hopeless Not at all   PHQ-2 Score 0       Abuse: Current or Past(Physical, Sexual or Emotional)- No  Do you feel safe in your environment? Yes    Have you ever done Advance Care Planning? (For example, a Health Directive, POLST, or a discussion with a medical provider or your loved ones about " your wishes): Yes, advance care planning is on file.    Social History     Tobacco Use     Smoking status: Former Smoker     Packs/day: 0.50     Quit date: 2015     Years since quittin.4     Smokeless tobacco: Never Used     Tobacco comment: no passive exposure   Substance Use Topics     Alcohol use: No     Comment: Alcoholic Drinks/day: quit drinkning        Alcohol Use 2022   Prescreen: >3 drinks/day or >7 drinks/week? Not Applicable     Last PSA:   Prostate Specific Antigen Screen   Date Value Ref Range Status   2021 0.51 0.00 - 4.50 ug/L Final       Reviewed orders with patient. Reviewed health maintenance and updated orders accordingly - Yes  Lab work is in process  Labs reviewed in EPIC  BP Readings from Last 3 Encounters:   22 118/64   21 126/70   21 126/74    Wt Readings from Last 3 Encounters:   22 95 kg (209 lb 8 oz)   21 99.6 kg (219 lb 8 oz)   21 100.6 kg (221 lb 12 oz)                  Patient Active Problem List   Diagnosis     Benign prostatic hyperplasia     History of pulmonary embolism     Acrochordon     Anxiety     Alcohol abuse     Type 2 diabetes mellitus with complication, without long-term current use of insulin (H)     Hyperosmolarity due to secondary diabetes mellitus (H)     SINDI (acute kidney injury) (H)     Hyponatremia     Cellulitis of right leg     Type 2 diabetes mellitus treated without insulin (H)     Dyslipidemia     Benign essential HTN     Bunion, left     Heart murmur     Onychomycosis     H/O small bowel obstruction     Past Surgical History:   Procedure Laterality Date     ABDOMEN SURGERY      Colen resection     HC REPAIR INCISIONAL HERNIA,REDUCIBLE      Description: Ventral Hernia Repair;  Recorded: 2008;     HC REPAIR UMBILICAL ERAN,<6Y/O,REDUC      Description: Umbilical Hernia Repair;  Recorded: 2008;     HERNIORRHAPHY VENTRAL N/A 11/3/2017    Procedure: REPAIR, HERNIA, VENTRAL, OPEN;  Surgeon: Maxine  LIANNE Rivera MD;  Location: [facility] [place];  Service:      [facility] APPENDECTOMY      Description: Appendectomy;  Recorded: 2008;     [facility] EXPLORATORY OF ABDOMEN N/A 11/3/2017    Procedure: LAPAROTOMY, LYSIS OF ADHESIONS, MESH EXPLANTATION VENTRAL HERNIA REPAIR WITH MESH ;  Surgeon: Maxine Rivera MD;  Location: [facility] [place];  Service: General       Social History     Tobacco Use     Smoking status: Former Smoker     Packs/day: 0.50     Quit date: 2015     Years since quittin.4     Smokeless tobacco: Never Used     Tobacco comment: no passive exposure   Substance Use Topics     Alcohol use: No     Comment: Alcoholic Drinks/day: quit drinkning      Family History   Problem Relation Age of Onset     Mental Illness Mother         depression and drugs/alcohol     Cerebrovascular Disease Father 94.00        had a PE     CABG Father      Pulmonary Embolism Father      Osteoarthritis Father         hip replaced     Other - See Comments Maternal Aunt         cancer was found, then committed suicide     Dementia Maternal Aunt          Current Outpatient Medications   Medication Sig Dispense Refill     aspirin (ASA) 81 MG EC tablet Take 81 mg by mouth       atorvastatin (LIPITOR) 10 MG tablet Take 1 tablet (10 mg total) by mouth daily. 90 tablet 4     blood glucose (CONTOUR NEXT TEST) test strip Use 1 each to test blood glucose  As Directed Daily at 8:00 am 200 strip 3     lisinopril (ZESTRIL) 10 MG tablet Take 1 tablet (10 mg total) by mouth daily. 90 tablet 1     Microlet Lancets MISC Use 1 each to test blood glucose As Directed Daily at 8:00 am       No Known Allergies  Recent Labs   Lab Test 22  0718 21  0826 21  0707 20  0655 20  1315 19  0808 19  1355 10/02/18  0840   A1C 5.9* 5.8* 5.9* 6.0*   < > 6.2*   < > 5.9   LDL 53 65  --  75  --   --    < > 74   HDL 60 59  --  59  --   --    < > 51   TRIG 36 43  --  45  --   --    < > 53   ALT  --  13  --  15  --   --    --  16   CR  --  1.18  --  1.16  --  1.11  --  1.06   GFRESTIMATED  --  68  --  >60  --  >60  --  >60   GFRESTBLACK  --   --   --  >60  --  >60  --  >60   POTASSIUM  --  4.3  --  4.8  --  5.0  --  5.1*    < > = values in this interval not displayed.        Reviewed and updated as needed this visit by clinical staff    Allergies  Meds              Reviewed and updated as needed this visit by Provider                   Past Medical History:   Diagnosis Date     Alcohol abuse      Colorectal polyps 02/2020    none high grade; repeat colonoscopy in 3 years (2/2023)     Diabetes mellitus (H)      Hernia 09/07/2017    Ventral     Hypertension      Influenza      PE (pulmonary embolism)      Perforated bowel (H)       Past Surgical History:   Procedure Laterality Date     ABDOMEN SURGERY      Colen resection     HC REPAIR INCISIONAL HERNIA,REDUCIBLE      Description: Ventral Hernia Repair;  Recorded: 07/30/2008;     HC REPAIR UMBILICAL ERAN,<6Y/O,REDUC      Description: Umbilical Hernia Repair;  Recorded: 07/30/2008;     HERNIORRHAPHY VENTRAL N/A 11/3/2017    Procedure: REPAIR, HERNIA, VENTRAL, OPEN;  Surgeon: Maxine Rivera MD;  Location: [facility] [place];  Service:      [facility] APPENDECTOMY      Description: Appendectomy;  Recorded: 07/30/2008;     [facility] EXPLORATORY OF ABDOMEN N/A 11/3/2017    Procedure: LAPAROTOMY, LYSIS OF ADHESIONS, MESH EXPLANTATION VENTRAL HERNIA REPAIR WITH MESH ;  Surgeon: Maxine Rivera MD;  Location: [facility] [place];  Service: General       Review of Systems   Constitutional: Negative for chills and fever.   HENT: Negative for congestion, ear pain, hearing loss and sore throat.    Eyes: Negative for pain and visual disturbance.   Respiratory: Negative for cough and shortness of breath.    Cardiovascular: Negative for chest pain and palpitations.   Gastrointestinal: Negative for abdominal pain, constipation, diarrhea, heartburn and hematochezia.   Genitourinary: Negative for dysuria,  frequency, genital sores, hematuria, impotence and urgency.   Musculoskeletal: Positive for arthralgias and myalgias.   Skin: Negative for rash.   Neurological: Negative for dizziness, weakness, headaches and paresthesias.   Psychiatric/Behavioral: The patient is not nervous/anxious.      CONSTITUTIONAL: NEGATIVE for fever, chills, change in weight  INTEGUMENTARY/SKIN: NEGATIVE for worrisome rashes, moles or lesions, question about skin over varicosities  EYES: NEGATIVE for vision changes or irritation  ENT: NEGATIVE for ear, mouth and throat problems  RESP: NEGATIVE for significant cough or SOB  CV: NEGATIVE for chest pain, palpitations or peripheral edema  GI: NEGATIVE for nausea, abdominal pain, heartburn, or change in bowel habits   male: negative for dysuria, hematuria, decreased urinary stream, erectile dysfunction, urethral discharge  MUSCULOSKELETAL: NEGATIVE for significant arthralgias or myalgia  NEURO: NEGATIVE for weakness, dizziness or paresthesias  PSYCHIATRIC: NEGATIVE for changes in mood or affect    OBJECTIVE:   There were no vitals taken for this visit.    Physical Exam  GENERAL: healthy, alert and no distress  EYES: Eyes grossly normal to inspection, PERRL and conjunctivae and sclerae normal  HENT: ear canals and TM's normal, nose and mouth without ulcers or lesions  NECK: no adenopathy, no asymmetry, masses, or scars and thyroid normal to palpation  RESP: lungs clear to auscultation - no rales, rhonchi or wheezes  CV: regular rate and rhythm, normal S1 S2, no S3 or S4, no murmur, click or rub, no peripheral edema and peripheral pulses strong  ABDOMEN: soft, nontender, no hepatosplenomegaly, no masses and bowel sounds normal  MS: no gross musculoskeletal defects noted, no edema  SKIN: no suspicious lesions or rashes nesha over his varicosities on his left leg; the varicosities are large but are soft with no skin breakdown  NEURO: Normal strength and tone, mentation intact and speech normal  PSYCH:  mentation appears normal, affect normal/bright  Diabetic foot exam: normal DP and PT pulses, normal sensory exam, trophic changes large thick calluses bilaterally on the 5th toe end of the ball of the foot and onychomycosis  At his request, I shaved down the calluses so that the skin was flexible; the calluses on the medial side of the great toes I also shaved down. There was one tiny speck of bleeding on the left great toe which I put a bandaid on.    Diagnostic Test Results:  Labs reviewed in Epic  Results for orders placed or performed in visit on 06/28/22 (from the past 24 hour(s))   Hemoglobin A1c   Result Value Ref Range    Hemoglobin A1C 5.9 (H) 0.0 - 5.6 %   Lipid panel reflex to direct LDL Non-fasting   Result Value Ref Range    Cholesterol 120 <=199 mg/dL    Triglycerides 36 <=149 mg/dL    Direct Measure HDL 60 >=40 mg/dL    LDL Cholesterol Calculated 53 <=129 mg/dL    Patient Fasting > 8hrs? Yes    EKG 12-lead, tracing only   Result Value Ref Range    Systolic Blood Pressure  mmHg    Diastolic Blood Pressure  mmHg    Ventricular Rate 49 BPM    Atrial Rate 49 BPM    CO Interval 176 ms    QRS Duration 110 ms     ms    QTc 413 ms    P Axis 47 degrees    R AXIS 29 degrees    T Axis 41 degrees    Interpretation ECG       Sinus bradycardia  Otherwise normal ECG  When compared with ECG of 02-NOV-2017 00:36,  No significant change was found         ASSESSMENT/PLAN:   (I10) Benign essential HTN  (primary encounter diagnosis)  Comment: controlled, continue same meds; check EKG  Plan: Adult Eye Referral, EKG 12-lead, tracing only    (L84) Callus of foot  Comment: shaved 4 calluses down for his comfort as he walks    (R35.0) Urine frequency  Comment: controlled with diabetes control    (B35.1) Onychomycosis  Comment: noted, no need to trim nails today    (Z00.00) Preventative health care  Comment: reason for today's visit, reassurance that his varicose veins are OK to leave alone at this point    (R00.2)  "Palpitations  Comment: reason #2 for EKG  Plan: EKG 12-lead, tracing only        normal    (Z87.891) Former smoker  Comment: reason for pneumococcal vaccine; he agrees to the lung cancer screening CT only if it means he could get treatment and live well. He is not interested in prolonging his life with chemo while being in misery    (Z87.891) Personal history of nicotine dependence  Comment: is currently a non-smoker but is interested in the lung cancer screening CT  Plan: order CT    (E11.8) Type 2 diabetes mellitus with complication, without long-term current use of insulin (H)  Comment: well-managed    (Z76.0) Encounter for medication refill  Comment: refills given  Plan: blood glucose (CONTOUR NEXT TEST) test strip,         atorvastatin (LIPITOR) 10 MG tablet    (Z23) Need for vaccination for Strep pneumoniae  Comment: given  Plan: PPSV23, IM/SUBQ (2+ YRS) - [id number]    (Z87.19) H/O small bowel obstruction  Comment: noted      COUNSELING:   Reviewed preventive health counseling, as reflected in patient instructions       Regular exercise       Healthy diet/nutrition       Vision screening       Osteoporosis prevention/bone health       One time pneumovax for smokers       Advance Care Planning    Estimated body mass index is 32.41 kg/m  as calculated from the following:    Height as of 5/25/21: 1.753 m (5' 9\").    Weight as of 12/22/21: 99.6 kg (219 lb 8 oz).     Weight management plan: Discussed healthy diet and exercise guidelines    He reports that he quit smoking about 7 years ago. He smoked 0.50 packs per day. He has never used smokeless tobacco.      Counseling Resources:  ATP IV Guidelines  Pooled Cohorts Equation Calculator  FRAX Risk Assessment  ICSI Preventive Guidelines  Dietary Guidelines for Americans, 2010  USDA's MyPlate  ASA Prophylaxis  Lung CA Screening    Martine Ng MD  [facility]  "

## 2022-07-06 LAB
ATRIAL RATE - MUSE: 49 BPM
DIASTOLIC BLOOD PRESSURE - MUSE: NORMAL MMHG
INTERPRETATION ECG - MUSE: NORMAL
P AXIS - MUSE: 47 DEGREES
PR INTERVAL - MUSE: 176 MS
QRS DURATION - MUSE: 110 MS
QT - MUSE: 458 MS
QTC - MUSE: 413 MS
R AXIS - MUSE: 29 DEGREES
SYSTOLIC BLOOD PRESSURE - MUSE: NORMAL MMHG
T AXIS - MUSE: 41 DEGREES
VENTRICULAR RATE- MUSE: 49 BPM

## 2022-07-26 ENCOUNTER — DOCUMENTATION ONLY (OUTPATIENT)
Dept: OTHER | Facility: CLINIC | Age: 67
End: 2022-07-26

## 2022-08-05 ENCOUNTER — OFFICE VISIT (OUTPATIENT)
Dept: FAMILY MEDICINE | Facility: CLINIC | Age: 67
End: 2022-08-05
Payer: COMMERCIAL

## 2022-08-05 VITALS
SYSTOLIC BLOOD PRESSURE: 122 MMHG | BODY MASS INDEX: 31.32 KG/M2 | TEMPERATURE: 98.5 F | HEART RATE: 62 BPM | WEIGHT: 213.9 LBS | DIASTOLIC BLOOD PRESSURE: 64 MMHG

## 2022-08-05 DIAGNOSIS — L03.012 PARONYCHIA OF LEFT INDEX FINGER: Primary | ICD-10-CM

## 2022-08-05 DIAGNOSIS — E11.9 TYPE 2 DIABETES MELLITUS TREATED WITHOUT INSULIN (H): ICD-10-CM

## 2022-08-05 PROCEDURE — 99214 OFFICE O/P EST MOD 30 MIN: CPT | Performed by: FAMILY MEDICINE

## 2022-08-05 RX ORDER — DOXYCYCLINE HYCLATE 100 MG
100 TABLET ORAL 2 TIMES DAILY
Qty: 20 TABLET | Refills: 0 | Status: SHIPPED | OUTPATIENT
Start: 2022-08-05 | End: 2022-08-16

## 2022-08-05 RX ORDER — MUPIROCIN 20 MG/G
OINTMENT TOPICAL 3 TIMES DAILY
Qty: 22 G | Refills: 0 | Status: SHIPPED | OUTPATIENT
Start: 2022-08-05 | End: 2022-08-16

## 2022-08-05 NOTE — PROGRESS NOTES
ASSESSMENT/PLAN:      ICD-10-CM    1. Paronychia of left index finger  L03.012 doxycycline hyclate (VIBRA-TABS) 100 MG tablet     mupirocin (BACTROBAN) 2 % external ointment   2. Type 2 diabetes mellitus treated without insulin (H)  E11.9     last a1c 5.9 controlled with diet and exercise        Patient Instructions   Start doxycycline 2 times a day for 10 days always take with food to prevent nausea vomiting even the pharmacist tells you not to take it with food  for small infected area on the skin    Start bactroban-mupircion to infected areas    Soak finger in warm water with antibacterial soap for 15-30 minutes in am and pm until area of swelling improves    If increase swelling of finger, redness or streaking of redness down finger/into hand return to clinic or walk in care/urgent care     Follow up as needed or if your symptoms worsen in any way.     Follow up with your primary care provider or clinic in about 1 week if your symptoms do not improve     Landy El MD               Radha Martinez is a 67 year old, presenting for the following health issues:  swollen finger (Left index finger swollen, painful, and to the touch, started as a hang nail,  X2 weeks.  )      HPI     Rash  Onset/Duration: 2 weeks ago   Description  Location: left index finger around nail, increase swelling along base of nail and increased swelling in last 2-3 days, tender, uncertain if splinter vs hang nail   Intensity:  moderate  Progression of Symptoms:  worsening  Accompanying signs and symptoms:   Fever: No  Body aches or joint pain: No    History:           Previous episodes of similar : None  Precipitating or alleviating factors: none   Therapies tried and outcome:  Using tylenol and ibuprofen prn for pain, has not used these meds for finger pain     Uses all of his fingers using lancet to check blood sugars daily using ? Second, third fourth and fifth fingers to check blood sugars   CMA instructed patient to use third and  fourth with lancet perpendicular to whirls on finger pad of DIP, patient not aware only         Review of Systems   Constitutional, HEENT, cardiovascular, pulmonary, GI, , musculoskeletal, neuro, skin, endocrine and psych systems are negative, except as otherwise noted.      Objective    /64 (BP Location: Left arm, Patient Position: Sitting, Cuff Size: Adult Regular)   Pulse 62   Temp 98.5  F (36.9  C) (Oral)   Wt 97 kg (213 lb 14.4 oz)   BMI 31.32 kg/m    Body mass index is 31.32 kg/m .  Physical Exam   GENERAL:  alert and no distress  MS: no gross musculoskeletal defects noted, no edema  SKIN: Left index finger-mild swelling and erythema along the base of the nail,  Pinpoint small minimal fluctuant area along the ulnar side of the nail, swelling of the finger no redness or streaking minimal tenderness to palpation, no foreign body/splinter noted on exam   NEURO: Normal strength and tone, mentation intact and speech normal gait     Diagnostic Test Results:  Labs reviewed in Epic  No results found for any visits on 08/05/22.                .  ..

## 2022-08-05 NOTE — PATIENT INSTRUCTIONS
Start doxycycline 2 times a day for 10 days always take with food to prevent nausea vomiting even the pharmacist tells you not to take it with food  for small infected area on the skin    Start bactroban-mupircion to infected areas    Soak finger in warm water with antibacterial soap for 15-30 minutes in am and pm until area of swelling improves    If increase swelling of finger, redness or streaking of redness down finger/into hand return to clinic or walk in care/urgent care     Follow up as needed or if your symptoms worsen in any way.     Follow up with your primary care provider or clinic in about 1 week if your symptoms do not improve     Landy El MD

## 2022-08-09 ENCOUNTER — OFFICE VISIT (OUTPATIENT)
Dept: FAMILY MEDICINE | Facility: CLINIC | Age: 67
End: 2022-08-09
Payer: COMMERCIAL

## 2022-08-09 VITALS
TEMPERATURE: 98.1 F | WEIGHT: 209.9 LBS | DIASTOLIC BLOOD PRESSURE: 70 MMHG | HEART RATE: 63 BPM | BODY MASS INDEX: 30.74 KG/M2 | OXYGEN SATURATION: 97 % | SYSTOLIC BLOOD PRESSURE: 108 MMHG

## 2022-08-09 DIAGNOSIS — L60.8 TOENAIL DEFORMITY: ICD-10-CM

## 2022-08-09 DIAGNOSIS — L03.019 PARONYCHIA OF FINGER, UNSPECIFIED LATERALITY: Primary | ICD-10-CM

## 2022-08-09 PROCEDURE — 87186 SC STD MICRODIL/AGAR DIL: CPT | Performed by: NURSE PRACTITIONER

## 2022-08-09 PROCEDURE — 87070 CULTURE OTHR SPECIMN AEROBIC: CPT | Performed by: NURSE PRACTITIONER

## 2022-08-09 PROCEDURE — 87205 SMEAR GRAM STAIN: CPT | Performed by: NURSE PRACTITIONER

## 2022-08-09 PROCEDURE — 99213 OFFICE O/P EST LOW 20 MIN: CPT | Mod: 25 | Performed by: NURSE PRACTITIONER

## 2022-08-09 PROCEDURE — 87077 CULTURE AEROBIC IDENTIFY: CPT | Performed by: NURSE PRACTITIONER

## 2022-08-09 PROCEDURE — 10060 I&D ABSCESS SIMPLE/SINGLE: CPT | Mod: F1 | Performed by: NURSE PRACTITIONER

## 2022-08-09 RX ORDER — CLINDAMYCIN HCL 300 MG
300 CAPSULE ORAL 3 TIMES DAILY
Qty: 30 CAPSULE | Refills: 0 | Status: SHIPPED | OUTPATIENT
Start: 2022-08-09 | End: 2022-08-16

## 2022-08-09 ASSESSMENT — PAIN SCALES - GENERAL: PAINLEVEL: EXTREME PAIN (8)

## 2022-08-09 NOTE — PROGRESS NOTES
Assessment & Plan     ICD-10-CM    1. Paronychia of finger, unspecified laterality  L03.019 clindamycin (CLEOCIN) 300 MG capsule     Abscess Aerobic Bacterial Culture Routine with Gram Stain     DRAINAGE FINGER ABSCESS, SIMPLE   2. Toenail deformity  L60.8      Reviewed with the patient that we should try an incision and drainage.  He was in agreement.  After the area was cleansed with alcohol, a single stab incision was made using a #11 blade.  Well-tolerated.  Copious amounts of purulent drainage removed.  This was cultured and sent to lab.  Reviewed with the patient postprocedure cares.  Continue warm soaks at home.  Switch out doxycycline for clindamycin, but adjust based on culture.  In regards to the toenail, question if there was either fungus or possible missed trauma.  No red flags on exam.  Continue to monitor.  If finger worsening, get checked out at an urgent care.    Reviewed family medicine note x1    Subjective     HPI     Paronychia  Patient presented on 8/5/2022 with concerns of a paronychia on the left index finger.  No palpable fluctuance so nothing was drained at that time the patient was started on doxycycline.  Since then, symptoms have worsened with increasing swelling, pain, and redness now starting to travel down the finger.  No neurological deficit.  Atraumatic.  Afebrile without chills.    Nail discoloration  Right foot second toe.  Nail appears discolored.  Appears looser.  Cannot remember a specific trauma.  Makes mention of wondering if there could be a fungus.      Review of Systems - negative except for what's listed in the HPI      Objective    /70 (BP Location: Right arm, Patient Position: Sitting, Cuff Size: Adult Regular)   Pulse 63   Temp 98.1  F (36.7  C)   Wt 95.2 kg (209 lb 14.4 oz)   SpO2 97%   BMI 30.74 kg/m    Physical Exam   General appearance - alert, well appearing, and in no distress  Mental status - alert, oriented to person, place, and time  Lymphatics - no  palpable lymphadenopathy  Abdomen - soft, nontender, nondistended, no masses or organomegaly  Neurological -grossly intact  Musculoskeletal -swelling, tenderness to notch, and diminished range of motion secondary to pain at the site of the infection.  Extremities - peripheral pulses normal, no peripheral edema, capillary refill of all fingers <3 seconds.  Skin -surrounding erythema near the area of purulent fluid collection.  Along the affected nail on the right foot second toe, the nail itself has peeled off during today's visit.  I do see new nail growth behind it.  No discoloration noted.  Surrounding skin normal.  Does appear slightly misshapen, but overall okay.    Guanaco Vargas, CNP    This note has been dictated using voice recognition software. Any grammatical or context distortions are unintentional and inherent to the software.      .  ..

## 2022-08-09 NOTE — PATIENT INSTRUCTIONS
The culture of that wound will come back in the next few days.    In the meantime, you can continue to soak.  If this opens up on its own, it is okay to let it drain.    Lets go ahead and switch out the doxycycline for clindamycin.  This is taken 3 times a day with food.  I sent this to your pharmacy.    I guess the toenail came off today!  Just keep an eye on it.

## 2022-08-12 ENCOUNTER — TELEPHONE (OUTPATIENT)
Dept: FAMILY MEDICINE | Facility: CLINIC | Age: 67
End: 2022-08-12

## 2022-08-12 ENCOUNTER — MYC MEDICAL ADVICE (OUTPATIENT)
Dept: FAMILY MEDICINE | Facility: CLINIC | Age: 67
End: 2022-08-12

## 2022-08-12 DIAGNOSIS — L03.019 PARONYCHIA OF FINGER, UNSPECIFIED LATERALITY: Primary | ICD-10-CM

## 2022-08-12 LAB
BACTERIA ABSC ANAEROBE+AEROBE CULT: ABNORMAL
BACTERIA ABSC ANAEROBE+AEROBE CULT: ABNORMAL
GRAM STAIN RESULT: ABNORMAL

## 2022-08-12 NOTE — TELEPHONE ENCOUNTER
----- Message from Guanaco Vargas NP sent at 8/12/2022 12:48 PM CDT -----  Please call patient.  Culture grew out E. coli.  May need to switch up his antibiotic.  See how his finger is doing.  If not really getting better, I may switch him to levofloxacin.    Guanaco Vargas NP

## 2022-08-12 NOTE — TELEPHONE ENCOUNTER
Called patient to discuss. He says that he believe his finger is improving. He is takinghis antibiotic. Biggest symptom is still redness. He denied any pain, swelling, warmth, odor or drainage to finger. He said if he did press on it before, it was extremely painful, and now there is no pain. He will upload a picture to [employer] later today.    Jenae Cr RN on 8/12/2022 at 12:52 PM

## 2022-08-13 ENCOUNTER — NURSE TRIAGE (OUTPATIENT)
Dept: NURSING | Facility: CLINIC | Age: 67
End: 2022-08-13

## 2022-08-13 NOTE — TELEPHONE ENCOUNTER
Patient calling - says he [facility] for a swollen finger.  At that time he was prescribed doxycycline.  He was on that for 4 days and the finger continued to get worse and swelling grew.  He saw Guanaco Vargas NP on 8/9/2022 in clinic.  They drained pus from the finger and sent a sample to the lab.  Was prescribed clindamycin and advised to soak finger.  He was called yesterday and was told the infection was e-coli and not covered by clindamycin and that they may need to switch the antibiotic if it is not improving.      Results note from provider:  Guanaco Vargas NP   8/12/2022 12:48 PM CDT         Please call patient.  Culture grew out E. coli.  May need to switch up his antibiotic.  See how his finger is doing.  If not really getting better, I may switch him to levofloxacin.     Guanaco Vargas NP     Patient says it is getting better.  Says it was five times as big as it is now.  No more pus draining from it when he soaks it.  It is sore when he presses on it but not like before. Says he uploaded a picture of the finger yesterday evening but never hear back.  Says he stopped clindamycin yesterday.  Is asking if he needs a different antibiotic.    10:34 am page placed to on-call provider, Dr. Hina Villegas, via [facility].  Per Dr. Villegas- it looks like the infection should be susceptible to the clindamycin and especially since the finger is improving, he should continue with the clindamycin as prescribed.  He should finish the antibiotic and follow up with his primary care provider to ensure the infection is clear.    10:47 am called patient back and relayed recommendations from Dr. Villegas: continue clindamycin as prescribed and schedule a follow up appointment with provider.  Transferred to scheduling.  Provider Recommendation Follow Up:   Reached patient/caregiver. Informed of provider's recommendations. Patient verbalized understanding and agrees with the plan.     Mis  Jacinto RN  Triage Nurse Advisor    Reason for Disposition    [1] Caller has URGENT medicine question about med that PCP or specialist prescribed AND [2] triager unable to answer question    Protocols used: MEDICATION QUESTION CALL-A-AH

## 2022-08-14 ENCOUNTER — HEALTH MAINTENANCE LETTER (OUTPATIENT)
Age: 67
End: 2022-08-14

## 2022-08-14 RX ORDER — LEVOFLOXACIN 500 MG/1
500 TABLET, FILM COATED ORAL DAILY
Qty: 10 TABLET | Refills: 0 | Status: SHIPPED | OUTPATIENT
Start: 2022-08-14 | End: 2022-12-27

## 2022-08-14 NOTE — TELEPHONE ENCOUNTER
See [employer] encounter for additional information.    Guanaco Vargas, CNP

## 2022-08-15 ENCOUNTER — TRANSFERRED RECORDS (OUTPATIENT)
Dept: HEALTH INFORMATION MANAGEMENT | Facility: CLINIC | Age: 67
End: 2022-08-15

## 2022-08-15 LAB — RETINOPATHY: NEGATIVE

## 2022-08-16 ENCOUNTER — OFFICE VISIT (OUTPATIENT)
Dept: FAMILY MEDICINE | Facility: CLINIC | Age: 67
End: 2022-08-16
Payer: COMMERCIAL

## 2022-08-16 VITALS
TEMPERATURE: 97.7 F | WEIGHT: 210 LBS | RESPIRATION RATE: 16 BRPM | DIASTOLIC BLOOD PRESSURE: 64 MMHG | BODY MASS INDEX: 30.06 KG/M2 | SYSTOLIC BLOOD PRESSURE: 128 MMHG | OXYGEN SATURATION: 100 % | HEIGHT: 70 IN | HEART RATE: 50 BPM

## 2022-08-16 DIAGNOSIS — L08.9 FINGER INFECTION: Primary | ICD-10-CM

## 2022-08-16 PROCEDURE — 99213 OFFICE O/P EST LOW 20 MIN: CPT | Performed by: NURSE PRACTITIONER

## 2022-08-16 ASSESSMENT — PAIN SCALES - GENERAL: PAINLEVEL: MILD PAIN (3)

## 2022-08-16 NOTE — PROGRESS NOTES
Assessment & Plan     Finger infection    Patient was instructed to finish his entire prescription of the Levaquin, he does have about 8 days left.  He may continue with antibacterial soaks and Tylenol as needed for any discomfort.  We did discuss signs and symptoms of C. difficile that he should watch out for since he has been on 4 different antibiotics in the last 4 to 6 weeks due to his dental work and with his recent infection of the finger.  He is not currently having any diarrhea symptoms or abdominal discomfort, he does continue to eat yogurt daily   All questions were answered today, patient agrees with plan of care      Return if symptoms worsen or fail to improve.    Luisa Garcia NP  [facility] CARLOS Martinez is a 67 year old, presenting for the following health issues:  Finger (Tx for inf in Left index finger on 8/5 and 8/9- Pt notes improvement)      History of Present Illness       Reason for visit:  Ieft index finger    He eats 4 or more servings of fruits and vegetables daily.He consumes 0 sweetened beverage(s) daily.He exercises with enough effort to increase his heart rate 30 to 60 minutes per day.  He exercises with enough effort to increase his heart rate 5 days per week.   He is taking medications regularly.      Single Inflamed Joint/Infection  Onset/Duration: treated for finger infection x 2 weeks. Finger was lanced and drained, culture showed E.coli infection. He is on his third round of oral antibiotics since the infection started, most recent Rx was started 2 days ago: Levaquin. He was also on Amoxicillin for a dental issues, he finished that Rx 3 to 4 weeks before the finger infection started.   Description:   Location: fingers - left index  Joint Swelling: YES- mild  Redness: YES- mild  Pain: YES- mild  Intensity: 2/10  Progression of Symptoms: improving and constant  Accompanying Signs & Symptoms:  Fevers: No  History:   Trauma to the area: No, he  "did have a hang nail which ended up forming into a large pus pocket which was drained in clinic an cultured  Previous history of gout: No  Recent illness: No  Alcohol use: No  Diuretic use: No  Precipitating or alleviating factors: finger soaks with antibacterial soap  Therapies tried and outcome: anti-inflammatories, helpful        Review of Systems   CONSTITUTIONAL: NEGATIVE for fever, chills, change in weight  ENT/MOUTH: NEGATIVE for ear, mouth and throat problems  RESP: NEGATIVE for significant cough or SOB  CV: NEGATIVE for chest pain, palpitations or peripheral edema      Objective    /64 (BP Location: Right arm, Patient Position: Sitting, Cuff Size: Adult Regular)   Pulse 50   Temp 97.7  F (36.5  C) (Oral)   Resp 16   Ht 1.772 m (5' 9.75\")   Wt 95.3 kg (210 lb)   SpO2 100%   BMI 30.35 kg/m    Body mass index is 30.35 kg/m .  Physical Exam   GENERAL: healthy, alert and no distress  RESP: lungs clear to auscultation - no rales, rhonchi or wheezes  CV: regular rate and rhythm, normal S1 S2, no S3 or S4, no murmur, click or rub  ABDOMEN: soft, nontender, no hepatosplenomegaly, no masses and bowel sounds normal  MS: left index finger with mild swelling and redness, skin around the nail bed continues to peel, no active bleeding or drainage noted today, is able to flex and extend the finger                    .  ..  "

## 2022-08-29 DIAGNOSIS — I10 BENIGN ESSENTIAL HTN: ICD-10-CM

## 2022-08-29 DIAGNOSIS — Z76.0 ENCOUNTER FOR MEDICATION REFILL: Primary | ICD-10-CM

## 2022-08-29 RX ORDER — LISINOPRIL 10 MG/1
TABLET ORAL
Qty: 90 TABLET | Refills: 3 | Status: SHIPPED | OUTPATIENT
Start: 2022-08-29 | End: 2023-06-06

## 2022-09-26 ENCOUNTER — HOSPITAL ENCOUNTER (OUTPATIENT)
Dept: CT IMAGING | Facility: CLINIC | Age: 67
Discharge: HOME | End: 2022-09-26
Attending: FAMILY MEDICINE | Admitting: FAMILY MEDICINE
Payer: COMMERCIAL

## 2022-09-26 DIAGNOSIS — Z87.891 PERSONAL HISTORY OF NICOTINE DEPENDENCE: ICD-10-CM

## 2022-09-26 PROCEDURE — 71271 CT THORAX LUNG CANCER SCR C-: CPT

## 2022-12-27 ENCOUNTER — OFFICE VISIT (OUTPATIENT)
Dept: FAMILY MEDICINE | Facility: CLINIC | Age: 67
End: 2022-12-27
Payer: COMMERCIAL

## 2022-12-27 VITALS
TEMPERATURE: 98.4 F | RESPIRATION RATE: 16 BRPM | HEIGHT: 70 IN | DIASTOLIC BLOOD PRESSURE: 74 MMHG | OXYGEN SATURATION: 98 % | BODY MASS INDEX: 28.99 KG/M2 | HEART RATE: 61 BPM | SYSTOLIC BLOOD PRESSURE: 122 MMHG | WEIGHT: 202.5 LBS

## 2022-12-27 DIAGNOSIS — K43.9 VENTRAL HERNIA WITHOUT OBSTRUCTION OR GANGRENE: ICD-10-CM

## 2022-12-27 DIAGNOSIS — I10 BENIGN ESSENTIAL HTN: ICD-10-CM

## 2022-12-27 DIAGNOSIS — Z00.00 PREVENTATIVE HEALTH CARE: Primary | ICD-10-CM

## 2022-12-27 DIAGNOSIS — E11.9 TYPE 2 DIABETES MELLITUS TREATED WITHOUT INSULIN (H): ICD-10-CM

## 2022-12-27 DIAGNOSIS — I10 BENIGN ESSENTIAL HTN: Primary | ICD-10-CM

## 2022-12-27 LAB
ALBUMIN UR-MCNC: NEGATIVE MG/DL
ANION GAP SERPL CALCULATED.3IONS-SCNC: 14 MMOL/L (ref 7–15)
APPEARANCE UR: CLEAR
BILIRUB UR QL STRIP: NEGATIVE
BUN SERPL-MCNC: 26.9 MG/DL (ref 8–23)
CALCIUM SERPL-MCNC: 9.5 MG/DL (ref 8.8–10.2)
CHLORIDE SERPL-SCNC: 102 MMOL/L (ref 98–107)
COLOR UR AUTO: YELLOW
CREAT SERPL-MCNC: 1.31 MG/DL (ref 0.67–1.17)
CREAT UR-MCNC: 117 MG/DL
DEPRECATED HCO3 PLAS-SCNC: 24 MMOL/L (ref 22–29)
ERYTHROCYTE [DISTWIDTH] IN BLOOD BY AUTOMATED COUNT: 14.3 % (ref 10–15)
GFR SERPL CREATININE-BSD FRML MDRD: 59 ML/MIN/1.73M2
GLUCOSE SERPL-MCNC: 99 MG/DL (ref 70–99)
GLUCOSE UR STRIP-MCNC: NEGATIVE MG/DL
HBA1C MFR BLD: 6 % (ref 0–5.6)
HCT VFR BLD AUTO: 39.7 % (ref 40–53)
HGB BLD-MCNC: 12.8 G/DL (ref 13.3–17.7)
HGB UR QL STRIP: NEGATIVE
KETONES UR STRIP-MCNC: NEGATIVE MG/DL
LEUKOCYTE ESTERASE UR QL STRIP: NEGATIVE
MCH RBC QN AUTO: 26.8 PG (ref 26.5–33)
MCHC RBC AUTO-ENTMCNC: 32.2 G/DL (ref 31.5–36.5)
MCV RBC AUTO: 83 FL (ref 78–100)
MICROALBUMIN UR-MCNC: 19.5 MG/L
MICROALBUMIN/CREAT UR: 16.67 MG/G CR (ref 0–17)
NITRATE UR QL: NEGATIVE
PH UR STRIP: 6 [PH] (ref 5–7)
PLATELET # BLD AUTO: 199 10E3/UL (ref 150–450)
POTASSIUM SERPL-SCNC: 4.6 MMOL/L (ref 3.4–5.3)
RBC # BLD AUTO: 4.77 10E6/UL (ref 4.4–5.9)
SODIUM SERPL-SCNC: 140 MMOL/L (ref 136–145)
SP GR UR STRIP: 1.02 (ref 1–1.03)
UROBILINOGEN UR STRIP-ACNC: 0.2 E.U./DL
WBC # BLD AUTO: 4.9 10E3/UL (ref 4–11)

## 2022-12-27 PROCEDURE — 83036 HEMOGLOBIN GLYCOSYLATED A1C: CPT | Performed by: FAMILY MEDICINE

## 2022-12-27 PROCEDURE — 81003 URINALYSIS AUTO W/O SCOPE: CPT | Performed by: FAMILY MEDICINE

## 2022-12-27 PROCEDURE — 36415 COLL VENOUS BLD VENIPUNCTURE: CPT | Performed by: FAMILY MEDICINE

## 2022-12-27 PROCEDURE — 82043 UR ALBUMIN QUANTITATIVE: CPT | Performed by: FAMILY MEDICINE

## 2022-12-27 PROCEDURE — 99215 OFFICE O/P EST HI 40 MIN: CPT | Performed by: FAMILY MEDICINE

## 2022-12-27 PROCEDURE — 85027 COMPLETE CBC AUTOMATED: CPT | Performed by: FAMILY MEDICINE

## 2022-12-27 PROCEDURE — 80048 BASIC METABOLIC PNL TOTAL CA: CPT | Performed by: FAMILY MEDICINE

## 2022-12-27 PROCEDURE — 82570 ASSAY OF URINE CREATININE: CPT | Performed by: FAMILY MEDICINE

## 2022-12-27 NOTE — PROGRESS NOTES
"  Assessment & Plan     Benign essential HTN  Controlled, continue same meds/doses  - CBC with platelets  - Basic metabolic panel  (Ca, Cl, CO2, Creat, Gluc, K, Na, BUN)  - Hemoglobin A1c  - Albumin Random Urine Quantitative with Creat Ratio  - UA Macro with Reflex to Micro and Culture - lab collect    Type 2 diabetes mellitus treated without insulin (H)  Very well controlled with A1-C at 6.0  - CBC with platelets  - Basic metabolic panel  (Ca, Cl, CO2, Creat, Gluc, K, Na, BUN)  - Hemoglobin A1c  - Albumin Random Urine Quantitative with Creat Ratio  - UA Macro with Reflex to Micro and Culture - lab collect    Preventative health care  reviewed  - REVIEW OF HEALTH MAINTENANCE PROTOCOL ORDERS    Ventral hernia without obstruction or gangrene  He has known he's had these and he wants to know if they can be repaired now that he's on Medicare. Since he is losing weight, they are markedly more visible and bothersome. I told him I am happy to refer him but I am not the one who can answer his questions. He wants to explore this. Referred to general surgery. I advised him to see a general, general surgeon.  - Adult General Surg Referral      Review of external notes as documented elsewhere in note  Ordering of each unique test  Prescription drug management  51 minutes spent on the date of the encounter doing chart review, history and exam, documentation and further activities per the note     BMI:   Estimated body mass index is 29.26 kg/m  as calculated from the following:    Height as of this encounter: 1.772 m (5' 9.75\").    Weight as of this encounter: 91.9 kg (202 lb 8 oz).   Weight management plan: Discussed healthy diet and exercise guidelines    Return in about 6 months (around 6/27/2023) for Routine preventive.    Martine Ng MD  [facility] SEVEN Martinez is a 67 year old, presenting for the following health issues:  Diabetes and Hypertension    Walks 3 miles per day along the " "[place] - loves it  Does stretches and bands (with [employer] - online)  Goal is 190. At home he was 198.6  Has a hopeful goal of waterskiing again, nesha slalom    History of Present Illness       Reason for visit:  Check up    He eats 2-3 servings of fruits and vegetables daily.He consumes 0 sweetened beverage(s) daily.He exercises with enough effort to increase his heart rate 30 to 60 minutes per day.  He exercises with enough effort to increase his heart rate 6 days per week.   He is taking medications regularly.     Has a hernia. Was set up for surgery 5 years ago but had a bowel obstruction a week before. He is wondering if that could be done now?    Has noted slightly higher sugars    Saw the urologist; now is only peeing 1-2 times per night (used to be 4)     Review of Systems   Left anterior shoulder pain with shoveling      Objective    /74   Pulse 61   Temp 98.4  F (36.9  C) (Oral)   Resp 16   Ht 1.772 m (5' 9.75\")   Wt 91.9 kg (202 lb 8 oz)   SpO2 98%   BMI 29.26 kg/m    Body mass index is 29.26 kg/m .  Physical Exam   GENERAL: healthy, alert and no distress  NECK: no adenopathy, no asymmetry, masses, or scars and thyroid normal to palpation  RESP: lungs clear to auscultation - no rales, rhonchi or wheezes  CV: regular rate and rhythm, normal S1 S2, no S3 or S4, no murmur, click or rub, no peripheral edema and peripheral pulses strong  MS: no gross musculoskeletal defects noted, no edema  PSYCH: mentation appears normal, affect normal/bright  Diabetic foot exam: normal DP and PT pulses, no trophic changes or ulcerative lesions and normal sensory exam    Results for orders placed or performed in visit on 12/27/22   CBC with platelets     Status: Abnormal   Result Value Ref Range    WBC Count 4.9 4.0 - 11.0 10e3/uL    RBC Count 4.77 4.40 - 5.90 10e6/uL    Hemoglobin 12.8 (L) 13.3 - 17.7 g/dL    Hematocrit 39.7 (L) 40.0 - 53.0 %    MCV 83 78 - 100 fL    MCH 26.8 26.5 - 33.0 pg    MCHC 32.2 31.5 " - 36.5 g/dL    RDW 14.3 10.0 - 15.0 %    Platelet Count 199 150 - 450 10e3/uL   Basic metabolic panel  (Ca, Cl, CO2, Creat, Gluc, K, Na, BUN)     Status: Abnormal   Result Value Ref Range    Sodium 140 136 - 145 mmol/L    Potassium 4.6 3.4 - 5.3 mmol/L    Chloride 102 98 - 107 mmol/L    Carbon Dioxide (CO2) 24 22 - 29 mmol/L    Anion Gap 14 7 - 15 mmol/L    Urea Nitrogen 26.9 (H) 8.0 - 23.0 mg/dL    Creatinine 1.31 (H) 0.67 - 1.17 mg/dL    Calcium 9.5 8.8 - 10.2 mg/dL    Glucose 99 70 - 99 mg/dL    GFR Estimate 59 (L) >60 mL/min/1.73m2   Hemoglobin A1c     Status: Abnormal   Result Value Ref Range    Hemoglobin A1C 6.0 (H) 0.0 - 5.6 %   Albumin Random Urine Quantitative with Creat Ratio     Status: None   Result Value Ref Range    Albumin Urine mg/L 19.5 mg/L    Albumin Urine mg/g Cr 16.67 0.00 - 17.00 mg/g Cr    Creatinine Urine mg/dL 117.0 mg/dL   UA Macro with Reflex to Micro and Culture - lab collect     Status: Normal    Specimen: Urine, NOS   Result Value Ref Range    Color Urine Yellow Colorless, Straw, Light Yellow, Yellow    Appearance Urine Clear Clear    Glucose Urine Negative Negative mg/dL    Bilirubin Urine Negative Negative    Ketones Urine Negative Negative mg/dL    Specific Gravity Urine 1.020 1.005 - 1.030    Blood Urine Negative Negative    pH Urine 6.0 5.0 - 7.0    Protein Albumin Urine Negative Negative mg/dL    Urobilinogen Urine 0.2 0.2, 1.0 E.U./dL    Nitrite Urine Negative Negative    Leukocyte Esterase Urine Negative Negative    Narrative    Microscopic not indicated

## 2023-02-16 ENCOUNTER — TRANSFERRED RECORDS (OUTPATIENT)
Dept: HEALTH INFORMATION MANAGEMENT | Facility: CLINIC | Age: 68
End: 2023-02-16
Payer: COMMERCIAL

## 2023-02-27 ENCOUNTER — TRANSFERRED RECORDS (OUTPATIENT)
Dept: HEALTH INFORMATION MANAGEMENT | Facility: CLINIC | Age: 68
End: 2023-02-27
Payer: COMMERCIAL

## 2023-06-06 ENCOUNTER — OFFICE VISIT (OUTPATIENT)
Dept: FAMILY MEDICINE | Facility: CLINIC | Age: 68
End: 2023-06-06
Payer: COMMERCIAL

## 2023-06-06 VITALS
SYSTOLIC BLOOD PRESSURE: 129 MMHG | WEIGHT: 192 LBS | HEART RATE: 54 BPM | DIASTOLIC BLOOD PRESSURE: 70 MMHG | RESPIRATION RATE: 14 BRPM | HEIGHT: 69 IN | BODY MASS INDEX: 28.44 KG/M2 | OXYGEN SATURATION: 99 % | TEMPERATURE: 98 F

## 2023-06-06 DIAGNOSIS — N18.31 STAGE 3A CHRONIC KIDNEY DISEASE (H): ICD-10-CM

## 2023-06-06 DIAGNOSIS — L84 CALLUS OF FOOT: ICD-10-CM

## 2023-06-06 DIAGNOSIS — D50.8 OTHER IRON DEFICIENCY ANEMIA: ICD-10-CM

## 2023-06-06 DIAGNOSIS — Z13.21 ENCOUNTER FOR VITAMIN DEFICIENCY SCREENING: ICD-10-CM

## 2023-06-06 DIAGNOSIS — F10.10 ALCOHOL ABUSE: Chronic | ICD-10-CM

## 2023-06-06 DIAGNOSIS — R53.83 OTHER FATIGUE: ICD-10-CM

## 2023-06-06 DIAGNOSIS — Z00.00 ENCOUNTER FOR ANNUAL WELLNESS VISIT (AWV) IN MEDICARE PATIENT: Primary | ICD-10-CM

## 2023-06-06 DIAGNOSIS — I10 BENIGN ESSENTIAL HTN: Primary | ICD-10-CM

## 2023-06-06 DIAGNOSIS — E87.1 HYPONATREMIA: ICD-10-CM

## 2023-06-06 DIAGNOSIS — E11.9 TYPE 2 DIABETES MELLITUS TREATED WITHOUT INSULIN (H): ICD-10-CM

## 2023-06-06 DIAGNOSIS — I10 BENIGN ESSENTIAL HTN: ICD-10-CM

## 2023-06-06 DIAGNOSIS — E11.9 TYPE 2 DIABETES MELLITUS TREATED WITHOUT INSULIN (H): Primary | ICD-10-CM

## 2023-06-06 DIAGNOSIS — Z76.0 ENCOUNTER FOR MEDICATION REFILL: ICD-10-CM

## 2023-06-06 LAB
ALBUMIN UR-MCNC: NEGATIVE MG/DL
ANION GAP SERPL CALCULATED.3IONS-SCNC: 15 MMOL/L (ref 7–15)
APPEARANCE UR: CLEAR
BACTERIA #/AREA URNS HPF: ABNORMAL /HPF
BILIRUB UR QL STRIP: NEGATIVE
BUN SERPL-MCNC: 28.5 MG/DL (ref 8–23)
CALCIUM SERPL-MCNC: 9.5 MG/DL (ref 8.8–10.2)
CHLORIDE SERPL-SCNC: 103 MMOL/L (ref 98–107)
CHOLEST SERPL-MCNC: 134 MG/DL
COLOR UR AUTO: YELLOW
CREAT SERPL-MCNC: 1.18 MG/DL (ref 0.67–1.17)
CREAT UR-MCNC: 88.4 MG/DL
DEPRECATED HCO3 PLAS-SCNC: 22 MMOL/L (ref 22–29)
ERYTHROCYTE [DISTWIDTH] IN BLOOD BY AUTOMATED COUNT: 14.4 % (ref 10–15)
FERRITIN SERPL-MCNC: 170 NG/ML (ref 31–409)
GFR SERPL CREATININE-BSD FRML MDRD: 67 ML/MIN/1.73M2
GLUCOSE SERPL-MCNC: 87 MG/DL (ref 70–99)
GLUCOSE UR STRIP-MCNC: NEGATIVE MG/DL
HBA1C MFR BLD: 5.6 % (ref 0–5.6)
HCT VFR BLD AUTO: 38.3 % (ref 40–53)
HDLC SERPL-MCNC: 71 MG/DL
HGB BLD-MCNC: 12.2 G/DL (ref 13.3–17.7)
HGB UR QL STRIP: ABNORMAL
HYALINE CASTS #/AREA URNS LPF: ABNORMAL /LPF
IRON BINDING CAPACITY (ROCHE): 226 UG/DL (ref 240–430)
IRON SATN MFR SERPL: 45 % (ref 15–46)
IRON SERPL-MCNC: 101 UG/DL (ref 61–157)
KETONES UR STRIP-MCNC: ABNORMAL MG/DL
LDLC SERPL CALC-MCNC: 55 MG/DL
LEUKOCYTE ESTERASE UR QL STRIP: NEGATIVE
MCH RBC QN AUTO: 27 PG (ref 26.5–33)
MCHC RBC AUTO-ENTMCNC: 31.9 G/DL (ref 31.5–36.5)
MCV RBC AUTO: 85 FL (ref 78–100)
MICROALBUMIN UR-MCNC: 17.8 MG/L
MICROALBUMIN/CREAT UR: 20.14 MG/G CR (ref 0–17)
MUCOUS THREADS #/AREA URNS LPF: PRESENT /LPF
NITRATE UR QL: NEGATIVE
NONHDLC SERPL-MCNC: 63 MG/DL
PH UR STRIP: 5.5 [PH] (ref 5–7)
PLATELET # BLD AUTO: 199 10E3/UL (ref 150–450)
POTASSIUM SERPL-SCNC: 4.6 MMOL/L (ref 3.4–5.3)
RBC # BLD AUTO: 4.52 10E6/UL (ref 4.4–5.9)
RBC #/AREA URNS AUTO: ABNORMAL /HPF
RETICS # AUTO: 0.03 10E6/UL (ref 0.01–0.11)
RETICS/RBC NFR AUTO: 0.7 % (ref 0.8–2.7)
SODIUM SERPL-SCNC: 140 MMOL/L (ref 136–145)
SP GR UR STRIP: 1.01 (ref 1–1.03)
SQUAMOUS #/AREA URNS AUTO: ABNORMAL /LPF
TRIGL SERPL-MCNC: 42 MG/DL
UROBILINOGEN UR STRIP-ACNC: 0.2 E.U./DL
WBC # BLD AUTO: 5.3 10E3/UL (ref 4–11)
WBC #/AREA URNS AUTO: ABNORMAL /HPF

## 2023-06-06 PROCEDURE — 80061 LIPID PANEL: CPT | Performed by: FAMILY MEDICINE

## 2023-06-06 PROCEDURE — 99213 OFFICE O/P EST LOW 20 MIN: CPT | Mod: 25 | Performed by: FAMILY MEDICINE

## 2023-06-06 PROCEDURE — 83036 HEMOGLOBIN GLYCOSYLATED A1C: CPT | Performed by: FAMILY MEDICINE

## 2023-06-06 PROCEDURE — 83550 IRON BINDING TEST: CPT | Performed by: FAMILY MEDICINE

## 2023-06-06 PROCEDURE — 81001 URINALYSIS AUTO W/SCOPE: CPT | Performed by: FAMILY MEDICINE

## 2023-06-06 PROCEDURE — 11056 PARNG/CUTG B9 HYPRKR LES 2-4: CPT | Performed by: FAMILY MEDICINE

## 2023-06-06 PROCEDURE — 82248 BILIRUBIN DIRECT: CPT | Performed by: FAMILY MEDICINE

## 2023-06-06 PROCEDURE — 82043 UR ALBUMIN QUANTITATIVE: CPT | Performed by: FAMILY MEDICINE

## 2023-06-06 PROCEDURE — 84443 ASSAY THYROID STIM HORMONE: CPT | Performed by: FAMILY MEDICINE

## 2023-06-06 PROCEDURE — 82570 ASSAY OF URINE CREATININE: CPT | Performed by: FAMILY MEDICINE

## 2023-06-06 PROCEDURE — 85045 AUTOMATED RETICULOCYTE COUNT: CPT | Performed by: FAMILY MEDICINE

## 2023-06-06 PROCEDURE — 36415 COLL VENOUS BLD VENIPUNCTURE: CPT | Performed by: FAMILY MEDICINE

## 2023-06-06 PROCEDURE — 83540 ASSAY OF IRON: CPT | Performed by: FAMILY MEDICINE

## 2023-06-06 PROCEDURE — 82607 VITAMIN B-12: CPT | Performed by: FAMILY MEDICINE

## 2023-06-06 PROCEDURE — 80053 COMPREHEN METABOLIC PANEL: CPT | Performed by: FAMILY MEDICINE

## 2023-06-06 PROCEDURE — 82728 ASSAY OF FERRITIN: CPT | Performed by: FAMILY MEDICINE

## 2023-06-06 PROCEDURE — 85027 COMPLETE CBC AUTOMATED: CPT | Performed by: FAMILY MEDICINE

## 2023-06-06 PROCEDURE — G0439 PPPS, SUBSEQ VISIT: HCPCS | Performed by: FAMILY MEDICINE

## 2023-06-06 RX ORDER — LISINOPRIL 10 MG/1
10 TABLET ORAL DAILY
Qty: 90 TABLET | Refills: 3 | Status: SHIPPED | OUTPATIENT
Start: 2023-06-06 | End: 2024-06-21

## 2023-06-06 RX ORDER — LANCETS
EACH MISCELLANEOUS
Qty: 100 EACH | Refills: 11 | Status: SHIPPED | OUTPATIENT
Start: 2023-06-06 | End: 2024-07-12

## 2023-06-06 RX ORDER — ATORVASTATIN CALCIUM 10 MG/1
TABLET, FILM COATED ORAL
Qty: 90 TABLET | Refills: 4 | Status: SHIPPED | OUTPATIENT
Start: 2023-06-06 | End: 2024-09-18

## 2023-06-06 ASSESSMENT — ENCOUNTER SYMPTOMS
PARESTHESIAS: 0
FREQUENCY: 0
DIZZINESS: 0
PALPITATIONS: 0
MYALGIAS: 0
HEMATURIA: 0
ARTHRALGIAS: 0
DIARRHEA: 0
SHORTNESS OF BREATH: 0
NAUSEA: 0
SORE THROAT: 0
COUGH: 0
CHILLS: 0
WEAKNESS: 0
CONSTIPATION: 0
NERVOUS/ANXIOUS: 0
ABDOMINAL PAIN: 0
DYSURIA: 0
HEADACHES: 0
HEARTBURN: 0
JOINT SWELLING: 0
EYE PAIN: 0
FEVER: 0
HEMATOCHEZIA: 0

## 2023-06-06 ASSESSMENT — ACTIVITIES OF DAILY LIVING (ADL): CURRENT_FUNCTION: NO ASSISTANCE NEEDED

## 2023-06-07 LAB
ALBUMIN SERPL BCG-MCNC: 4.8 G/DL (ref 3.5–5.2)
ALP SERPL-CCNC: 76 U/L (ref 40–129)
ALT SERPL W P-5'-P-CCNC: 20 U/L (ref 10–50)
AST SERPL W P-5'-P-CCNC: 33 U/L (ref 10–50)
BILIRUB DIRECT SERPL-MCNC: 0.2 MG/DL (ref 0–0.3)
BILIRUB SERPL-MCNC: 0.7 MG/DL
PROT SERPL-MCNC: 7.9 G/DL (ref 6.4–8.3)
TSH SERPL DL<=0.005 MIU/L-ACNC: 0.75 UIU/ML (ref 0.3–4.2)
VIT B12 SERPL-MCNC: 530 PG/ML (ref 232–1245)

## 2023-06-12 ENCOUNTER — TELEPHONE (OUTPATIENT)
Dept: FAMILY MEDICINE | Facility: CLINIC | Age: 68
End: 2023-06-12
Payer: COMMERCIAL

## 2023-06-12 DIAGNOSIS — R41.3 MEMORY DIFFICULTIES: ICD-10-CM

## 2023-06-12 DIAGNOSIS — D50.8 IRON DEFICIENCY ANEMIA SECONDARY TO INADEQUATE DIETARY IRON INTAKE: ICD-10-CM

## 2023-06-12 DIAGNOSIS — N18.31 STAGE 3A CHRONIC KIDNEY DISEASE (H): Primary | ICD-10-CM

## 2023-06-12 NOTE — LETTER
June 13, 2023      Juan Fleming  [address] [place] [address]        Dear ,    We are writing to inform you of your test results.    You have mild anemia, meaning your red blood count is a bit low.     Because of this, Dr. Ng would like you to take a couple supplements over the summer and then get the labs rechecked in September or October. If the labs are normal then, we will do nothing. If the abnormalities persist, then you should see a blood specialist.     Dr. Ng suggest you take:      Folic Acid/Folate 400mcg daily  Iron 65mg either 1/2 tab daily or 1 whole tab every other day (watch out for constipation with this) with tomato, orange, or strawberry (for vit C) or else buy a vit C supplement to take with it.   And  Vit B12 1000mcg daily.     Dr. Ng recommend you take Nature Made brand or Dukes brand vitamins because of their proven quality.     You have a follow up appointment with Dr. Ng on 10/20/23. Please schedule a lab appointment a few days before your follow-up appointment with Dr. Ng.       Resulted Orders   CBC with platelets   Result Value Ref Range    WBC Count 5.3 4.0 - 11.0 10e3/uL    RBC Count 4.52 4.40 - 5.90 10e6/uL    Hemoglobin 12.2 (L) 13.3 - 17.7 g/dL    Hematocrit 38.3 (L) 40.0 - 53.0 %    MCV 85 78 - 100 fL    MCH 27.0 26.5 - 33.0 pg    MCHC 31.9 31.5 - 36.5 g/dL    RDW 14.4 10.0 - 15.0 %    Platelet Count 199 150 - 450 10e3/uL   Basic metabolic panel  (Ca, Cl, CO2, Creat, Gluc, K, Na, BUN)   Result Value Ref Range    Sodium 140 136 - 145 mmol/L    Potassium 4.6 3.4 - 5.3 mmol/L    Chloride 103 98 - 107 mmol/L    Carbon Dioxide (CO2) 22 22 - 29 mmol/L    Anion Gap 15 7 - 15 mmol/L    Urea Nitrogen 28.5 (H) 8.0 - 23.0 mg/dL    Creatinine 1.18 (H) 0.67 - 1.17 mg/dL    Calcium 9.5 8.8 - 10.2 mg/dL    Glucose 87 70 - 99 mg/dL    GFR Estimate 67 >60 mL/min/1.73m2      Comment:      eGFR calculated using 2021 CKD-EPI equation.   Lipid panel reflex to  direct LDL Non-fasting   Result Value Ref Range    Cholesterol 134 <200 mg/dL    Triglycerides 42 <150 mg/dL    Direct Measure HDL 71 >=40 mg/dL    LDL Cholesterol Calculated 55 <=100 mg/dL    Non HDL Cholesterol 63 <130 mg/dL    Narrative    Cholesterol  Desirable:  <200 mg/dL    Triglycerides  Normal:  Less than 150 mg/dL  Borderline High:  150-199 mg/dL  High:  200-499 mg/dL  Very High:  Greater than or equal to 500 mg/dL    Direct Measure HDL  Female:  Greater than or equal to 50 mg/dL   Male:  Greater than or equal to 40 mg/dL    LDL Cholesterol  Desirable:  <100mg/dL  Above Desirable:  100-129 mg/dL   Borderline High:  130-159 mg/dL   High:  160-189 mg/dL   Very High:  >= 190 mg/dL    Non HDL Cholesterol  Desirable:  130 mg/dL  Above Desirable:  130-159 mg/dL  Borderline High:  160-189 mg/dL  High:  190-219 mg/dL  Very High:  Greater than or equal to 220 mg/dL   UA Macroscopic with reflex to Microscopic and Culture   Result Value Ref Range    Color Urine Yellow Colorless, Straw, Light Yellow, Yellow    Appearance Urine Clear Clear    Glucose Urine Negative Negative mg/dL    Bilirubin Urine Negative Negative    Ketones Urine Trace (A) Negative mg/dL    Specific Gravity Urine 1.015 1.005 - 1.030    Blood Urine Trace (A) Negative    pH Urine 5.5 5.0 - 7.0    Protein Albumin Urine Negative Negative mg/dL    Urobilinogen Urine 0.2 0.2, 1.0 E.U./dL    Nitrite Urine Negative Negative    Leukocyte Esterase Urine Negative Negative   Albumin Random Urine Quantitative with Creat Ratio   Result Value Ref Range    Creatinine Urine mg/dL 88.4 mg/dL      Comment:      The reference ranges have not been established in urine creatinine. The results should be integrated into the clinical context for interpretation.    Albumin Urine mg/L 17.8 mg/L      Comment:      The reference ranges have not been established in urine albumin. The results should be integrated into the clinical context for interpretation.    Albumin Urine mg/g  Cr 20.14 (H) 0.00 - 17.00 mg/g Cr      Comment:      Microalbuminuria is defined as an albumin:creatinine ratio of 17 to 299 for males and 25 to 299 for females. A ratio of albumin:creatinine of 300 or higher is indicative of overt proteinuria.  Due to biologic variability, positive results should be confirmed by a second, first-morning random or 24-hour timed urine specimen. If there is discrepancy, a third specimen is recommended. When 2 out of 3 results are in the microalbuminuria range, this is evidence for incipient nephropathy and warrants increased efforts at glucose control, blood pressure control, and institution of therapy with an angiotensin-converting-enzyme (ACE) inhibitor (if the patient can tolerate it).     Hemoglobin A1c   Result Value Ref Range    Hemoglobin A1C 5.6 0.0 - 5.6 %      Comment:      Normal <5.7%   Prediabetes 5.7-6.4%    Diabetes 6.5% or higher     Note: Adopted from ADA consensus guidelines.   UA Microscopic with Reflex to Culture   Result Value Ref Range    Bacteria Urine Few (A) None Seen /HPF    RBC Urine 0-2 0-2 /HPF /HPF    WBC Urine 0-5 0-5 /HPF /HPF    Squamous Epithelials Urine None Seen None Seen /LPF    Mucus Urine Present (A) None Seen /LPF    Hyaline Casts Urine 2-5 (A) None Seen /LPF    Narrative    Urine Culture not indicated   Iron and iron binding capacity   Result Value Ref Range    Iron 101 61 - 157 ug/dL    Iron Binding Capacity 226 (L) 240 - 430 ug/dL    Iron Sat Index 45 15 - 46 %   Ferritin   Result Value Ref Range    Ferritin 170 31 - 409 ng/mL   Reticulocyte count   Result Value Ref Range    % Reticulocyte 0.7 (L) 0.8 - 2.7 %    Absolute Reticulocyte 0.031 0.010 - 0.110 10e6/uL   Vitamin B12   Result Value Ref Range    Vitamin B12 530 232 - 1,245 pg/mL   TSH with free T4 reflex   Result Value Ref Range    TSH 0.75 0.30 - 4.20 uIU/mL   Hepatic panel (Albumin, ALT, AST, Bili, Alk Phos, TP)   Result Value Ref Range    Protein Total 7.9 6.4 - 8.3 g/dL    Albumin  4.8 3.5 - 5.2 g/dL    Bilirubin Total 0.7 <=1.2 mg/dL    Alkaline Phosphatase 76 40 - 129 U/L    AST 33 10 - 50 U/L    ALT 20 10 - 50 U/L    Bilirubin Direct 0.20 0.00 - 0.30 mg/dL       If you have any questions or concerns, please call the clinic at the number listed above.       Sincerely,      Vipul Quiroga, BSN RN  [facility]

## 2023-06-13 NOTE — TELEPHONE ENCOUNTER
Called pt and relayed Dr. Ng's message. Pt requested a [employer] message sent with all informations.    [employer] message sent and appt scheduled 10/20/23.      CIARA Littlejohn CemN RN  [facility]

## 2023-06-13 NOTE — TELEPHONE ENCOUNTER
Please call Juan.    Let him know he has mild anemia, meaning his red blood count is a bit low.    Because of this, Dr. Ng would like him to take a couple supplements over the summer and then get the labs rechecked in September or October. If the labs are normal then, we will do nothing. If the abnormalities persist, then he should see a blood specialist.    I suggest he take:     Folic Acid/Folate 400mcg daily  Iron 65mg either 1/2 tab daily or 1 whole tab every other day (watch out for constipation with this) with tomato, orange, or strawberry (for vit C) or else buy a vit C supplement to take with it.   And  Vit B12 1000mcg daily.    I recommend he take Nature Made brand or Dukes brand vitamins because of their proven quality.    Please help him to make a lab-only appointment in Sept or Oct with an appointment with me a day or two after.    thanks

## 2023-09-09 ENCOUNTER — HEALTH MAINTENANCE LETTER (OUTPATIENT)
Age: 68
End: 2023-09-09

## 2023-09-25 ENCOUNTER — TRANSFERRED RECORDS (OUTPATIENT)
Dept: HEALTH INFORMATION MANAGEMENT | Facility: CLINIC | Age: 68
End: 2023-09-25
Payer: COMMERCIAL

## 2023-09-25 LAB — RETINOPATHY: NEGATIVE

## 2023-10-06 DIAGNOSIS — Z87.891 PERSONAL HISTORY OF NICOTINE DEPENDENCE: Primary | ICD-10-CM

## 2023-10-17 ENCOUNTER — HOSPITAL ENCOUNTER (OUTPATIENT)
Dept: CT IMAGING | Facility: CLINIC | Age: 68
Discharge: HOME | End: 2023-10-17
Attending: FAMILY MEDICINE | Admitting: FAMILY MEDICINE
Payer: COMMERCIAL

## 2023-10-17 DIAGNOSIS — Z87.891 PERSONAL HISTORY OF NICOTINE DEPENDENCE: ICD-10-CM

## 2023-10-17 PROCEDURE — 71271 CT THORAX LUNG CANCER SCR C-: CPT

## 2023-10-18 ENCOUNTER — LAB (OUTPATIENT)
Dept: LAB | Facility: CLINIC | Age: 68
End: 2023-10-18
Payer: COMMERCIAL

## 2023-10-18 DIAGNOSIS — E11.9 TYPE 2 DIABETES MELLITUS TREATED WITHOUT INSULIN (H): ICD-10-CM

## 2023-10-18 DIAGNOSIS — E11.9 TYPE 2 DIABETES MELLITUS TREATED WITHOUT INSULIN (H): Primary | ICD-10-CM

## 2023-10-18 DIAGNOSIS — D50.8 IRON DEFICIENCY ANEMIA SECONDARY TO INADEQUATE DIETARY IRON INTAKE: ICD-10-CM

## 2023-10-18 DIAGNOSIS — R41.3 MEMORY DIFFICULTIES: ICD-10-CM

## 2023-10-18 DIAGNOSIS — N18.31 STAGE 3A CHRONIC KIDNEY DISEASE (H): ICD-10-CM

## 2023-10-18 LAB
ANION GAP SERPL CALCULATED.3IONS-SCNC: 14 MMOL/L (ref 7–15)
BASO+EOS+MONOS # BLD AUTO: ABNORMAL 10*3/UL
BASO+EOS+MONOS NFR BLD AUTO: ABNORMAL %
BASOPHILS # BLD AUTO: 0 10E3/UL (ref 0–0.2)
BASOPHILS NFR BLD AUTO: 1 %
BUN SERPL-MCNC: 18.2 MG/DL (ref 8–23)
CALCIUM SERPL-MCNC: 9.1 MG/DL (ref 8.8–10.2)
CHLORIDE SERPL-SCNC: 101 MMOL/L (ref 98–107)
CREAT SERPL-MCNC: 0.95 MG/DL (ref 0.67–1.17)
DEPRECATED HCO3 PLAS-SCNC: 22 MMOL/L (ref 22–29)
EGFRCR SERPLBLD CKD-EPI 2021: 87 ML/MIN/1.73M2
EOSINOPHIL # BLD AUTO: 0 10E3/UL (ref 0–0.7)
EOSINOPHIL NFR BLD AUTO: 0 %
ERYTHROCYTE [DISTWIDTH] IN BLOOD BY AUTOMATED COUNT: 14.1 % (ref 10–15)
FERRITIN SERPL-MCNC: 139 NG/ML (ref 31–409)
GLUCOSE SERPL-MCNC: 125 MG/DL (ref 70–99)
HBA1C MFR BLD: 6.2 %
HCT VFR BLD AUTO: 35.9 % (ref 40–53)
HGB BLD-MCNC: 12 G/DL (ref 13.3–17.7)
IMM GRANULOCYTES # BLD: 0 10E3/UL
IMM GRANULOCYTES NFR BLD: 0 %
IRON BINDING CAPACITY (ROCHE): 222 UG/DL (ref 240–430)
IRON SATN MFR SERPL: 29 % (ref 15–46)
IRON SERPL-MCNC: 64 UG/DL (ref 61–157)
LYMPHOCYTES # BLD AUTO: 0.9 10E3/UL (ref 0.8–5.3)
LYMPHOCYTES NFR BLD AUTO: 13 %
MCH RBC QN AUTO: 28 PG (ref 26.5–33)
MCHC RBC AUTO-ENTMCNC: 33.4 G/DL (ref 31.5–36.5)
MCV RBC AUTO: 84 FL (ref 78–100)
MONOCYTES # BLD AUTO: 0.3 10E3/UL (ref 0–1.3)
MONOCYTES NFR BLD AUTO: 4 %
NEUTROPHILS # BLD AUTO: 5.5 10E3/UL (ref 1.6–8.3)
NEUTROPHILS NFR BLD AUTO: 82 %
PLATELET # BLD AUTO: 200 10E3/UL (ref 150–450)
POTASSIUM SERPL-SCNC: 4.5 MMOL/L (ref 3.4–5.3)
RBC # BLD AUTO: 4.29 10E6/UL (ref 4.4–5.9)
RETICS # AUTO: 0.04 10E6/UL (ref 0.01–0.11)
RETICS/RBC NFR AUTO: 0.8 % (ref 0.8–2.7)
SODIUM SERPL-SCNC: 137 MMOL/L (ref 135–145)
VIT B12 SERPL-MCNC: 796 PG/ML (ref 232–1245)
WBC # BLD AUTO: 6.7 10E3/UL (ref 4–11)

## 2023-10-18 PROCEDURE — 82728 ASSAY OF FERRITIN: CPT

## 2023-10-18 PROCEDURE — 82607 VITAMIN B-12: CPT

## 2023-10-18 PROCEDURE — 85025 COMPLETE CBC W/AUTO DIFF WBC: CPT

## 2023-10-18 PROCEDURE — 83540 ASSAY OF IRON: CPT

## 2023-10-18 PROCEDURE — 36415 COLL VENOUS BLD VENIPUNCTURE: CPT

## 2023-10-18 PROCEDURE — 83036 HEMOGLOBIN GLYCOSYLATED A1C: CPT

## 2023-10-18 PROCEDURE — 80048 BASIC METABOLIC PNL TOTAL CA: CPT

## 2023-10-18 PROCEDURE — 83550 IRON BINDING TEST: CPT

## 2023-10-18 PROCEDURE — 85045 AUTOMATED RETICULOCYTE COUNT: CPT

## 2023-10-20 ENCOUNTER — OFFICE VISIT (OUTPATIENT)
Dept: FAMILY MEDICINE | Facility: CLINIC | Age: 68
End: 2023-10-20
Payer: COMMERCIAL

## 2023-10-20 VITALS
OXYGEN SATURATION: 98 % | HEART RATE: 86 BPM | BODY MASS INDEX: 29.04 KG/M2 | TEMPERATURE: 97.1 F | DIASTOLIC BLOOD PRESSURE: 68 MMHG | SYSTOLIC BLOOD PRESSURE: 138 MMHG | HEIGHT: 69 IN | RESPIRATION RATE: 16 BRPM | WEIGHT: 196.08 LBS

## 2023-10-20 DIAGNOSIS — K43.9 VENTRAL HERNIA WITHOUT OBSTRUCTION OR GANGRENE: ICD-10-CM

## 2023-10-20 DIAGNOSIS — Z29.11 NEED FOR VACCINATION AGAINST RESPIRATORY SYNCYTIAL VIRUS: ICD-10-CM

## 2023-10-20 DIAGNOSIS — Z23 NEED FOR VACCINATION: ICD-10-CM

## 2023-10-20 DIAGNOSIS — Z01.818 PREOP GENERAL PHYSICAL EXAM: Primary | ICD-10-CM

## 2023-10-20 DIAGNOSIS — I10 BENIGN ESSENTIAL HTN: ICD-10-CM

## 2023-10-20 DIAGNOSIS — E11.8 TYPE 2 DIABETES MELLITUS WITH COMPLICATION, WITHOUT LONG-TERM CURRENT USE OF INSULIN (H): Chronic | ICD-10-CM

## 2023-10-20 DIAGNOSIS — H25.013 CORTICAL AGE-RELATED CATARACT OF BOTH EYES: ICD-10-CM

## 2023-10-20 DIAGNOSIS — S81.811D LACERATION OF RIGHT LOWER EXTREMITY, SUBSEQUENT ENCOUNTER: ICD-10-CM

## 2023-10-20 DIAGNOSIS — E78.5 DYSLIPIDEMIA: ICD-10-CM

## 2023-10-20 PROBLEM — L03.115 CELLULITIS OF RIGHT LEG: Status: RESOLVED | Noted: 2018-03-11 | Resolved: 2023-10-20

## 2023-10-20 PROCEDURE — 90471 IMMUNIZATION ADMIN: CPT | Performed by: FAMILY MEDICINE

## 2023-10-20 PROCEDURE — 90678 RSV VACC PREF BIVALENT IM: CPT | Performed by: FAMILY MEDICINE

## 2023-10-20 PROCEDURE — 99215 OFFICE O/P EST HI 40 MIN: CPT | Mod: 25 | Performed by: FAMILY MEDICINE

## 2023-10-20 RX ORDER — RESPIRATORY SYNCYTIAL VIRUS VACCINE 120MCG/0.5
0.5 KIT INTRAMUSCULAR ONCE
Qty: 1 EACH | Refills: 0 | Status: SHIPPED | OUTPATIENT
Start: 2023-10-20 | End: 2023-10-20

## 2023-10-20 NOTE — PROGRESS NOTES
"  Assessment & Plan     Preop general physical exam  Reason for visit- need to know the time of the surgery to advise on when to take his meds.    Cortical age-related cataract of both eyes  Reason for surgery    Need for vaccination against respiratory syncytial virus  given  - RSV VACCINE (ABRYSVO)    Ventral hernia without obstruction or gangrene  Noted - send to general surgery to assess need for surgery  - Adult General Surg Referral    Type 2 diabetes mellitus with complication, without long-term current use of insulin (H)  Managed extremely well with A1-C 6.2, last time 5.6. He is active and does well.    Benign essential HTN  Well controlled with lisinopril 10mg daily, plus his activity    Dyslipidemia  On atrovastatin with LDL under 70    Anemia  It is not clear to me why the hemoglobin keeps drifting downward (now he is off aspirin). Keep up iron, follow, but refer to hematology if the drift continues.    Laceration - return for nurse-only visit next week to have stitches removed.      Review of external notes as documented elsewhere in note  Ordering of each unique test  Prescription drug management  60 minutes spent by me on the date of the encounter doing chart review, history and exam, documentation and further activities per the note       BMI:   Estimated body mass index is 28.71 kg/m  as calculated from the following:    Height as of this encounter: 1.76 m (5' 9.29\").    Weight as of this encounter: 88.9 kg (196 lb 1.3 oz).   Weight management plan: Discussed healthy diet and exercise guidelines    He will try to get his first cataract surgery planned ASAP. I assume the second one will be more than 30 days out, so he will have to return for an update on his pre-op exam. We'll schedule that once the surgical date is known.  He will also let me know the TIME of his surgeries, so I can correctly advise him on when to take his meds around the surgery.    Martine Ng MD  [facility] " "SEVEN Martinez is a 68 year old, presenting for the following health issues:  Follow Up (Low iron and discuss lung screening )        10/20/2023     7:08 AM   Additional Questions   Roomed by Jaki SINGLETON MA       History of Present Illness       Reason for visit:  Low iron    He eats 4 or more servings of fruits and vegetables daily.He consumes 0 sweetened beverage(s) daily.He exercises with enough effort to increase his heart rate 30 to 60 minutes per day.  He exercises with enough effort to increase his heart rate 6 days per week.   He is taking medications regularly.     Quit aspirin - to prevent bleeding anywhere to decrease his hemoglobin.  He wants to know if he should remain on the iron supplement?    Got flu and covid shots already but wants the RSV shot.    Stitches in right leg - slipped on his boat. When/how does he get the stiches out?    Is on Vitron C - vit c coated iron. Is also on psyllium - no consiptation    Planning to get his cataracts out as soon as it can be scheduled.    Review of Systems   Pees at night so cutting out water in the later PM     - he is not on any narcotics    Meds - reviewed with him. I'll need to know the time of his surgeries in order to advise about what meds to take when.      Objective    /68   Pulse 86   Temp 97.1  F (36.2  C) (Temporal)   Resp 16   Ht 1.76 m (5' 9.29\")   Wt 88.9 kg (196 lb 1.3 oz)   SpO2 98%   BMI 28.71 kg/m    Body mass index is 28.71 kg/m .  Physical Exam   GENERAL: healthy, alert and no distress  EYES: Eyes grossly normal to inspection, PERRL and conjunctivae and sclerae normal  HENT: ear canals and TM's normal, nose and mouth without ulcers or lesions  NECK: no adenopathy, no asymmetry, masses, or scars and thyroid normal to palpation  RESP: lungs clear to auscultation - no rales, rhonchi or wheezes  CV: regular rates and rhythm and no peripheral edema  ABDOMEN: soft, nontender, without hepatosplenomegaly or masses, " bowel sounds normal, and just right and caudal to umbilicus is a hernia bulging out but no erythema and it is easily reduced without any pain. The defect is approximately 1.5 x 2.5cm.  MS: no gross musculoskeletal defects noted, no edema  SKIN: no suspicious lesions or rashes and right lower leg has a healing wound with 6 stitches, no erythema, pus or swelling  PSYCH: mentation appears normal, affect normal/bright    Together we reviewed his lung CT and his labs

## 2023-10-25 ENCOUNTER — OFFICE VISIT (OUTPATIENT)
Dept: SURGERY | Facility: CLINIC | Age: 68
End: 2023-10-25
Attending: FAMILY MEDICINE
Payer: COMMERCIAL

## 2023-10-25 DIAGNOSIS — K43.9 VENTRAL HERNIA WITHOUT OBSTRUCTION OR GANGRENE: ICD-10-CM

## 2023-10-25 PROCEDURE — 99203 OFFICE O/P NEW LOW 30 MIN: CPT | Performed by: SURGERY

## 2023-10-25 NOTE — PROGRESS NOTES
HPI:  Juan Fleming is a 68 year old male who was referred to me by Martine Ng for a ventral hernia.  He presents with complaints of a bulge in the mid abdominal region with a slight increase in size but no discomfort he has noted this for the past several years.  He denies any nausea, vomiting, fevers, chills, bloody bowel movements or any other complaints at this time. Previous surgeries include hernia repair in the distant past with subsequent exploratory laparotomy, mesh explantation.    Allergies:Patient has no known allergies.    Past Medical History:   Diagnosis Date    Alcohol abuse     Colorectal polyps 02/01/2020    none high grade; repeat colonoscopy in 3 yrs - next 2/2026    Diabetes mellitus (H)     Hernia 09/07/2017    Ventral    Hypertension     Influenza     PE (pulmonary embolism)     Perforated bowel (H)        Past Surgical History:   Procedure Laterality Date    ABDOMEN SURGERY      Colen resection    HERNIORRHAPHY VENTRAL N/A 11/3/2017    Procedure: REPAIR, HERNIA, VENTRAL, OPEN;  Surgeon: Maxine Rivera MD;  Location: [facility];  Service:     [facility] APPENDECTOMY      Description: Appendectomy;  Recorded: 07/30/2008;    [facility] EXPLORATORY OF ABDOMEN N/A 11/3/2017    Procedure: LAPAROTOMY, LYSIS OF ADHESIONS, MESH EXPLANTATION VENTRAL HERNIA REPAIR WITH MESH ;  Surgeon: Maxine Rivera MD;  Location: [facility];  Service: General    [facility] REPAIR INCISIONAL HERNIA,REDUCIBLE      Description: Ventral Hernia Repair;  Recorded: 07/30/2008;    [facility] REPAIR UMBILICAL ERAN,<4Y/O,REDUC      Description: Umbilical Hernia Repair;  Recorded: 07/30/2008;       CURRENT MEDS:  Current Outpatient Medications   Medication Sig Dispense Refill    atorvastatin (LIPITOR) 10 MG tablet Take 1 tablet (10 mg total) by mouth daily. 90 tablet 4    blood glucose (CONTOUR NEXT TEST) test strip Use 1 each to test blood glucose  As Directed Daily at 8:00 am 200 strip 3    lisinopril (ZESTRIL) 10 MG tablet Take  1 tablet (10 mg) by mouth daily 90 tablet 3    Microlet Lancets MISC Use 1 each to test blood glucose As Directed Daily at 8:00 am 100 each 11       Family History   Problem Relation Age of Onset    Mental Illness Mother         depression and drugs/alcohol    Cerebrovascular Disease Father 94    CABG Father     Pulmonary Embolism Father     Osteoarthritis Father         hip replaced    Abdominal Aortic Aneurysm Father     Other - See Comments Maternal Aunt         cancer was found, then committed suicide    Dementia Maternal Aunt         reports that he quit smoking about 8 years ago. His smoking use included cigarettes. He started smoking about 55 years ago. He has a 27.00 pack-year smoking history. He has never been exposed to tobacco smoke. He has never used smokeless tobacco. He reports that he does not drink alcohol and does not use drugs.    Review of Systems -   The 12 point review of systems  is within normal limits except for as mentioned above in the HPI.  General ROS: No complaints or constitutional symptoms  Ophthalmic ROS: No complaints of visual changes  Skin: No complaints or symptoms   Endocrine: No complaints or symptoms  Hematologic/Lymphatic: No symptoms or complaints  Psychiatric: No symptoms or complaints  Respiratory ROS: no cough, shortness of breath, or wheezing  Cardiovascular ROS: no chest pain or dyspnea on exertion  Gastrointestinal ROS: As per HPI  Genito-Urinary ROS: no dysuria, trouble voiding, or hematuria  Musculoskeletal ROS: no joint or muscle pain  Neurological ROS: no TIA or stroke symptoms      There were no vitals taken for this visit.  There is no height or weight on file to calculate BMI.    EXAM:  GENERAL: Well developed male  HEENT: Extra ocular muscles intact, pupils are round and reactive, sclera is anicteric,   NECK:  No obvious masses or deformities  ABDOMEN: Palpable moderately sized recurrent incisional hernia approximately 7 to 8 cm with reducible intestinal  contents  NEURO: No obvious defects noted.  EXT: No edema, no obvious deformities or any other abnormalities        Assessment/Plan:    Juan Fleming is a 68 year old male with a recurrent incisional hernia.  The pathophysiology of these hernias was discussed as were the surgical and non-operative management strategies.      The risks of surgery were discussed which include, but are not limited to, bleeding, infection, recurrent hernia, chronic pain, poor cosmesis, blood clots, stroke, heart attack and death.  Additionally, the risks of observation were discussed which include, but are not limited to, enlargement of the hernia, incarceration, strangulation, pain and death.  Surgical options including open, laparoscopic and robotic hernia repair were discussed in detail.      He understands everything which was discussed and will discuss his options with his wife.  If you would like to have surgery we will plan on obtaining a CT scan for better characterization.  He will most likely require a robotic bilateral transversus abdominis repair and abdominal wall reconstruction given the size of the hernias.    Eduardo Espinoza D.O. [facility]  [phone]  [facility] Department of Surgery

## 2023-10-25 NOTE — LETTER
10/25/2023         RE: Juan Fleming  [address] [place] [address]        Dear Colleague,    Thank you for referring your patient, Juan Fleming, to the [facility] [place]. Please see a copy of my visit note below.    HPI:  Juan Fleming is a 68 year old male who was referred to me by Martine Ng for a ventral hernia.  He presents with complaints of a bulge in the mid abdominal region with a slight increase in size but no discomfort he has noted this for the past several years.  He denies any nausea, vomiting, fevers, chills, bloody bowel movements or any other complaints at this time. Previous surgeries include hernia repair in the distant past with subsequent exploratory laparotomy, mesh explantation.    Allergies:Patient has no known allergies.    Past Medical History:   Diagnosis Date     Alcohol abuse      Colorectal polyps 02/01/2020    none high grade; repeat colonoscopy in 3 yrs - next 2/2026     Diabetes mellitus (H)      Hernia 09/07/2017    Ventral     Hypertension      Influenza      PE (pulmonary embolism)      Perforated bowel (H)        Past Surgical History:   Procedure Laterality Date     ABDOMEN SURGERY      Colen resection     HERNIORRHAPHY VENTRAL N/A 11/3/2017    Procedure: REPAIR, HERNIA, VENTRAL, OPEN;  Surgeon: Maxine Rivera MD;  Location: [facility];  Service:      [facility] APPENDECTOMY      Description: Appendectomy;  Recorded: 07/30/2008;     [facility] EXPLORATORY OF ABDOMEN N/A 11/3/2017    Procedure: LAPAROTOMY, LYSIS OF ADHESIONS, MESH EXPLANTATION VENTRAL HERNIA REPAIR WITH MESH ;  Surgeon: Maxine Rivera MD;  Location: [facility];  Service: General     [facility] REPAIR INCISIONAL HERNIA,REDUCIBLE      Description: Ventral Hernia Repair;  Recorded: 07/30/2008;     [facility] REPAIR UMBILICAL ERAN,<4Y/O,REDUC      Description: Umbilical Hernia Repair;  Recorded: 07/30/2008;       CURRENT MEDS:  Current Outpatient Medications  Bad chol too high, double pravastatin and repeat labs 6wks   Medication Sig Dispense Refill     atorvastatin (LIPITOR) 10 MG tablet Take 1 tablet (10 mg total) by mouth daily. 90 tablet 4     blood glucose (CONTOUR NEXT TEST) test strip Use 1 each to test blood glucose  As Directed Daily at 8:00 am 200 strip 3     lisinopril (ZESTRIL) 10 MG tablet Take 1 tablet (10 mg) by mouth daily 90 tablet 3     Microlet Lancets MISC Use 1 each to test blood glucose As Directed Daily at 8:00 am 100 each 11       Family History   Problem Relation Age of Onset     Mental Illness Mother         depression and drugs/alcohol     Cerebrovascular Disease Father 94     CABG Father      Pulmonary Embolism Father      Osteoarthritis Father         hip replaced     Abdominal Aortic Aneurysm Father      Other - See Comments Maternal Aunt         cancer was found, then committed suicide     Dementia Maternal Aunt         reports that he quit smoking about 8 years ago. His smoking use included cigarettes. He started smoking about 55 years ago. He has a 27.00 pack-year smoking history. He has never been exposed to tobacco smoke. He has never used smokeless tobacco. He reports that he does not drink alcohol and does not use drugs.    Review of Systems -   The 12 point review of systems  is within normal limits except for as mentioned above in the HPI.  General ROS: No complaints or constitutional symptoms  Ophthalmic ROS: No complaints of visual changes  Skin: No complaints or symptoms   Endocrine: No complaints or symptoms  Hematologic/Lymphatic: No symptoms or complaints  Psychiatric: No symptoms or complaints  Respiratory ROS: no cough, shortness of breath, or wheezing  Cardiovascular ROS: no chest pain or dyspnea on exertion  Gastrointestinal ROS: As per HPI  Genito-Urinary ROS: no dysuria, trouble voiding, or hematuria  Musculoskeletal ROS: no joint or muscle pain  Neurological ROS: no TIA or stroke symptoms      There were no vitals taken for this visit.  There is no height or weight on file to  calculate BMI.    EXAM:  GENERAL: Well developed male  HEENT: Extra ocular muscles intact, pupils are round and reactive, sclera is anicteric,   NECK:  No obvious masses or deformities  ABDOMEN: Palpable moderately sized recurrent incisional hernia approximately 7 to 8 cm with reducible intestinal contents  NEURO: No obvious defects noted.  EXT: No edema, no obvious deformities or any other abnormalities        Assessment/Plan:    Juan Fleming is a 68 year old male with a recurrent incisional hernia.  The pathophysiology of these hernias was discussed as were the surgical and non-operative management strategies.      The risks of surgery were discussed which include, but are not limited to, bleeding, infection, recurrent hernia, chronic pain, poor cosmesis, blood clots, stroke, heart attack and death.  Additionally, the risks of observation were discussed which include, but are not limited to, enlargement of the hernia, incarceration, strangulation, pain and death.  Surgical options including open, laparoscopic and robotic hernia repair were discussed in detail.      He understands everything which was discussed and will discuss his options with his wife.  If you would like to have surgery we will plan on obtaining a CT scan for better characterization.  He will most likely require a robotic bilateral transversus abdominis repair and abdominal wall reconstruction given the size of the hernias.    Eduardo Espinoza D.O. [facility]  [phone]  [facility] Department of Surgery      Again, thank you for allowing me to participate in the care of your patient.        Sincerely,        Bon Espinoza, DO

## 2023-10-26 ENCOUNTER — ALLIED HEALTH/NURSE VISIT (OUTPATIENT)
Dept: FAMILY MEDICINE | Facility: CLINIC | Age: 68
End: 2023-10-26
Payer: COMMERCIAL

## 2023-10-26 DIAGNOSIS — Z48.02 VISIT FOR SUTURE REMOVAL: Primary | ICD-10-CM

## 2023-10-26 PROCEDURE — 99207 PR NO CHARGE NURSE ONLY: CPT

## 2023-10-26 NOTE — PROGRESS NOTES
Juan Fleming presents to the clinic today for removal of sutures.  The patient has had the sutures in place for 9 days.  There has been no history of infection or drainage.  6 sutures are seen located on the right leg.  The wound is healing well with no signs of infection.  Tetanus status is up to date.   All sutures were easily removed today.  Routine wound care discussed.  The patient will follow up as needed.        MARTITA Littlejohn Cem, RN  [facility]     [facility]  Progress Note - Sandy Lomax 1946, 67 y o  female MRN: [id number]  Unit/Bed#: W -01 Encounter: [id number]  Primary Care Provider: Dipak Kebede MD   Date and time admitted to hospital: 6/24/2022  8:30 AM    Anemia  Assessment & Plan  - Acute blood loss in the setting of chronic anemia  - History of chronic anemia hemoglobin is typically around 10  - Patient was 10 9 on admission  - Patient loss approximately 300 mL blood during her surgery  - 6/26 1 unit PRBCs  - Repeat hemoglobin responded to transfusion appropriately  - Continue to monitor for signs of symptomatic anemia though in the setting beta-blocker use patient will not likely be tachycardic   - on 06/28/2022 patient noted to have hemoglobin at 6 7; ordered 1 unit of packed red blood cells  - will follow-up with post transfusion hemoglobin    Atrial fibrillation ([facility] )  Assessment & Plan  - Continue home Coreg  - Patient anemic--6 8  Continue to hold Xarelto  Continue heparin for DVT prophylaxis  - Will re-evaluate today on 6/28 for appropriateness of restarting home Xarelto and aspirin  CAD (coronary artery disease)  Assessment & Plan  - History of PCI in 2011  - Continue home Ranexa and Coreg    Tachy-sarah beth syndrome ([facility] [address] [facility] )  Assessment & Plan  - Followed by [facility] cardiology  - Continue Coreg  - S/p pacemaker    Hypertension  Assessment & Plan  - Continue home carvedilol and nifedipine   - Continue to hold home lisinopril due to normal BP s/p orthopedic intervention and concern for SAHIL    - Continue to monitor BP and kidney functioning    Acute renal failure superimposed on stage 3 chronic kidney disease [facility])  Assessment & Plan  Lab Results   Component Value Date    EGFR 15 06/27/2022    EGFR 18 06/26/2022    EGFR 30 06/25/2022    CREATININE 2 82 (H) 06/27/2022    CREATININE 2 41 (H) 06/26/2022    CREATININE 1 64 (H) 06/25/2022     - Associated with diabetes and hypertension, baseline Cr appears to be between 1 6-2 0  - 1 83 on admission  - Creatinine worsened postoperatively to 2 82 on 6/27; will follow-up repeat today  - 6/26  ABLA likely caused post-op SAHIL, 1 unit of blood transfused  - Continue IVF isolate 50mL/hr  - Nephrology consult placed  - Continue to monitor Cr for improvement    Diabetes mellitus type 2, insulin dependent [facility])  Assessment & Plan  No results found for: HGBA1C    Recent Labs     06/27/22  0717 06/27/22  1122 06/27/22  1519 06/27/22  2102   POCGLU 216* 220* 188* 228*       Blood Sugar Average: Last 72 hrs:  (P) 214  - 2/22 hemoglobin A1c was 7 1  - Hold home oral anti diabetics  - Restarted Lantus   - Continue to monitor blood sugars throughout the day with use of sliding scale insulin  - Rickie controlled diet    Blind  Assessment & Plan  - Family assists at bedside  - Additional assistance will be provided as needed to accommodate patient  Fall  Assessment & Plan  - Status post fall with the below noted injuries  - Fall precautions  - Geriatric Medicine consultation for evaluation, medication review and recommendations   - PT and OT evaluation and treatment as indicated  - Case Management consultation for disposition planning  * Closed left hip fracture [facility])  Assessment & Plan  - Left hip fractures, present on admission   - Status post IM nail on 6/26  - Appreciate Orthopedic surgery evaluation, recommendations and interventions as noted  - Maintain WBAT status on the left extremity   - Monitor left lower extremity extremity neurovascular exam   - Continue multimodal analgesic regimen   - Continue DVT prophylaxis  - PT and OT evaluation and treatment as indicated  - Outpatient follow up with Orthopedic surgery for re-evaluation  DVT prophylaxis: SCDs and SQH  PT and OT: eval and treat    Disposition:  Follow-up a m  Labs  Nephrology consult the setting of SAHIL  Continue gentle hydration at this time    Secondary to patient having acute blood loss anemia; will transfuse 1 unit of packed red blood cells  Will not resume Xarelto and aspirin today secondary to anemia  Will follow up with post transfusion hemoglobin  Creatinine is also improving today  Nephrology is following  SUBJECTIVE:  Chief Complaint:  No new complaints    Subjective:  Patient is resting in bed accompanied by family today  They received a phone medical update  Patient is stating that she is feeling well today  States she has no new pain  No new nausea or vomiting  No new chest pain or shortness of breath  OBJECTIVE:   Vitals:   Temp:  [98 °F (36 7 °C)-99 5 °F (37 5 °C)] 98 2 °F (36 8 °C)  HR:  [60-69] 60  Resp:  [16-17] 17  BP: ()/(40-84) 118/43    Intake/Output:  I/O       06/26 0701 06/27 0700 06/27 0701  06/28 0700 06/28 0701 06/29 0700    P  O  480 360     I V  (mL/kg)  1891 7 (22 5)     Blood 350      IV Piggyback       Total Intake(mL/kg) 830 (9 8) 2251 7 (26 8)     Urine (mL/kg/hr)  2300 (1 1)     Blood       Total Output  2300     Net +830 -48  3                 Nutrition: Diet Rickie/CHO Controlled; Consistent Carbohydrate Diet Level 2 (5 carb servings/75 grams CHO/meal)      Physical Exam:   GENERAL APPEARANCE:  No acute distress  NEURO:  GCS 15  HEENT:  Normocephalic  CV:  Regular rate and rhythm  LUNGS:  CTA bilaterally  GI:  Nontender, nondistended  :  No Key  MSK:  Left hip tenderness to palpation over the incision site but otherwise no appreciable expanding hematoma on examination; +2 pulses  SKIN:  Warm, dry, intact    Invasive Devices  Report    Peripheral Intravenous Line  Duration           Peripheral IV 06/27/22 Proximal;Right;Ventral (anterior) Antecubital <1 day                      Lab Results: Results: I have personally reviewed all pertinent laboratory/tests results, BMP/CMP:   Lab Results   Component Value Date    SODIUM 136 06/28/2022    K 4 2 06/28/2022     06/28/2022    CO2 26 06/28/2022    BUN 52 (H) 06/28/2022    CREATININE 2 51 (H) 06/28/2022 CALCIUM 7 2 (L) 06/28/2022    EGFR 18 06/28/2022    and CBC:   Lab Results   Component Value Date    WBC 7 61 06/28/2022    HGB 6 7 (LL) 06/28/2022    HCT 21 2 (L) 06/28/2022    MCV 88 06/28/2022     06/28/2022    MCH 27 7 06/28/2022    MCHC 31 6 06/28/2022    RDW 15 1 06/28/2022    MPV 11 6 06/28/2022     Imaging/EKG Studies: I have personally reviewed pertinent reports       Other Studies:  No other studies

## 2023-11-09 ENCOUNTER — TELEPHONE (OUTPATIENT)
Dept: SURGERY | Facility: CLINIC | Age: 68
End: 2023-11-09
Payer: COMMERCIAL

## 2023-11-09 NOTE — TELEPHONE ENCOUNTER
Returned patients call from earlier this afternoon regarding questions he has after seeing Dr. Espinoza. Left message for patient and invited him to call me back.

## 2023-11-09 NOTE — LETTER
Pre-op Physical: 12/1/2023 at 10:30 am with Dr. Ng at the [facility]    Surgery Date: 12/12/2023     Location: [place] [address]    Approximate Arrival Time: 6:00 am  (Unless instructed differently by the pre-op call nurse)     Post op Appointment: 12/28/2023 at  9:50 am with  Dr. Espinoza  [facility] [address] [place] [address] [address] [address].    Pre-Surgical Tasks:     Schedule a pre-op physical with your primary care doctor if not internal to [facility].  If internal, we have scheduled this.   The pre-op physical must be 10-30 days before surgery and since it is required by anesthesia, your surgery will be cancelled if it's not done.      Review all medications with your primary care or prescribing physician; they will advise you which meds to stop and when, and when you can resume taking.  Certain medications like blood thinners and weight loss medications need to be stopped in advance of surgery to proceed safely.      Blood thinners including but not exclusive to drugs like Xarelto, Eliquis, Warfarin and Aspirin, should be stopped five days before surgery, if your prescribing provider agrees. Follow your provider's advice on stopping blood thinners because they know you best.  If you are unsure if your medication is a blood thinner, ask your prescribing provider.    Weight loss medications: There are multiple medications being used for weight management and diabetes today, and the list is growing.  Phentermine, Ozempic, Wegovy, Trulicity, and other similar medications need to be stopped one week before surgery to avoid being cancelled.  Victoza and Saxenda can be continued longer but must be stopped one full day before surgery.  Please ask your prescribing provider for advice.    Diabetic medications: in addition to the medications talked about above that are used for either weight  loss or diabetes, some people are on insulin that may require adjustment.  Please discuss managing diabetic medications with your prescribing doctor as these medications may require modification prior to surgery.     Please shower the evening before and morning of surgery with Hibiclens soap.  Any [facility] can provide this to you at no cost, or it can be found at your local pharmacy.     Fasting instructions will be provided by the pre-op nurse who will call you 1-3 days before surgery.  Typically, we advise normal food up to 8 hours before you arrive for surgery. Clear liquids only from then until 2 hours before you arrive surgery, then nothing at all by mouth.  The nurse will review your specific instructions with you at the call.      Smoking impacts your body's ability to heal properly so we advise patients to quit if possible before surgery.  Plastic Surgery patients are required to be nicotine free for at least 8 weeks before surgery.      You will need an adult to drive you home and stay with you 24 hours after surgery. Public transportation or Medical Van Services are not permitted.    Visitor restrictions are subject to change, please verify with the pre-op nurse when they call how many people are permitted to accompany you.    We always encourage you to notify your insurance any time you have medical tests or procedures scheduled including surgery. The number is usually right on the back of your insurance card. To obtain pricing for surgery, please call [facility] Cost of Care at [phone] or email [email or web address].        Call our office if you have any questions! Thank you!

## 2023-11-10 NOTE — TELEPHONE ENCOUNTER
Spoke with patient who would like to move forward with the CT imaging and  surgery as discussed during the 10/25 visit with Dr. Espinoza.    Patient also had questions about the prior authorization process which was explained.    Message routed to provider for imaging order and case  request per results.

## 2023-11-13 ENCOUNTER — PREP FOR PROCEDURE (OUTPATIENT)
Dept: SURGERY | Facility: CLINIC | Age: 68
End: 2023-11-13
Payer: COMMERCIAL

## 2023-11-13 DIAGNOSIS — K43.9 VENTRAL HERNIA WITHOUT OBSTRUCTION OR GANGRENE: Primary | ICD-10-CM

## 2023-11-13 RX ORDER — CEFAZOLIN SODIUM/WATER 2 G/20 ML
2 SYRINGE (ML) INTRAVENOUS SEE ADMIN INSTRUCTIONS
Status: CANCELLED | OUTPATIENT
Start: 2023-12-12

## 2023-11-13 RX ORDER — CEFAZOLIN SODIUM/WATER 2 G/20 ML
2 SYRINGE (ML) INTRAVENOUS
Status: CANCELLED | OUTPATIENT
Start: 2023-12-12

## 2023-11-14 NOTE — TELEPHONE ENCOUNTER
Spoke with patient today regarding surgery scheduling     Went over details/instructions.    Surgery Letter sent via [facility]  (Please see LETTERS TAB in chart to retrieve a copy of this letter)      Pt is contacting the imaging department to schedule his CT prior to surgery

## 2023-11-17 ENCOUNTER — HOSPITAL ENCOUNTER (OUTPATIENT)
Dept: CT IMAGING | Facility: CLINIC | Age: 68
Discharge: HOME | End: 2023-11-17
Attending: SURGERY | Admitting: SURGERY
Payer: COMMERCIAL

## 2023-11-17 DIAGNOSIS — K43.9 VENTRAL HERNIA WITHOUT OBSTRUCTION OR GANGRENE: ICD-10-CM

## 2023-11-17 PROCEDURE — 74176 CT ABD & PELVIS W/O CONTRAST: CPT

## 2023-11-28 ENCOUNTER — HOSPITAL ENCOUNTER (EMERGENCY)
Facility: CLINIC | Age: 68
Discharge: HOME | End: 2023-11-28
Attending: EMERGENCY MEDICINE | Admitting: EMERGENCY MEDICINE
Payer: COMMERCIAL

## 2023-11-28 ENCOUNTER — OFFICE VISIT (OUTPATIENT)
Dept: FAMILY MEDICINE | Facility: CLINIC | Age: 68
End: 2023-11-28
Payer: COMMERCIAL

## 2023-11-28 ENCOUNTER — APPOINTMENT (OUTPATIENT)
Dept: CT IMAGING | Facility: CLINIC | Age: 68
End: 2023-11-28
Attending: EMERGENCY MEDICINE
Payer: COMMERCIAL

## 2023-11-28 ENCOUNTER — ANCILLARY PROCEDURE (OUTPATIENT)
Dept: GENERAL RADIOLOGY | Facility: CLINIC | Age: 68
End: 2023-11-28
Attending: PHYSICIAN ASSISTANT
Payer: COMMERCIAL

## 2023-11-28 VITALS
RESPIRATION RATE: 16 BRPM | DIASTOLIC BLOOD PRESSURE: 84 MMHG | TEMPERATURE: 97.7 F | OXYGEN SATURATION: 97 % | HEART RATE: 50 BPM | SYSTOLIC BLOOD PRESSURE: 164 MMHG

## 2023-11-28 VITALS
TEMPERATURE: 97.5 F | BODY MASS INDEX: 27.92 KG/M2 | HEART RATE: 62 BPM | DIASTOLIC BLOOD PRESSURE: 64 MMHG | RESPIRATION RATE: 18 BRPM | OXYGEN SATURATION: 97 % | HEIGHT: 70 IN | SYSTOLIC BLOOD PRESSURE: 132 MMHG | WEIGHT: 195 LBS

## 2023-11-28 DIAGNOSIS — K56.7 ILEUS (H): ICD-10-CM

## 2023-11-28 DIAGNOSIS — K56.609 SMALL BOWEL OBSTRUCTION (H): Primary | ICD-10-CM

## 2023-11-28 DIAGNOSIS — R10.84 ABDOMINAL PAIN, GENERALIZED: ICD-10-CM

## 2023-11-28 LAB
ALBUMIN SERPL BCG-MCNC: 4.8 G/DL (ref 3.5–5.2)
ALP SERPL-CCNC: 89 U/L (ref 40–150)
ALT SERPL W P-5'-P-CCNC: 13 U/L (ref 0–70)
ANION GAP SERPL CALCULATED.3IONS-SCNC: 11 MMOL/L (ref 7–15)
AST SERPL W P-5'-P-CCNC: 24 U/L (ref 0–45)
BASOPHILS # BLD AUTO: 0.1 10E3/UL (ref 0–0.2)
BASOPHILS NFR BLD AUTO: 0 %
BILIRUB DIRECT SERPL-MCNC: <0.2 MG/DL (ref 0–0.3)
BILIRUB SERPL-MCNC: 0.4 MG/DL
BUN SERPL-MCNC: 23.7 MG/DL (ref 8–23)
CALCIUM SERPL-MCNC: 10 MG/DL (ref 8.8–10.2)
CHLORIDE SERPL-SCNC: 101 MMOL/L (ref 98–107)
CREAT SERPL-MCNC: 1.15 MG/DL (ref 0.67–1.17)
CRP SERPL-MCNC: <3 MG/L
DEPRECATED HCO3 PLAS-SCNC: 27 MMOL/L (ref 22–29)
EGFRCR SERPLBLD CKD-EPI 2021: 69 ML/MIN/1.73M2
EOSINOPHIL # BLD AUTO: 0 10E3/UL (ref 0–0.7)
EOSINOPHIL NFR BLD AUTO: 0 %
ERYTHROCYTE [DISTWIDTH] IN BLOOD BY AUTOMATED COUNT: 14.8 % (ref 10–15)
GLUCOSE SERPL-MCNC: 149 MG/DL (ref 70–99)
HCT VFR BLD AUTO: 39.2 % (ref 40–53)
HGB BLD-MCNC: 12.8 G/DL (ref 13.3–17.7)
IMM GRANULOCYTES # BLD: 0.1 10E3/UL
IMM GRANULOCYTES NFR BLD: 0 %
LACTATE SERPL-SCNC: 1 MMOL/L (ref 0.7–2)
LIPASE SERPL-CCNC: 47 U/L (ref 13–60)
LYMPHOCYTES # BLD AUTO: 0.8 10E3/UL (ref 0.8–5.3)
LYMPHOCYTES NFR BLD AUTO: 6 %
MCH RBC QN AUTO: 27.5 PG (ref 26.5–33)
MCHC RBC AUTO-ENTMCNC: 32.7 G/DL (ref 31.5–36.5)
MCV RBC AUTO: 84 FL (ref 78–100)
MONOCYTES # BLD AUTO: 0.6 10E3/UL (ref 0–1.3)
MONOCYTES NFR BLD AUTO: 5 %
NEUTROPHILS # BLD AUTO: 10.6 10E3/UL (ref 1.6–8.3)
NEUTROPHILS NFR BLD AUTO: 89 %
NRBC # BLD AUTO: 0 10E3/UL
NRBC BLD AUTO-RTO: 0 /100
PLATELET # BLD AUTO: 209 10E3/UL (ref 150–450)
POTASSIUM SERPL-SCNC: 4.7 MMOL/L (ref 3.4–5.3)
PROT SERPL-MCNC: 7.7 G/DL (ref 6.4–8.3)
RBC # BLD AUTO: 4.66 10E6/UL (ref 4.4–5.9)
SODIUM SERPL-SCNC: 139 MMOL/L (ref 135–145)
WBC # BLD AUTO: 12 10E3/UL (ref 4–11)

## 2023-11-28 PROCEDURE — 96360 HYDRATION IV INFUSION INIT: CPT | Mod: 59

## 2023-11-28 PROCEDURE — 74019 RADEX ABDOMEN 2 VIEWS: CPT | Mod: TC | Performed by: RADIOLOGY

## 2023-11-28 PROCEDURE — 83690 ASSAY OF LIPASE: CPT | Performed by: EMERGENCY MEDICINE

## 2023-11-28 PROCEDURE — 80053 COMPREHEN METABOLIC PANEL: CPT | Performed by: EMERGENCY MEDICINE

## 2023-11-28 PROCEDURE — 99215 OFFICE O/P EST HI 40 MIN: CPT | Performed by: PHYSICIAN ASSISTANT

## 2023-11-28 PROCEDURE — 258N000003 HC RX IP 258 OP 636: Performed by: EMERGENCY MEDICINE

## 2023-11-28 PROCEDURE — 82248 BILIRUBIN DIRECT: CPT | Performed by: EMERGENCY MEDICINE

## 2023-11-28 PROCEDURE — 74177 CT ABD & PELVIS W/CONTRAST: CPT

## 2023-11-28 PROCEDURE — 99285 EMERGENCY DEPT VISIT HI MDM: CPT | Mod: 25

## 2023-11-28 PROCEDURE — 83605 ASSAY OF LACTIC ACID: CPT | Performed by: EMERGENCY MEDICINE

## 2023-11-28 PROCEDURE — 36415 COLL VENOUS BLD VENIPUNCTURE: CPT | Performed by: EMERGENCY MEDICINE

## 2023-11-28 PROCEDURE — 250N000011 HC RX IP 250 OP 636: Mod: JZ | Performed by: EMERGENCY MEDICINE

## 2023-11-28 PROCEDURE — 85025 COMPLETE CBC W/AUTO DIFF WBC: CPT | Performed by: EMERGENCY MEDICINE

## 2023-11-28 PROCEDURE — 86140 C-REACTIVE PROTEIN: CPT | Performed by: EMERGENCY MEDICINE

## 2023-11-28 RX ORDER — ONDANSETRON 2 MG/ML
4 INJECTION INTRAMUSCULAR; INTRAVENOUS ONCE
Status: DISCONTINUED | OUTPATIENT
Start: 2023-11-28 | End: 2023-11-28

## 2023-11-28 RX ORDER — ONDANSETRON 4 MG/1
4 TABLET, ORALLY DISINTEGRATING ORAL EVERY 8 HOURS PRN
Qty: 20 TABLET | Refills: 0 | Status: SHIPPED | OUTPATIENT
Start: 2023-11-28 | End: 2023-12-02

## 2023-11-28 RX ORDER — MORPHINE SULFATE 4 MG/ML
4 INJECTION, SOLUTION INTRAMUSCULAR; INTRAVENOUS
Status: DISCONTINUED | OUTPATIENT
Start: 2023-11-28 | End: 2023-11-28

## 2023-11-28 RX ORDER — IOPAMIDOL 755 MG/ML
90 INJECTION, SOLUTION INTRAVASCULAR ONCE
Status: COMPLETED | OUTPATIENT
Start: 2023-11-28 | End: 2023-11-28

## 2023-11-28 RX ORDER — DICYCLOMINE HCL 20 MG
20 TABLET ORAL 4 TIMES DAILY PRN
Qty: 20 TABLET | Refills: 0 | Status: ON HOLD | OUTPATIENT
Start: 2023-11-28 | End: 2023-12-12

## 2023-11-28 RX ADMIN — IOPAMIDOL 90 ML: 755 INJECTION, SOLUTION INTRAVENOUS at 20:42

## 2023-11-28 RX ADMIN — SODIUM CHLORIDE, POTASSIUM CHLORIDE, SODIUM LACTATE AND CALCIUM CHLORIDE 1000 ML: 600; 310; 30; 20 INJECTION, SOLUTION INTRAVENOUS at 21:14

## 2023-11-28 NOTE — PROGRESS NOTES
Patient presents with:  Abdominal Pain: Started with abd pain today  took Pepcid which did not help much      Clinical Decision Making:  Spoke with radiologist regarding radiology findings and concern for bowel obstruction.  She does have air-fluid levels that appear to be consistent with a small bowel obstruction.  Patient is instructed to be n.p.o. and then will follow-up in the emergency room for definitive evaluation and treatment of possible hospitalization for small bowel obstruction.  Wife will drive the  to the emergency room and questions were answered to their satisfaction before discharge        ICD-10-CM    1. Small bowel obstruction (H)  K56.609       2. Abdominal pain, generalized  R10.84 XR Abdomen 2 Views          Patient Instructions   Not eat or drink anything by mouth  Present to the emergency room for evaluation and treatment        HPI:  Juan Fleming is a 68 year old male who has a past medical history of bowel obstruction and is slated for a repair of hernia who is presenting for a 1 day history of worsening generalized abdominal pain.  Patient states that the pain started out as a 4 out of 10 and increased to a 8 out of 10.  He has not had overt nausea or vomiting but has had 2 small normal soft and formed bowel movements once this morning and once this afternoon.  He states that he has still been eating.  Last time he ate was at 7:30 AM but then did not have any other subsequent meals since he has not been hungry.      History obtained from chart review and the patient.    Problem List:  2022-06: H/O small bowel obstruction  2021-12: Onychomycosis  2021-05: Heart murmur  2019-05: Bunion, left  2018-03: Cellulitis of right leg  2017-11: SBO (small bowel obstruction) (H)  2017-02: Type 2 diabetes mellitus with complication, without long-  term current use of insulin (H)  2017-02: Hyperosmolarity due to secondary diabetes mellitus (H)  2017-02: Class 2 severe obesity due to excess calories  with serious   comorbidity and body mass index (BMI) of 35.0 to 35.9 in adult (H)  2017: SINDI (acute kidney injury) (H24)  2017: Hyponatremia  2017: Hyperosmolar (nonketotic) coma (H)  2015: Pulmonary embolism (H)  2015: ARF (acute renal failure) (H24)  2015: Large bowel perforation (H)  Benign prostatic hyperplasia  History of pulmonary embolism  Acrochordon  Anxiety  Alcohol abuse  Type 2 diabetes mellitus treated without insulin (H)  Dyslipidemia  Benign essential HTN  Essential hypertension      Past Medical History:   Diagnosis Date    Alcohol abuse     Colorectal polyps 2020    none high grade; repeat colonoscopy in 3 yrs - next 2026    Diabetes mellitus (H)     Hernia 2017    Ventral    Hypertension     Influenza     PE (pulmonary embolism)     Perforated bowel (H)        Social History     Tobacco Use    Smoking status: Former     Packs/day: 1.00     Years: 27.00     Additional pack years: 0.00     Total pack years: 27.00     Types: Cigarettes     Start date:      Quit date: 2015     Years since quittin.8     Passive exposure: Never    Smokeless tobacco: Never    Tobacco comments:     no passive exposure   Substance Use Topics    Alcohol use: No     Comment: Alcoholic Drinks/day: quit drinkning        Review of Systems  As above in HPI otherwise negative.    Vitals:    23 1620   BP: (!) 164/84   Pulse: 50   Resp: 16   Temp: 97.7  F (36.5  C)   TempSrc: Oral   SpO2: 97%       General: Patient is resting comfortably no acute distress is afebrile  HEENT: Head is normocephalic atraumatic   eyes are PERRL EOMI sclera anicteric   TMs are clear bilaterally  Throat is with mild pharyngeal wall erythema and no exudate  No cervical lymphadenopathy present  LUNGS: Clear to auscultation bilaterally  HEART: Regular rate and rhythm  Skin: Without rash non-diaphoretic    Physical Exam      Labs:  Results for orders placed or performed in visit on 23   XR Abdomen 2  Views     Status: None    Narrative    EXAM: XR ABDOMEN 2 VIEWS  LOCATION: [facility]  DATE: 11/28/2023    INDICATION: Generalized abdominal pain; previous small bowel obstructions.  COMPARISON: 11/02/2017.      Impression    IMPRESSION: There are a few loops of mildly dilated, gas-filled small bowel in the central and left hemiabdomen, which could be attributable to an underlying ileus or small bowel obstruction.    Mild-to-moderate diffuse colonic/rectal stool burden.    Hernia repair changes overlying the central abdomen.       Radiology:  I have personally ordered and preliminarily reviewed the following xray, I have noted several air-fluid levels consistent with bowel obstruction.    CT Abdomen Pelvis w/o Contrast    Result Date: 11/17/2023  EXAM: CT ABDOMEN PELVIS W/O CONTRAST LOCATION: [facility] DATE: 11/17/2023 INDICATION: Recurrent incisional ventral hernia without obstruction or gangrene COMPARISON: CT 11/01/2017 TECHNIQUE: CT scan of the abdomen and pelvis was performed without IV contrast. Multiplanar reformats were obtained. Dose reduction techniques were used. CONTRAST: None. FINDINGS: LOWER CHEST: Negative. HEPATOBILIARY: Normal. PANCREAS: Normal. SPLEEN: Normal. ADRENAL GLANDS: Normal. KIDNEYS/BLADDER: Simple renal cortical cysts do not require imaging follow-up. No urinary tract calculus nor hydronephrosis. The bladder is negative. BOWEL: Partial sigmoidectomy with left lower quadrant colonic anastomosis. Previous appendectomy. Diverticulosis of the colon. No acute inflammatory change. No obstruction. LYMPH NODES: No lymphadenopathy. VASCULATURE: No aortoiliac aneurysm. PELVIC ORGANS: Normal. MUSCULOSKELETAL: Previous supraumbilical right paramidline ventral herniorrhaphy unchanged. Interval explantation of previous periumbilical ventral herniorrhaphy mesh and periumbilical/infraumbilical ventral hernia repair. Recurrent right paraumbilical ventral  hernia which contains mesenteric fat and small bowel loops without obstruction. Small fat-containing right inguinal hernia unchanged. No suspicious osseous lesions.     IMPRESSION: 1.  Recurrent right paraumbilical ventral hernia at site of explantation of periumbilical ventral herniorrhaphy mesh and periumbilical/infraumbilical hernia repair. Hernia contains small bowel and mesenteric fat without obstruction. 2.  Otherwise stable exam.        At the end of the encounter, I discussed results, diagnosis, medications. Discussed red flags for immediate return to clinic/ER, as well as indications for follow up if no improvement. Patient understood and agreed to plan. Patient was stable for discharge.

## 2023-11-29 NOTE — ED TRIAGE NOTES
Pt is being sent here tonight from urgent care. The concern from  is that pt has a small bowel obstruction per the x-ray done. PT has HX of SBO and is due to have a hernia repair in December to prevent SBO. No OTC medication PTA.     Last BM was today at 1330 and denies vomiting.      Triage Assessment (Adult)       Row Name 11/28/23 1923          Triage Assessment    Airway WDL WDL        Respiratory WDL    Respiratory WDL WDL        Skin Circulation/Temperature WDL    Skin Circulation/Temperature WDL WDL        Cardiac WDL    Cardiac WDL WDL        Peripheral/Neurovascular WDL    Peripheral Neurovascular WDL WDL        Cognitive/Neuro/Behavioral WDL    Cognitive/Neuro/Behavioral WDL WDL

## 2023-11-29 NOTE — DISCHARGE INSTRUCTIONS
Drink plenty of fluids to stay hydrated.    Use the Zofran for any nausea or vomiting.    As needed for pain control at home, take:  - over-the-counter ibuprofen 800mg by mouth every 8 hours (max dose 2400mg in 24 hours)  - over-the-counter acetaminophen 1g by mouth every 6 hours (max dose 4g in 24 hours)  - prescribed Bentyl for any abdominal pain    Follow up with your Primary Care provider in 2 days for a recheck.    Return to the Emergency Department for any persistent vomiting, severe worsening, or any other concerns.

## 2023-11-29 NOTE — ED PROVIDER NOTES
EMERGENCY DEPARTMENT ENCOUNTER      NAME: Juan Fleming  AGE: 68 year old male  YOB: 1955  MRN: [id number]  EVALUATION DATE & TIME: No admission date for patient encounter.    PCP: Martine Ng    ED PROVIDER: Guanaco Gusman M.D.      Chief Complaint   Patient presents with    Abdominal Pain         IMPRESSION  1. Ileus (H)        PLAN  - clear liquid diet for home  - NSAIDs, Bentyl, Zofran for symptoms  - close PCP follow up  - discharge to home    ED COURSE & MEDICAL DECISION MAKING    ED Course as of 11/28/23 2155 Tue Nov 28, 2023 2011 Lactate 1.0; doubt ischemic bowel. Other blood tests with WBC 12 with CRP within normal limits, no AI, no glaring electrolyte abnormality, normal bilirubin/LFTs/lipase.   2154 Patient seen in the waiting room due to boarding crisis.    68yoM with history of SBO, hernia repair, appendectomy, alcohol abuse, HTN, T2DM (not on insulin) presenting from Urgent Care for evaluation of abdominal pain, concern for SBO on X-rays at Urgent Care. Patient reports around 12pm today he developed abdominal pain & nausea; no vomiting. States pain was 9/10 but now resolved. Went to Urgent Care and sent to the ED given X-ray findings and history. States now he feels fine.    Vitals unremarkable on presentation. No abdominal tenderness whatsoever on exam with clear lungs, normal work of breathing, no peripheral edema or calf tenderness, normal neuro exam.    CT obtained given history and has ileus; no SBO, perforated viscus, intraabdominal abscess, acute emergent pathology. Labs unremarkable. Patient completely asymptomatic on recheck with no abdominal tenderness; tolerating PO without difficulty. Ok for outpatient management. Recommended clear liquid diet and outpatient follow up. Patient & wife agreeable with this plan. Return precautions and need for PCP follow up discussed and understood. No further questions at the time of discharge.        --------------------------------------------------------------------------------   --------------------------------------------------------------------------------     8:27 PM I met with the patient for the initial interview and physical examination. Discussed plan for treatment and workup in the ED.  9:44 PM Reevaluated and updated the patient with findings. We discussed the plan for discharge and the patient is agreeable. Reviewed supportive cares, symptomatic treatment, outpatient follow up, and reasons to return to the Emergency Department. Patient to be discharged by ED RN.       This patient involved a high degree of complexity in medical decision making, as significant risks were present and assessed. Recent encounters & results in medical record reviewed by me.    All workup (i.e. any EKG/labs/imaging as per charting below) reviewed and independently interpreted by me. See respective sections for details.    Broad differential considered for this patient, including but not limited to:  SBO, ileus, intraabdominal abscess, diverticulitis, SINDI, electrolyte derangement, gastritis, PUD, perforated viscus, acute aortic syndrome      See additional MDM below if interested.      MEDICATIONS GIVEN IN THE EMERGENCY DEPARTMENT  Medications   lactated ringers BOLUS 1,000 mL (1,000 mLs Intravenous $New Bag 11/28/23 2114)   iopamidol (ISOVUE-370) solution 90 mL (90 mLs Intravenous $Given 11/28/23 2042)               =================================================================      HPI  Use of : N/A         Juan Fleming is a 68 year old male with a pertinent history of SBO, perforated bowel, hernia repair surgery (2008), Herniorrhaphy ventral (2017), appendectomy, alcohol abuse, PE, HTN, DM2, who presents to this ED via walk-in with family for evaluation of abdominal pain.    Patient reports he was feeling overall fine today until 1 PM. At 1 PM, he developed severe persistent 9/10 abdominal pain. He  also associated a lot of belching. Initially, he thought it was a stomach bug but due to the persistent pain, he decided to go to . In , his Xray showed a possible SBO so they advised him to go to the ED for evaluation. Currently, patient states his pain is now a 2/10. His last BM was 1:30 PM today.    He otherwise denies associating vomiting, abdominal distention, diarrhea, and fever. He takes iron and metamucil daily. Of note, he has his pre-op appointment for his hernia repair surgery on 12/1/2023 and his hernia repair surgery scheduled on 12/12/2023. There were no other concerns/complaints at this time.      --------------- MEDICAL HISTORY ---------------  PAST MEDICAL HISTORY:  Reviewed independently by me.  Past Medical History:   Diagnosis Date    Alcohol abuse     Colorectal polyps 02/01/2020    none high grade; repeat colonoscopy in 3 yrs - next 2/2026    Diabetes mellitus (H)     Hernia 09/07/2017    Ventral    Hypertension     Influenza     PE (pulmonary embolism)     Perforated bowel (H)      Patient Active Problem List   Diagnosis    Benign prostatic hyperplasia    History of pulmonary embolism    Acrochordon    Anxiety    Alcohol abuse    Type 2 diabetes mellitus with complication, without long-term current use of insulin (H)    Hyperosmolarity due to secondary diabetes mellitus (H)    SINDI (acute kidney injury) (H24)    Hyponatremia    Dyslipidemia    Benign essential HTN    Bunion, left    Heart murmur    Onychomycosis    H/O small bowel obstruction       PAST SURGICAL HISTORY:  Reviewed independently by me.  Past Surgical History:   Procedure Laterality Date    ABDOMEN SURGERY      Colen resection    HERNIORRHAPHY VENTRAL N/A 11/3/2017    Procedure: REPAIR, HERNIA, VENTRAL, OPEN;  Surgeon: Maxine Rivera MD;  [place] [facility] [place]  Service:     [facility] APPENDECTOMY      Description: Appendectomy;  Recorded: 07/30/2008;    [facility] EXPLORATORY OF ABDOMEN N/A 11/3/2017    Procedure: LAPAROTOMY,  LYSIS OF ADHESIONS, MESH EXPLANTATION VENTRAL HERNIA REPAIR WITH MESH ;  Surgeon: Maxine Rivera MD;  Location: [facility];  Service: General    ZZ REPAIR INCISIONAL HERNIA,REDUCIBLE      Description: Ventral Hernia Repair;  Recorded: 07/30/2008;    ZZHC REPAIR UMBILICAL ERAN,<6Y/O,REDUC      Description: Umbilical Hernia Repair;  Recorded: 07/30/2008;       CURRENT MEDICATIONS:    Reviewed independently by me.  No current facility-administered medications for this encounter.    Current Outpatient Medications:     dicyclomine (BENTYL) 20 MG tablet, Take 1 tablet (20 mg) by mouth 4 times daily as needed (abdominal pain), Disp: 20 tablet, Rfl: 0    ondansetron (ZOFRAN ODT) 4 MG ODT tab, Take 1 tablet (4 mg) by mouth every 8 hours as needed, Disp: 20 tablet, Rfl: 0    atorvastatin (LIPITOR) 10 MG tablet, Take 1 tablet (10 mg total) by mouth daily., Disp: 90 tablet, Rfl: 4    blood glucose (CONTOUR NEXT TEST) test strip, Use 1 each to test blood glucose  As Directed Daily at 8:00 am (Patient not taking: Reported on 11/28/2023), Disp: 200 strip, Rfl: 3    lisinopril (ZESTRIL) 10 MG tablet, Take 1 tablet (10 mg) by mouth daily, Disp: 90 tablet, Rfl: 3    Microlet Lancets MISC, Use 1 each to test blood glucose As Directed Daily at 8:00 am (Patient not taking: Reported on 11/28/2023), Disp: 100 each, Rfl: 11    ALLERGIES:  Reviewed independently by me.  No Known Allergies    FAMILY HISTORY:  Reviewed independently by me.  Family History   Problem Relation Age of Onset    Mental Illness Mother         depression and drugs/alcohol    Cerebrovascular Disease Father 94    CABG Father     Pulmonary Embolism Father     Osteoarthritis Father         hip replaced    Abdominal Aortic Aneurysm Father     Other - See Comments Maternal Aunt         cancer was found, then committed suicide    Dementia Maternal Aunt        SOCIAL HISTORY:   Reviewed independently by me.  Social History     Socioeconomic History    Marital  status:    Tobacco Use    Smoking status: Former     Packs/day: 1.00     Years: 27.00     Additional pack years: 0.00     Total pack years: 27.00     Types: Cigarettes     Start date:      Quit date: 2015     Years since quittin.8     Passive exposure: Never    Smokeless tobacco: Never    Tobacco comments:     no passive exposure   Vaping Use    Vaping Use: Never used   Substance and Sexual Activity    Alcohol use: No     Comment: Alcoholic Drinks/day: quit drinkning     Drug use: No   Social History Narrative    As of 2023         No kids  Has a puppy        Lives in [place]. 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)- has a slip in [place], [place] on the [address]        Brought in full Health Care Directive 2022 - it was sent to scan onto his chart     Social Determinants of Health     Financial Resource Strain: Low Risk  (10/20/2023)    Financial Resource Strain     Within the past 12 months, have you or your family members you live with been unable to get utilities (heat, electricity) when it was really needed?: No   Food Insecurity: Low Risk  (10/20/2023)    Food Insecurity     Within the past 12 months, did you worry that your food would run out before you got money to buy more?: No     Within the past 12 months, did the food you bought just not last and you didn t have money to get more?: No   Transportation Needs: Low Risk  (10/20/2023)    Transportation Needs     Within the past 12 months, has lack of transportation kept you from medical appointments, getting your medicines, non-medical meetings or appointments, work, or from getting things that you need?: No   Interpersonal Safety: Low Risk  (10/20/2023)    Interpersonal Safety     Do you feel physically and emotionally safe where you currently live?: Yes     Within the past 12 months, have you been hit, slapped, kicked or otherwise physically hurt by someone?: No     Within the past 12  "months, have you been humiliated or emotionally abused in other ways by your partner or ex-partner?: No   Housing Stability: Low Risk  (10/20/2023)    Housing Stability     Do you have housing? : Yes     Are you worried about losing your housing?: No       --------------- PHYSICAL EXAM ---------------  Nursing notes and vitals independently reviewed by me.  VITALS:  Vitals:    11/28/23 1922 11/28/23 2116   BP: (!) 156/81 135/66   Pulse: 75 57   Resp: 20 18   Temp: 97.5  F (36.4  C)    TempSrc: Oral    SpO2: 99% 96%   Weight: 88.5 kg (195 lb)    Height: 1.765 m (5' 9.5\")        PHYSICAL EXAM:    General:  alert, interactive, no distress  Eyes:  conjunctivae clear, conjugate gaze  HENT:  atraumatic, nose with no rhinorrhea, oropharynx clear  Neck:  no meningismus  Cardiovascular:  HR 70's during exam, regular rhythm, no murmurs, brisk cap refill  Chest:  no chest wall tenderness  Pulmonary:  no stridor, normal phonation, normal work of breathing, clear lungs bilaterally  Abdomen:  soft, nondistended, nontender, no abdominal tenderness whatsoever  :  no CVA tenderness  Back:  no midline spinal tenderness  Musculoskeletal:  no pretibial edema, no calf tenderness. Gross ROM intact to joints of extremities with no obvious deformities.  Skin:  warm, dry, no rash  Neuro:  awake, alert, answers questions appropriately, follows commands, moves all limbs  Psych:  calm, normal affect      --------------- RESULTS ---------------  LAB:  Reviewed and independently interpreted by me.  Results for orders placed or performed during the hospital encounter of 11/28/23   CT Abdomen Pelvis w Contrast    Impression    IMPRESSION:     1.  Findings favoring an adynamic small bowel ileus over a mechanical small bowel obstruction. There are multiple mildly dilated fluid-filled small bowel loops within the left upper quadrant with a gradual transition to normal caliber distally.    2.  Unchanged small right periumbilical ventral abdominal " hernia containing a nonobstructed small bowel loop. No acute complication.    3.  Colonic diverticulosis. No inflamed colonic diverticuli.   Basic metabolic panel   Result Value Ref Range    Sodium 139 135 - 145 mmol/L    Potassium 4.7 3.4 - 5.3 mmol/L    Chloride 101 98 - 107 mmol/L    Carbon Dioxide (CO2) 27 22 - 29 mmol/L    Anion Gap 11 7 - 15 mmol/L    Urea Nitrogen 23.7 (H) 8.0 - 23.0 mg/dL    Creatinine 1.15 0.67 - 1.17 mg/dL    GFR Estimate 69 >60 mL/min/1.73m2    Calcium 10.0 8.8 - 10.2 mg/dL    Glucose 149 (H) 70 - 99 mg/dL   Hepatic function panel   Result Value Ref Range    Protein Total 7.7 6.4 - 8.3 g/dL    Albumin 4.8 3.5 - 5.2 g/dL    Bilirubin Total 0.4 <=1.2 mg/dL    Alkaline Phosphatase 89 40 - 150 U/L    AST 24 0 - 45 U/L    ALT 13 0 - 70 U/L    Bilirubin Direct <0.20 0.00 - 0.30 mg/dL   Lactic acid whole blood   Result Value Ref Range    Lactic Acid 1.0 0.7 - 2.0 mmol/L   Result Value Ref Range    Lipase 47 13 - 60 U/L   Result Value Ref Range    CRP Inflammation <3.00 <5.00 mg/L   CBC with platelets and differential   Result Value Ref Range    WBC Count 12.0 (H) 4.0 - 11.0 10e3/uL    RBC Count 4.66 4.40 - 5.90 10e6/uL    Hemoglobin 12.8 (L) 13.3 - 17.7 g/dL    Hematocrit 39.2 (L) 40.0 - 53.0 %    MCV 84 78 - 100 fL    MCH 27.5 26.5 - 33.0 pg    MCHC 32.7 31.5 - 36.5 g/dL    RDW 14.8 10.0 - 15.0 %    Platelet Count 209 150 - 450 10e3/uL    % Neutrophils 89 %    % Lymphocytes 6 %    % Monocytes 5 %    % Eosinophils 0 %    % Basophils 0 %    % Immature Granulocytes 0 %    NRBCs per 100 WBC 0 <1 /100    Absolute Neutrophils 10.6 (H) 1.6 - 8.3 10e3/uL    Absolute Lymphocytes 0.8 0.8 - 5.3 10e3/uL    Absolute Monocytes 0.6 0.0 - 1.3 10e3/uL    Absolute Eosinophils 0.0 0.0 - 0.7 10e3/uL    Absolute Basophils 0.1 0.0 - 0.2 10e3/uL    Absolute Immature Granulocytes 0.1 <=0.4 10e3/uL    Absolute NRBCs 0.0 10e3/uL       RADIOLOGY:  Reviewed and independently interpreted by me. Please see official  radiology report.  Recent Results (from the past 24 hour(s))   XR Abdomen 2 Views    Addendum: 11/28/2023    Addendum for purposes of documentation of communication. Findings discussed verbally via telephone with HEIDY Uribe at at 6:50 PM on 11/28/2023.      Narrative    EXAM: XR ABDOMEN 2 VIEWS  LOCATION: [facility]  DATE: 11/28/2023    INDICATION: Generalized abdominal pain; previous small bowel obstructions.  COMPARISON: 11/02/2017.      Impression    IMPRESSION: There are a few loops of mildly dilated, gas-filled small bowel in the central and left hemiabdomen, which could be attributable to an underlying ileus or small bowel obstruction.    Mild-to-moderate diffuse colonic/rectal stool burden.    Hernia repair changes overlying the central abdomen.   CT Abdomen Pelvis w Contrast    Narrative    EXAM: CT ABDOMEN PELVIS W CONTRAST  LOCATION: [facility]  DATE: 11/28/2023    INDICATION: Abdominal pain and distention.  COMPARISON: CT abdomen pelvis 11/17/2023. Abdominal radiograph 11/28/2023.  TECHNIQUE: CT scan of the abdomen and pelvis was performed following injection of IV contrast. Multiplanar reformats were obtained. Dose reduction techniques were used.  CONTRAST: 90ml Isovue 370    FINDINGS:     LOWER CHEST: No consolidations or pleural effusions.    HEPATOBILIARY: Small cyst in the right hepatic lobe. No calcified gallstones or biliary ductal dilation.    PANCREAS: Normal.    SPLEEN: Normal.    ADRENAL GLANDS: Normal.    KIDNEYS/BLADDER: Multiple benign renal cortical cysts are unchanged and do not require follow-up. No urinary calculi or hydronephrosis. Normal bladder.    BOWEL: There are multiple mildly dilated fluid-filled small bowel loops within the left upper quadrant measuring up to 3.4 cm in caliber with a gradual transition to normal caliber distally, favoring an adynamic ileus over a mechanical small bowel   obstruction. No bowel wall thickening or  abnormal enhancement. Small right periumbilical hernia containing a nonobstructed small bowel loop appears similar to prior. Appendix is surgically absent. Previous sigmoid resection. Mild to moderate amount of   stool diffusely throughout the colon. Scattered noninflamed colonic diverticuli.    LYMPH NODES: No enlarged lymph node.    VASCULATURE: Patent portal, splenic, and superior mesenteric veins. Mild aortobiiliac atherosclerosis. No abdominal aortic aneurysm.    PELVIC ORGANS: Normal.    MUSCULOSKELETAL: Stable post surgical changes within the ventral abdominal wall. Small fat-containing right inguinal hernia. Multilevel degenerative changes of the spine. No acute bony abnormality or destructive bone lesions.      Impression    IMPRESSION:     1.  Findings favoring an adynamic small bowel ileus over a mechanical small bowel obstruction. There are multiple mildly dilated fluid-filled small bowel loops within the left upper quadrant with a gradual transition to normal caliber distally.    2.  Unchanged small right periumbilical ventral abdominal hernia containing a nonobstructed small bowel loop. No acute complication.    3.  Colonic diverticulosis. No inflamed colonic diverticuli.           --------------- ADDITIONAL MDM ---------------  History:  - I considered systemic symptoms of the presenting illness.  - Supplemental history from:       -- patient, family (wife)  - External Record(s) reviewed:       -- Inpatient/outpatient record (clinic visit 11/28/23), prior labs (blood 10/18/23), prior imaging (X-rays abdomen 11/28/23)       -- see above ED course & MDM for further details    Workup:  - Chart documentation above includes differential considered and any EKGs or imaging independently interpreted by provider.  - In additional to work up documented, I considered the following work up:       -- see above ED course & MDM for further details    External Consultation:  - Discussion of management with another  provider:       -- see above ED course & MDM for details    Complicating Factors:  - Care impacted by chronic illness:       -- see above MDM, past medical history, & problem list  - Care affected by social determinants of health:       -- see above social history       -- Access to Affordable Healthcare    Disposition Considerations:  - Discharge       -- I considered escalation of care with admission to the hospital, but ultimately discharged the patient per decision making above       -- I recommended the patient continue their current prescription strength medication(s) as charted above in current medications list       -- I prescribed prescription strength medication(s) as charted above       -- I recommended over-the-counter medication(s) as charted above & in discharge instructions         I, Ashwini Her, am serving as a scribe to document services personally performed by Dr. Guanaco Gusman based on my observation and the provider's statements to me. I, Guanaco Gusman MD attest that Ashwini Her is acting in a scribe capacity, has observed my performance of the services and has documented them in accordance with my direction.      Guanaco Gusman MD  11/28/23  Emergency Medicine  [facility] EMERGENCY ROOM  [address] [address]  [phone]  Dept: [phone]     Guanaco Gusman MD  11/28/23 8680

## 2023-11-29 NOTE — PATIENT INSTRUCTIONS
Not eat or drink anything by mouth  Present to the emergency room for evaluation and treatment

## 2023-11-29 NOTE — ED NOTES
Expected Patient Referral to ED  6:55 PM    Referring Clinic/Provider:  urgent    Reason for referral/Clinical facts:  SBO on plain x-ray.  History of SBO    Recommendations provided:  Send to ED for further evaluation    Caller was informed that this institution does possess the capabilities and/or resources to provide for patient and should be transferred to our facility.    Discussed that if direct admit is sought and any hurdles are encountered, this ED would be happy to see the patient and evaluate.    Informed caller that recommendations provided are recommendations based only on the facts provided and that they responsible to accept or reject the advice, or to seek a formal in person consultation as needed and that this ED will see/treat patient should they arrive.      Porter De Jesus DO  [facility] EMERGENCY ROOM  [address] [address] [phone]       Porter De Jesus DO  11/28/23 3198

## 2023-12-01 ENCOUNTER — OFFICE VISIT (OUTPATIENT)
Dept: FAMILY MEDICINE | Facility: CLINIC | Age: 68
End: 2023-12-01
Payer: COMMERCIAL

## 2023-12-01 VITALS
HEART RATE: 48 BPM | BODY MASS INDEX: 29.36 KG/M2 | HEIGHT: 69 IN | WEIGHT: 198.25 LBS | RESPIRATION RATE: 16 BRPM | DIASTOLIC BLOOD PRESSURE: 74 MMHG | SYSTOLIC BLOOD PRESSURE: 151 MMHG | TEMPERATURE: 97.8 F | OXYGEN SATURATION: 98 %

## 2023-12-01 DIAGNOSIS — K43.9 VENTRAL HERNIA WITHOUT OBSTRUCTION OR GANGRENE: ICD-10-CM

## 2023-12-01 DIAGNOSIS — E11.8 TYPE 2 DIABETES MELLITUS WITH COMPLICATION, WITHOUT LONG-TERM CURRENT USE OF INSULIN (H): Chronic | ICD-10-CM

## 2023-12-01 DIAGNOSIS — Z00.00 PREVENTATIVE HEALTH CARE: ICD-10-CM

## 2023-12-01 DIAGNOSIS — Z01.818 PREOP GENERAL PHYSICAL EXAM: Primary | ICD-10-CM

## 2023-12-01 DIAGNOSIS — Z86.711 HISTORY OF PULMONARY EMBOLISM: Chronic | ICD-10-CM

## 2023-12-01 DIAGNOSIS — N40.0 BENIGN PROSTATIC HYPERPLASIA WITHOUT LOWER URINARY TRACT SYMPTOMS: ICD-10-CM

## 2023-12-01 DIAGNOSIS — I10 BENIGN ESSENTIAL HTN: ICD-10-CM

## 2023-12-01 DIAGNOSIS — F41.1 ANXIETY STATE: Chronic | ICD-10-CM

## 2023-12-01 DIAGNOSIS — Z87.19 H/O SMALL BOWEL OBSTRUCTION: ICD-10-CM

## 2023-12-01 LAB
ATRIAL RATE - MUSE: 45 BPM
DIASTOLIC BLOOD PRESSURE - MUSE: NORMAL MMHG
INTERPRETATION ECG - MUSE: NORMAL
P AXIS - MUSE: 47 DEGREES
PR INTERVAL - MUSE: 184 MS
QRS DURATION - MUSE: 110 MS
QT - MUSE: 468 MS
QTC - MUSE: 404 MS
R AXIS - MUSE: 18 DEGREES
SYSTOLIC BLOOD PRESSURE - MUSE: NORMAL MMHG
T AXIS - MUSE: 28 DEGREES
VENTRICULAR RATE- MUSE: 45 BPM

## 2023-12-01 PROCEDURE — 99215 OFFICE O/P EST HI 40 MIN: CPT | Mod: 25 | Performed by: FAMILY MEDICINE

## 2023-12-01 PROCEDURE — 93010 ELECTROCARDIOGRAM REPORT: CPT | Performed by: INTERNAL MEDICINE

## 2023-12-01 PROCEDURE — 93005 ELECTROCARDIOGRAM TRACING: CPT | Performed by: FAMILY MEDICINE

## 2023-12-01 NOTE — PROGRESS NOTES
[address]  [place] [address]  Phone: [phone]  Fax: [phone]  Primary Provider: Milly Ng  Pre-op Performing Provider: MILLY NG    PREOPERATIVE EVALUATION:  Today's date: 12/1/2023    Juan is a 68 year old, presenting for the following:  Pre-Op Exam (Pre op)        12/1/2023    10:15 AM   Additional Questions   Roomed by Oscar ELIAS   Accompanied by Self       Surgical Information:  Surgery/Procedure: HERNIORRHAPHY, INCISIONAL, ROBOT-ASSISTED, LAPAROSCOPIC, USING DA BABATUNDE XI with bilateral transversus abdominus release   Surgery Location: [facility]  Surgeon: Bon Espinoza,    Surgery Date: 12/12/2023   Time of Surgery: 7:20 AM   Where patient plans to recover: At home with family  Fax number for surgical facility:  [phone]    Assessment & Plan     The proposed surgical procedure is considered LOW risk.    Preop general physical exam  Completed. He is cleared for surgery  - EKG 12-lead, tracing only    Ventral hernia without obstruction or gangrene  Reason for the surgery    Type 2 diabetes mellitus with complication, without long-term current use of insulin (H)  More recent A1-C 6.2. this will not likely cause a big problem for the surgery or healing.    History of pulmonary embolism  He is not on a long term anti-coagulant    Anxiety  While he has anxiety, he is not particularly worried about this surgery    Benign prostatic hyperplasia without lower urinary tract symptoms  Not a problem that should affect his surgery    H/O small bowel obstruction  This has resolved, and it resolved on its own, but was a recent scare    Benign essential HTN  He did not take his lisinopril today, which explains why his BP is high. He will resume it.    Anemia  Improved with being on iron. He will stop the iron at this time. At his follow up appointment in Feb, we will see if he needs to resume the iron.    Preventative health  "care  reviewed  - REVIEW OF HEALTH MAINTENANCE PROTOCOL ORDERS     - No identified additional risk factors other than previously addressed    Antiplatelet or Anticoagulation Medication Instructions:   - Patient is on no antiplatelet or anticoagulation medications.    Additional Medication Instructions:  He will take his lisinopril before 5am the day of surgery. The statin is a at bedtime med    RECOMMENDATION:  APPROVAL GIVEN to proceed with proposed procedure, without further diagnostic evaluation.    Review of external notes as documented elsewhere in note  Ordering of each unique test  Prescription drug management  40 minutes spent by me on the date of the encounter doing chart review, history and exam, documentation and further activities per the note              Subjective     HPI related to upcoming procedure: he has a hernia protruding from his belly; there is no pain; his wife really pushed him to get it fixed to avoid any risk of intestinal strangulation. He recently suffered an ileus and realized he wants the hernia fixed. He is ready for surgery.    The day of the ileus, prior to it happening, he let his metamucil mixture sit a little longer than usual. When he drank it, it was much more \"swollen up\" and more solid.  Was just in ER with ileus - that has resolved. Cut out metamucil. Cut out iron. Poops have been good.  Breakfast - yogurt, berries, etc  Gets lots of fresh veggies. He wants to know if he still needs iron or metamucil.          12/1/2023    10:11 AM   Preop Questions   1. Have you ever had a heart attack or stroke? No   2. Have you ever had surgery on your heart or blood vessels, such as a stent placement, a coronary artery bypass, or surgery on an artery in your head, neck, heart, or legs? No   3. Do you have chest pain with activity? No   4. Do you have a history of  heart failure? No   5. Do you currently have a cold, bronchitis or symptoms of other infection? No   6. Do you have a cough, " shortness of breath, or wheezing? No   7. Do you or anyone in your family have previous history of blood clots? UNKNOWN - no   8. Do you or does anyone in your family have a serious bleeding problem such as prolonged bleeding following surgeries or cuts? UNKNOWN - no   9. Have you ever had problems with anemia or been told to take iron pills? YES - current   10. Have you had any abnormal blood loss such as black, tarry or bloody stools? No   11. Have you ever had a blood transfusion? No   12. Are you willing to have a blood transfusion if it is medically needed before, during, or after your surgery? Yes   13. Have you or any of your relatives ever had problems with anesthesia? No   14. Do you have sleep apnea, excessive snoring or daytime drowsiness? No   15. Do you have any artifical heart valves or other implanted medical devices like a pacemaker, defibrillator, or continuous glucose monitor? No   16. Do you have artificial joints? No   17. Are you allergic to latex? No       Health Care Directive:  Patient has a Health Care Directive on file      Preoperative Review of :   reviewed - he had narcotics prescribed due to his ileus.      Review of Systems  Occasional unusual urinary symptoms during the night - feels the need to urinate but can't    Patient Active Problem List    Diagnosis Date Noted    H/O small bowel obstruction 06/28/2022     Priority: Medium    Onychomycosis 12/23/2021     Priority: Medium    Heart murmur 05/07/2021     Priority: Medium    Bunion, left 05/28/2019     Priority: Medium    Benign essential HTN      Priority: Medium    Dyslipidemia      Priority: Medium    History of pulmonary embolism      Priority: Medium     Created by Conversion      Formatting of this note might be different from the original.  Created by Conversion      Type 2 diabetes mellitus with complication, without long-term current use of insulin (H) 02/28/2017     Priority: Medium    Hyperosmolarity due to  secondary diabetes mellitus (H) 02/28/2017     Priority: Medium    SINDI (acute kidney injury) (H24) 02/28/2017     Priority: Medium    Hyponatremia 02/28/2017     Priority: Medium    Anxiety      Priority: Medium     Created by Conversion  Replacement Utility updated for latest IMO load      Formatting of this note might be different from the original.  Created by Conversion    Replacement Utility updated for latest IMO load      Alcohol abuse      Priority: Medium     Created by Conversion  Replacement Utility updated for latest IMO load      Formatting of this note might be different from the original.  Created by Conversion    Replacement Utility updated for latest IMO load      Acrochordon      Priority: Medium     Created by Conversion  Replacement Utility updated for latest IMO load      Formatting of this note might be different from the original.  Created by Conversion    Replacement Utility updated for latest IMO load      Benign prostatic hyperplasia      Priority: Medium     Created by Conversion      Formatting of this note might be different from the original.  Created by Conversion        Past Medical History:   Diagnosis Date    Alcohol abuse     Colorectal polyps 02/01/2020    none high grade; repeat colonoscopy in 3 yrs - next 2/2026    Diabetes mellitus (H)     Hernia 09/07/2017    Ventral    Hypertension     Influenza     PE (pulmonary embolism)     Perforated bowel (H)      Past Surgical History:   Procedure Laterality Date    ABDOMEN SURGERY      Colen resection    HERNIORRHAPHY VENTRAL N/A 11/3/2017    Procedure: REPAIR, HERNIA, VENTRAL, OPEN;  Surgeon: Maxine Rivera MD;  Location: [facility];  Service:     [facility] APPENDECTOMY      Description: Appendectomy;  Recorded: 07/30/2008;    [facility] EXPLORATORY OF ABDOMEN N/A 11/3/2017    Procedure: LAPAROTOMY, LYSIS OF ADHESIONS, MESH EXPLANTATION VENTRAL HERNIA REPAIR WITH MESH ;  Surgeon: Maxine Rivera MD;  Location: [facility];   Service: General    [facility] REPAIR INCISIONAL HERNIA,REDUCIBLE      Description: Ventral Hernia Repair;  Recorded: 2008;    [facility] REPAIR UMBILICAL ERAN,<4Y/O,REDUC      Description: Umbilical Hernia Repair;  Recorded: 2008;     Current Outpatient Medications   Medication Sig Dispense Refill    atorvastatin (LIPITOR) 10 MG tablet Take 1 tablet (10 mg total) by mouth daily. 90 tablet 4    blood glucose (CONTOUR NEXT TEST) test strip Use 1 each to test blood glucose  As Directed Daily at 8:00 am 200 strip 3    Microlet Lancets MISC Use 1 each to test blood glucose As Directed Daily at 8:00 am 100 each 11    dicyclomine (BENTYL) 20 MG tablet Take 1 tablet (20 mg) by mouth 4 times daily as needed (abdominal pain) (Patient not taking: Reported on 2023) 20 tablet 0    lisinopril (ZESTRIL) 10 MG tablet Take 1 tablet (10 mg) by mouth daily (Patient not taking: Reported on 2023) 90 tablet 3    ondansetron (ZOFRAN ODT) 4 MG ODT tab Take 1 tablet (4 mg) by mouth every 8 hours as needed (Patient not taking: Reported on 2023) 20 tablet 0       No Known Allergies     Social History     Tobacco Use    Smoking status: Former     Packs/day: 1.00     Years: 27.00     Additional pack years: 0.00     Total pack years: 27.00     Types: Cigarettes     Start date:      Quit date: 2015     Years since quittin.8     Passive exposure: Never    Smokeless tobacco: Never    Tobacco comments:     no passive exposure   Substance Use Topics    Alcohol use: No     Comment: Alcoholic Drinks/day: quit drinkning      Family History   Problem Relation Age of Onset    Mental Illness Mother         depression and drugs/alcohol    Cerebrovascular Disease Father 94    CABG Father     Pulmonary Embolism Father     Osteoarthritis Father         hip replaced    Abdominal Aortic Aneurysm Father     Other - See Comments Maternal Aunt         cancer was found, then committed suicide    Dementia Maternal Aunt      History  "  Drug Use No         Objective     BP (!) 150/69   Pulse 66   Temp 97.8  F (36.6  C) (Oral)   Resp 16   Ht 1.765 m (5' 9.49\")   Wt 89.9 kg (198 lb 4 oz)   BMI 28.87 kg/m      Physical Exam    GENERAL APPEARANCE: healthy, alert and no distress     EYES: EOMI,  PERRL     HENT: ear canals and TM's normal and nose and mouth without ulcers or lesions     NECK: no adenopathy, no asymmetry, masses, or scars and thyroid normal to palpation     RESP: lungs clear to auscultation - no rales, rhonchi or wheezes     CV: normal S1 S2, no S3 or S4     ABDOMEN: bowel sounds normal, soft, ND/NT, a few     MS: extremities normal- no gross deformities noted, no evidence of inflammation in joints, FROM in all extremities.     SKIN: no suspicious lesions or rashes     NEURO: Normal strength and tone, sensory exam grossly normal, mentation intact and speech normal     PSYCH: mentation appears normal. and affect normal/bright     LYMPHATICS: No cervical adenopathy    Recent Labs   Lab Test 11/28/23  1935 10/18/23  1132 06/06/23  0755   HGB 12.8* 12.0* 12.2*    200 199    137 140   POTASSIUM 4.7 4.5 4.6   CR 1.15 0.95 1.18*   A1C  --  6.2* 5.6      Additional labs were done in the ER when he had an ileus    Diagnostics:  Recent Results (from the past 24 hour(s))   EKG 12-lead, tracing only    Collection Time: 12/01/23 12:47 PM   Result Value Ref Range    Systolic Blood Pressure  mmHg    Diastolic Blood Pressure  mmHg    Ventricular Rate 45 BPM    Atrial Rate 45 BPM    GA Interval 184 ms    QRS Duration 110 ms     ms    QTc 404 ms    P Axis 47 degrees    R AXIS 18 degrees    T Axis 28 degrees    Interpretation ECG       Sinus bradycardia  Otherwise normal ECG  When compared with ECG of 28-JUN-2022 08:54,  No significant change was found  Confirmed by RAGHU BAIRES MD LOC:JOE ([id number]) on 12/1/2023 4:02:03 PM        EKG: sinus bradycardia, normal axis, normal intervals, no acute ST/T changes c/w ischemia, no LVH by " voltage criteria, unchanged from previous tracings    Revised Cardiac Risk Index (RCRI):  The patient has the following serious cardiovascular risks for perioperative complications:   - No serious cardiac risks = 0 points     RCRI Interpretation: 0 points: Class I (very low risk - 0.4% complication rate)         Signed Electronically by: Martine Ng MD  Copy of this evaluation report is provided to requesting physician.

## 2023-12-01 NOTE — H&P (VIEW-ONLY)
[address]  [place] [address]  Phone: [phone]  Fax: [phone]  Primary Provider: Milly Ng  Pre-op Performing Provider: MILLY NG    PREOPERATIVE EVALUATION:  Today's date: 12/1/2023    Juan is a 68 year old, presenting for the following:  Pre-Op Exam (Pre op)        12/1/2023    10:15 AM   Additional Questions   Roomed by Oscar ELIAS   Accompanied by Self       Surgical Information:  Surgery/Procedure: HERNIORRHAPHY, INCISIONAL, ROBOT-ASSISTED, LAPAROSCOPIC, USING DA BABATUNDE XI with bilateral transversus abdominus release   Surgery Location: [facility]  Surgeon: Bon Espinoza,    Surgery Date: 12/12/2023   Time of Surgery: 7:20 AM   Where patient plans to recover: At home with family  Fax number for surgical facility:  [phone]    Assessment & Plan     The proposed surgical procedure is considered LOW risk.    Preop general physical exam  Completed. He is cleared for surgery  - EKG 12-lead, tracing only    Ventral hernia without obstruction or gangrene  Reason for the surgery    Type 2 diabetes mellitus with complication, without long-term current use of insulin (H)  More recent A1-C 6.2. this will not likely cause a big problem for the surgery or healing.    History of pulmonary embolism  He is not on a long term anti-coagulant    Anxiety  While he has anxiety, he is not particularly worried about this surgery    Benign prostatic hyperplasia without lower urinary tract symptoms  Not a problem that should affect his surgery    H/O small bowel obstruction  This has resolved, and it resolved on its own, but was a recent scare    Benign essential HTN  He did not take his lisinopril today, which explains why his BP is high. He will resume it.    Anemia  Improved with being on iron. He will stop the iron at this time. At his follow up appointment in Feb, we will see if he needs to resume the iron.    Preventative health  "care  reviewed  - REVIEW OF HEALTH MAINTENANCE PROTOCOL ORDERS     - No identified additional risk factors other than previously addressed    Antiplatelet or Anticoagulation Medication Instructions:   - Patient is on no antiplatelet or anticoagulation medications.    Additional Medication Instructions:  He will take his lisinopril before 5am the day of surgery. The statin is a at bedtime med    RECOMMENDATION:  APPROVAL GIVEN to proceed with proposed procedure, without further diagnostic evaluation.    Review of external notes as documented elsewhere in note  Ordering of each unique test  Prescription drug management  40 minutes spent by me on the date of the encounter doing chart review, history and exam, documentation and further activities per the note              Subjective     HPI related to upcoming procedure: he has a hernia protruding from his belly; there is no pain; his wife really pushed him to get it fixed to avoid any risk of intestinal strangulation. He recently suffered an ileus and realized he wants the hernia fixed. He is ready for surgery.    The day of the ileus, prior to it happening, he let his metamucil mixture sit a little longer than usual. When he drank it, it was much more \"swollen up\" and more solid.  Was just in ER with ileus - that has resolved. Cut out metamucil. Cut out iron. Poops have been good.  Breakfast - yogurt, berries, etc  Gets lots of fresh veggies. He wants to know if he still needs iron or metamucil.          12/1/2023    10:11 AM   Preop Questions   1. Have you ever had a heart attack or stroke? No   2. Have you ever had surgery on your heart or blood vessels, such as a stent placement, a coronary artery bypass, or surgery on an artery in your head, neck, heart, or legs? No   3. Do you have chest pain with activity? No   4. Do you have a history of  heart failure? No   5. Do you currently have a cold, bronchitis or symptoms of other infection? No   6. Do you have a cough, " shortness of breath, or wheezing? No   7. Do you or anyone in your family have previous history of blood clots? UNKNOWN - no   8. Do you or does anyone in your family have a serious bleeding problem such as prolonged bleeding following surgeries or cuts? UNKNOWN - no   9. Have you ever had problems with anemia or been told to take iron pills? YES - current   10. Have you had any abnormal blood loss such as black, tarry or bloody stools? No   11. Have you ever had a blood transfusion? No   12. Are you willing to have a blood transfusion if it is medically needed before, during, or after your surgery? Yes   13. Have you or any of your relatives ever had problems with anesthesia? No   14. Do you have sleep apnea, excessive snoring or daytime drowsiness? No   15. Do you have any artifical heart valves or other implanted medical devices like a pacemaker, defibrillator, or continuous glucose monitor? No   16. Do you have artificial joints? No   17. Are you allergic to latex? No       Health Care Directive:  Patient has a Health Care Directive on file      Preoperative Review of :   reviewed - he had narcotics prescribed due to his ileus.      Review of Systems  Occasional unusual urinary symptoms during the night - feels the need to urinate but can't    Patient Active Problem List    Diagnosis Date Noted    H/O small bowel obstruction 06/28/2022     Priority: Medium    Onychomycosis 12/23/2021     Priority: Medium    Heart murmur 05/07/2021     Priority: Medium    Bunion, left 05/28/2019     Priority: Medium    Benign essential HTN      Priority: Medium    Dyslipidemia      Priority: Medium    History of pulmonary embolism      Priority: Medium     Created by Conversion      Formatting of this note might be different from the original.  Created by Conversion      Type 2 diabetes mellitus with complication, without long-term current use of insulin (H) 02/28/2017     Priority: Medium    Hyperosmolarity due to  secondary diabetes mellitus (H) 02/28/2017     Priority: Medium    SINDI (acute kidney injury) (H24) 02/28/2017     Priority: Medium    Hyponatremia 02/28/2017     Priority: Medium    Anxiety      Priority: Medium     Created by Conversion  Replacement Utility updated for latest IMO load      Formatting of this note might be different from the original.  Created by Conversion    Replacement Utility updated for latest IMO load      Alcohol abuse      Priority: Medium     Created by Conversion  Replacement Utility updated for latest IMO load      Formatting of this note might be different from the original.  Created by Conversion    Replacement Utility updated for latest IMO load      Acrochordon      Priority: Medium     Created by Conversion  Replacement Utility updated for latest IMO load      Formatting of this note might be different from the original.  Created by Conversion    Replacement Utility updated for latest IMO load      Benign prostatic hyperplasia      Priority: Medium     Created by Conversion      Formatting of this note might be different from the original.  Created by Conversion        Past Medical History:   Diagnosis Date    Alcohol abuse     Colorectal polyps 02/01/2020    none high grade; repeat colonoscopy in 3 yrs - next 2/2026    Diabetes mellitus (H)     Hernia 09/07/2017    Ventral    Hypertension     Influenza     PE (pulmonary embolism)     Perforated bowel (H)      Past Surgical History:   Procedure Laterality Date    ABDOMEN SURGERY      Colen resection    HERNIORRHAPHY VENTRAL N/A 11/3/2017    Procedure: REPAIR, HERNIA, VENTRAL, OPEN;  Surgeon: Maxine Rivera MD;  Location: [facility] [place];  Service:     [facility] APPENDECTOMY      Description: Appendectomy;  Recorded: 07/30/2008;    [facility] EXPLORATORY OF ABDOMEN N/A 11/3/2017    Procedure: LAPAROTOMY, LYSIS OF ADHESIONS, MESH EXPLANTATION VENTRAL HERNIA REPAIR WITH MESH ;  Surgeon: Maxine Rivera MD;  Location: [facility] [place];   Service: General    [facility] REPAIR INCISIONAL HERNIA,REDUCIBLE      Description: Ventral Hernia Repair;  Recorded: 2008;    [facility] REPAIR UMBILICAL ERAN,<4Y/O,REDUC      Description: Umbilical Hernia Repair;  Recorded: 2008;     Current Outpatient Medications   Medication Sig Dispense Refill    atorvastatin (LIPITOR) 10 MG tablet Take 1 tablet (10 mg total) by mouth daily. 90 tablet 4    blood glucose (CONTOUR NEXT TEST) test strip Use 1 each to test blood glucose  As Directed Daily at 8:00 am 200 strip 3    Microlet Lancets MISC Use 1 each to test blood glucose As Directed Daily at 8:00 am 100 each 11    dicyclomine (BENTYL) 20 MG tablet Take 1 tablet (20 mg) by mouth 4 times daily as needed (abdominal pain) (Patient not taking: Reported on 2023) 20 tablet 0    lisinopril (ZESTRIL) 10 MG tablet Take 1 tablet (10 mg) by mouth daily (Patient not taking: Reported on 2023) 90 tablet 3    ondansetron (ZOFRAN ODT) 4 MG ODT tab Take 1 tablet (4 mg) by mouth every 8 hours as needed (Patient not taking: Reported on 2023) 20 tablet 0       No Known Allergies     Social History     Tobacco Use    Smoking status: Former     Packs/day: 1.00     Years: 27.00     Additional pack years: 0.00     Total pack years: 27.00     Types: Cigarettes     Start date:      Quit date: 2015     Years since quittin.8     Passive exposure: Never    Smokeless tobacco: Never    Tobacco comments:     no passive exposure   Substance Use Topics    Alcohol use: No     Comment: Alcoholic Drinks/day: quit drinkning      Family History   Problem Relation Age of Onset    Mental Illness Mother         depression and drugs/alcohol    Cerebrovascular Disease Father 94    CABG Father     Pulmonary Embolism Father     Osteoarthritis Father         hip replaced    Abdominal Aortic Aneurysm Father     Other - See Comments Maternal Aunt         cancer was found, then committed suicide    Dementia Maternal Aunt      History  "  Drug Use No         Objective     BP (!) 150/69   Pulse 66   Temp 97.8  F (36.6  C) (Oral)   Resp 16   Ht 1.765 m (5' 9.49\")   Wt 89.9 kg (198 lb 4 oz)   BMI 28.87 kg/m      Physical Exam    GENERAL APPEARANCE: healthy, alert and no distress     EYES: EOMI,  PERRL     HENT: ear canals and TM's normal and nose and mouth without ulcers or lesions     NECK: no adenopathy, no asymmetry, masses, or scars and thyroid normal to palpation     RESP: lungs clear to auscultation - no rales, rhonchi or wheezes     CV: normal S1 S2, no S3 or S4     ABDOMEN: bowel sounds normal, soft, ND/NT, a few     MS: extremities normal- no gross deformities noted, no evidence of inflammation in joints, FROM in all extremities.     SKIN: no suspicious lesions or rashes     NEURO: Normal strength and tone, sensory exam grossly normal, mentation intact and speech normal     PSYCH: mentation appears normal. and affect normal/bright     LYMPHATICS: No cervical adenopathy    Recent Labs   Lab Test 11/28/23  1935 10/18/23  1132 06/06/23  0755   HGB 12.8* 12.0* 12.2*    200 199    137 140   POTASSIUM 4.7 4.5 4.6   CR 1.15 0.95 1.18*   A1C  --  6.2* 5.6      Additional labs were done in the ER when he had an ileus    Diagnostics:  Recent Results (from the past 24 hour(s))   EKG 12-lead, tracing only    Collection Time: 12/01/23 12:47 PM   Result Value Ref Range    Systolic Blood Pressure  mmHg    Diastolic Blood Pressure  mmHg    Ventricular Rate 45 BPM    Atrial Rate 45 BPM    IN Interval 184 ms    QRS Duration 110 ms     ms    QTc 404 ms    P Axis 47 degrees    R AXIS 18 degrees    T Axis 28 degrees    Interpretation ECG       Sinus bradycardia  Otherwise normal ECG  When compared with ECG of 28-JUN-2022 08:54,  No significant change was found  Confirmed by RAGHU BAIRES MD LOC:JOE ([id number]) on 12/1/2023 4:02:03 PM        EKG: sinus bradycardia, normal axis, normal intervals, no acute ST/T changes c/w ischemia, no LVH by " voltage criteria, unchanged from previous tracings    Revised Cardiac Risk Index (RCRI):  The patient has the following serious cardiovascular risks for perioperative complications:   - No serious cardiac risks = 0 points     RCRI Interpretation: 0 points: Class I (very low risk - 0.4% complication rate)         Signed Electronically by: Martine Ng MD  Copy of this evaluation report is provided to requesting physician.

## 2023-12-11 ENCOUNTER — ANESTHESIA EVENT (OUTPATIENT)
Dept: SURGERY | Facility: HOSPITAL | Age: 68
DRG: 335 | End: 2023-12-11
Payer: COMMERCIAL

## 2023-12-12 ENCOUNTER — ANESTHESIA (OUTPATIENT)
Dept: SURGERY | Facility: HOSPITAL | Age: 68
DRG: 335 | End: 2023-12-12
Payer: COMMERCIAL

## 2023-12-12 ENCOUNTER — HOSPITAL ENCOUNTER (INPATIENT)
Facility: HOSPITAL | Age: 68
LOS: 1 days | Discharge: HOME | DRG: 335 | End: 2023-12-13
Attending: SURGERY | Admitting: SURGERY
Payer: COMMERCIAL

## 2023-12-12 DIAGNOSIS — Z87.19 HISTORY OF INCISIONAL HERNIA REPAIR: Primary | ICD-10-CM

## 2023-12-12 DIAGNOSIS — Z98.890 HISTORY OF INCISIONAL HERNIA REPAIR: Primary | ICD-10-CM

## 2023-12-12 LAB
CREAT SERPL-MCNC: 1.2 MG/DL (ref 0.67–1.17)
EGFRCR SERPLBLD CKD-EPI 2021: 65 ML/MIN/1.73M2
GLUCOSE BLDC GLUCOMTR-MCNC: 153 MG/DL (ref 70–99)
GLUCOSE BLDC GLUCOMTR-MCNC: 155 MG/DL (ref 70–99)
GLUCOSE BLDC GLUCOMTR-MCNC: 97 MG/DL (ref 70–99)
HOLD SPECIMEN: NORMAL
PLATELET # BLD AUTO: 174 10E3/UL (ref 150–450)

## 2023-12-12 PROCEDURE — 250N000013 HC RX MED GY IP 250 OP 250 PS 637: Performed by: ANESTHESIOLOGY

## 2023-12-12 PROCEDURE — 8E0W4CZ ROBOTIC ASSISTED PROCEDURE OF TRUNK REGION, PERCUTANEOUS ENDOSCOPIC APPROACH: ICD-10-PCS | Performed by: SURGERY

## 2023-12-12 PROCEDURE — 258N000003 HC RX IP 258 OP 636: Performed by: ANESTHESIOLOGY

## 2023-12-12 PROCEDURE — 370N000017 HC ANESTHESIA TECHNICAL FEE, PER MIN: Performed by: SURGERY

## 2023-12-12 PROCEDURE — 250N000011 HC RX IP 250 OP 636: Performed by: SURGERY

## 2023-12-12 PROCEDURE — 0WUF4JZ SUPPLEMENT ABDOMINAL WALL WITH SYNTHETIC SUBSTITUTE, PERCUTANEOUS ENDOSCOPIC APPROACH: ICD-10-PCS | Performed by: SURGERY

## 2023-12-12 PROCEDURE — 49618 RPR AA HRN RCR > 10 NCR/STRN: CPT | Mod: 22 | Performed by: SURGERY

## 2023-12-12 PROCEDURE — 250N000013 HC RX MED GY IP 250 OP 250 PS 637: Performed by: SURGERY

## 2023-12-12 PROCEDURE — 36415 COLL VENOUS BLD VENIPUNCTURE: CPT | Performed by: SURGERY

## 2023-12-12 PROCEDURE — C1781 MESH (IMPLANTABLE): HCPCS | Performed by: SURGERY

## 2023-12-12 PROCEDURE — 250N000011 HC RX IP 250 OP 636: Mod: JZ | Performed by: NURSE ANESTHETIST, CERTIFIED REGISTERED

## 2023-12-12 PROCEDURE — 999N000141 HC STATISTIC PRE-PROCEDURE NURSING ASSESSMENT: Performed by: SURGERY

## 2023-12-12 PROCEDURE — 250N000011 HC RX IP 250 OP 636: Performed by: ANESTHESIOLOGY

## 2023-12-12 PROCEDURE — C9290 INJ, BUPIVACAINE LIPOSOME: HCPCS | Performed by: SURGERY

## 2023-12-12 PROCEDURE — 120N000001 HC R&B MED SURG/OB

## 2023-12-12 PROCEDURE — 272N000001 HC OR GENERAL SUPPLY STERILE: Performed by: SURGERY

## 2023-12-12 PROCEDURE — 710N000009 HC RECOVERY PHASE 1, LEVEL 1, PER MIN: Performed by: SURGERY

## 2023-12-12 PROCEDURE — S2900 ROBOTIC SURGICAL SYSTEM: HCPCS | Performed by: SURGERY

## 2023-12-12 PROCEDURE — 360N000080 HC SURGERY LEVEL 7, PER MIN: Performed by: SURGERY

## 2023-12-12 PROCEDURE — 250N000025 HC SEVOFLURANE, PER MIN: Performed by: SURGERY

## 2023-12-12 PROCEDURE — 82565 ASSAY OF CREATININE: CPT | Performed by: SURGERY

## 2023-12-12 PROCEDURE — 0KNL4ZZ RELEASE LEFT ABDOMEN MUSCLE, PERCUTANEOUS ENDOSCOPIC APPROACH: ICD-10-PCS | Performed by: SURGERY

## 2023-12-12 PROCEDURE — 0KNK4ZZ RELEASE RIGHT ABDOMEN MUSCLE, PERCUTANEOUS ENDOSCOPIC APPROACH: ICD-10-PCS | Performed by: SURGERY

## 2023-12-12 PROCEDURE — 250N000011 HC RX IP 250 OP 636: Mod: JZ | Performed by: ANESTHESIOLOGY

## 2023-12-12 PROCEDURE — 250N000009 HC RX 250: Performed by: NURSE ANESTHETIST, CERTIFIED REGISTERED

## 2023-12-12 PROCEDURE — 0DN84ZZ RELEASE SMALL INTESTINE, PERCUTANEOUS ENDOSCOPIC APPROACH: ICD-10-PCS | Performed by: SURGERY

## 2023-12-12 PROCEDURE — 85049 AUTOMATED PLATELET COUNT: CPT | Performed by: SURGERY

## 2023-12-12 DEVICE — MACROPOROUS MESH, MONOFILAMENT POLYPROPYLENE
Type: IMPLANTABLE DEVICE | Site: ABDOMEN | Status: FUNCTIONAL
Brand: PARIETENE

## 2023-12-12 RX ORDER — ONDANSETRON 4 MG/1
4 TABLET, ORALLY DISINTEGRATING ORAL EVERY 6 HOURS PRN
Status: DISCONTINUED | OUTPATIENT
Start: 2023-12-12 | End: 2023-12-13 | Stop reason: HOSPADM

## 2023-12-12 RX ORDER — ENOXAPARIN SODIUM 100 MG/ML
40 INJECTION SUBCUTANEOUS EVERY 24 HOURS
Status: DISCONTINUED | OUTPATIENT
Start: 2023-12-13 | End: 2023-12-13 | Stop reason: HOSPADM

## 2023-12-12 RX ORDER — ONDANSETRON 2 MG/ML
4 INJECTION INTRAMUSCULAR; INTRAVENOUS EVERY 30 MIN PRN
Status: DISCONTINUED | OUTPATIENT
Start: 2023-12-12 | End: 2023-12-12 | Stop reason: HOSPADM

## 2023-12-12 RX ORDER — METHOCARBAMOL 750 MG/1
750 TABLET, FILM COATED ORAL ONCE
Status: COMPLETED | OUTPATIENT
Start: 2023-12-12 | End: 2023-12-12

## 2023-12-12 RX ORDER — TRAMADOL HYDROCHLORIDE 50 MG/1
50 TABLET ORAL EVERY 6 HOURS PRN
Status: DISCONTINUED | OUTPATIENT
Start: 2023-12-12 | End: 2023-12-13 | Stop reason: HOSPADM

## 2023-12-12 RX ORDER — BISACODYL 10 MG
10 SUPPOSITORY, RECTAL RECTAL DAILY PRN
Status: DISCONTINUED | OUTPATIENT
Start: 2023-12-12 | End: 2023-12-13 | Stop reason: HOSPADM

## 2023-12-12 RX ORDER — ACETAMINOPHEN 325 MG/1
975 TABLET ORAL EVERY 8 HOURS
Qty: 27 TABLET | Refills: 0 | Status: DISCONTINUED | OUTPATIENT
Start: 2023-12-12 | End: 2023-12-13 | Stop reason: HOSPADM

## 2023-12-12 RX ORDER — LIDOCAINE 40 MG/G
CREAM TOPICAL
Status: DISCONTINUED | OUTPATIENT
Start: 2023-12-12 | End: 2023-12-13 | Stop reason: HOSPADM

## 2023-12-12 RX ORDER — NALOXONE HYDROCHLORIDE 0.4 MG/ML
0.4 INJECTION, SOLUTION INTRAMUSCULAR; INTRAVENOUS; SUBCUTANEOUS
Status: DISCONTINUED | OUTPATIENT
Start: 2023-12-12 | End: 2023-12-13 | Stop reason: HOSPADM

## 2023-12-12 RX ORDER — LANOLIN ALCOHOL/MO/W.PET/CERES
1000 CREAM (GRAM) TOPICAL DAILY
COMMUNITY

## 2023-12-12 RX ORDER — GLYCOPYRROLATE 0.2 MG/ML
INJECTION, SOLUTION INTRAMUSCULAR; INTRAVENOUS PRN
Status: DISCONTINUED | OUTPATIENT
Start: 2023-12-12 | End: 2023-12-12

## 2023-12-12 RX ORDER — LANOLIN ALCOHOL/MO/W.PET/CERES
400 CREAM (GRAM) TOPICAL DAILY
Status: DISCONTINUED | OUTPATIENT
Start: 2023-12-13 | End: 2023-12-13 | Stop reason: HOSPADM

## 2023-12-12 RX ORDER — ASCORBIC ACID 500 MG
500 TABLET ORAL DAILY
COMMUNITY

## 2023-12-12 RX ORDER — NALOXONE HYDROCHLORIDE 0.4 MG/ML
0.2 INJECTION, SOLUTION INTRAMUSCULAR; INTRAVENOUS; SUBCUTANEOUS
Status: DISCONTINUED | OUTPATIENT
Start: 2023-12-12 | End: 2023-12-13 | Stop reason: HOSPADM

## 2023-12-12 RX ORDER — POLYETHYLENE GLYCOL 3350 17 G/17G
17 POWDER, FOR SOLUTION ORAL DAILY
Status: DISCONTINUED | OUTPATIENT
Start: 2023-12-13 | End: 2023-12-13 | Stop reason: HOSPADM

## 2023-12-12 RX ORDER — CEFAZOLIN SODIUM/WATER 2 G/20 ML
2 SYRINGE (ML) INTRAVENOUS SEE ADMIN INSTRUCTIONS
Status: DISCONTINUED | OUTPATIENT
Start: 2023-12-12 | End: 2023-12-12 | Stop reason: HOSPADM

## 2023-12-12 RX ORDER — HYDROMORPHONE HYDROCHLORIDE 1 MG/ML
0.2 INJECTION, SOLUTION INTRAMUSCULAR; INTRAVENOUS; SUBCUTANEOUS
Status: DISCONTINUED | OUTPATIENT
Start: 2023-12-12 | End: 2023-12-13

## 2023-12-12 RX ORDER — EPHEDRINE SULFATE 50 MG/ML
INJECTION, SOLUTION INTRAMUSCULAR; INTRAVENOUS; SUBCUTANEOUS PRN
Status: DISCONTINUED | OUTPATIENT
Start: 2023-12-12 | End: 2023-12-12

## 2023-12-12 RX ORDER — ACETAMINOPHEN 325 MG/1
975 TABLET ORAL ONCE
Status: COMPLETED | OUTPATIENT
Start: 2023-12-12 | End: 2023-12-12

## 2023-12-12 RX ORDER — LIDOCAINE HYDROCHLORIDE 10 MG/ML
INJECTION, SOLUTION INFILTRATION; PERINEURAL PRN
Status: DISCONTINUED | OUTPATIENT
Start: 2023-12-12 | End: 2023-12-12

## 2023-12-12 RX ORDER — FENTANYL CITRATE 50 UG/ML
25 INJECTION, SOLUTION INTRAMUSCULAR; INTRAVENOUS EVERY 5 MIN PRN
Status: DISCONTINUED | OUTPATIENT
Start: 2023-12-12 | End: 2023-12-12 | Stop reason: HOSPADM

## 2023-12-12 RX ORDER — OXYCODONE HYDROCHLORIDE 5 MG/1
10 TABLET ORAL
Status: DISCONTINUED | OUTPATIENT
Start: 2023-12-12 | End: 2023-12-12 | Stop reason: HOSPADM

## 2023-12-12 RX ORDER — PROPOFOL 10 MG/ML
INJECTION, EMULSION INTRAVENOUS PRN
Status: DISCONTINUED | OUTPATIENT
Start: 2023-12-12 | End: 2023-12-12

## 2023-12-12 RX ORDER — HYDROMORPHONE HYDROCHLORIDE 1 MG/ML
0.4 INJECTION, SOLUTION INTRAMUSCULAR; INTRAVENOUS; SUBCUTANEOUS EVERY 5 MIN PRN
Status: DISCONTINUED | OUTPATIENT
Start: 2023-12-12 | End: 2023-12-12 | Stop reason: HOSPADM

## 2023-12-12 RX ORDER — SODIUM CHLORIDE, SODIUM LACTATE, POTASSIUM CHLORIDE, CALCIUM CHLORIDE 600; 310; 30; 20 MG/100ML; MG/100ML; MG/100ML; MG/100ML
INJECTION, SOLUTION INTRAVENOUS CONTINUOUS
Status: DISCONTINUED | OUTPATIENT
Start: 2023-12-12 | End: 2023-12-12 | Stop reason: HOSPADM

## 2023-12-12 RX ORDER — CEFAZOLIN SODIUM/WATER 2 G/20 ML
2 SYRINGE (ML) INTRAVENOUS
Status: COMPLETED | OUTPATIENT
Start: 2023-12-12 | End: 2023-12-12

## 2023-12-12 RX ORDER — FENTANYL CITRATE 50 UG/ML
50 INJECTION, SOLUTION INTRAMUSCULAR; INTRAVENOUS EVERY 5 MIN PRN
Status: DISCONTINUED | OUTPATIENT
Start: 2023-12-12 | End: 2023-12-12 | Stop reason: HOSPADM

## 2023-12-12 RX ORDER — ONDANSETRON 4 MG/1
4 TABLET, ORALLY DISINTEGRATING ORAL EVERY 30 MIN PRN
Status: DISCONTINUED | OUTPATIENT
Start: 2023-12-12 | End: 2023-12-12 | Stop reason: HOSPADM

## 2023-12-12 RX ORDER — OXYCODONE HYDROCHLORIDE 5 MG/1
5 TABLET ORAL
Status: COMPLETED | OUTPATIENT
Start: 2023-12-12 | End: 2023-12-12

## 2023-12-12 RX ORDER — LANOLIN ALCOHOL/MO/W.PET/CERES
400 CREAM (GRAM) TOPICAL DAILY
COMMUNITY
End: 2024-02-12

## 2023-12-12 RX ORDER — ONDANSETRON 2 MG/ML
INJECTION INTRAMUSCULAR; INTRAVENOUS PRN
Status: DISCONTINUED | OUTPATIENT
Start: 2023-12-12 | End: 2023-12-12

## 2023-12-12 RX ORDER — CETIRIZINE HYDROCHLORIDE 10 MG/1
10 TABLET ORAL PRN
COMMUNITY

## 2023-12-12 RX ORDER — GABAPENTIN 100 MG/1
100 CAPSULE ORAL AT BEDTIME
Status: DISCONTINUED | OUTPATIENT
Start: 2023-12-12 | End: 2023-12-13 | Stop reason: HOSPADM

## 2023-12-12 RX ORDER — CETIRIZINE HYDROCHLORIDE 10 MG/1
10 TABLET ORAL DAILY
Status: DISCONTINUED | OUTPATIENT
Start: 2023-12-12 | End: 2023-12-13 | Stop reason: HOSPADM

## 2023-12-12 RX ORDER — HYDROMORPHONE HYDROCHLORIDE 1 MG/ML
0.2 INJECTION, SOLUTION INTRAMUSCULAR; INTRAVENOUS; SUBCUTANEOUS EVERY 5 MIN PRN
Status: DISCONTINUED | OUTPATIENT
Start: 2023-12-12 | End: 2023-12-12 | Stop reason: HOSPADM

## 2023-12-12 RX ORDER — MULTIVIT WITH MINERALS/LUTEIN
500 TABLET ORAL DAILY
Status: DISCONTINUED | OUTPATIENT
Start: 2023-12-13 | End: 2023-12-13 | Stop reason: HOSPADM

## 2023-12-12 RX ORDER — HYDROMORPHONE HYDROCHLORIDE 1 MG/ML
0.4 INJECTION, SOLUTION INTRAMUSCULAR; INTRAVENOUS; SUBCUTANEOUS
Status: DISCONTINUED | OUTPATIENT
Start: 2023-12-12 | End: 2023-12-13

## 2023-12-12 RX ORDER — DEXAMETHASONE SODIUM PHOSPHATE 4 MG/ML
INJECTION, SOLUTION INTRA-ARTICULAR; INTRALESIONAL; INTRAMUSCULAR; INTRAVENOUS; SOFT TISSUE PRN
Status: DISCONTINUED | OUTPATIENT
Start: 2023-12-12 | End: 2023-12-12

## 2023-12-12 RX ORDER — LISINOPRIL 5 MG/1
10 TABLET ORAL DAILY
Status: DISCONTINUED | OUTPATIENT
Start: 2023-12-13 | End: 2023-12-13 | Stop reason: HOSPADM

## 2023-12-12 RX ORDER — LIDOCAINE 40 MG/G
CREAM TOPICAL
Status: DISCONTINUED | OUTPATIENT
Start: 2023-12-12 | End: 2023-12-12 | Stop reason: HOSPADM

## 2023-12-12 RX ORDER — KETAMINE HYDROCHLORIDE 10 MG/ML
INJECTION INTRAMUSCULAR; INTRAVENOUS PRN
Status: DISCONTINUED | OUTPATIENT
Start: 2023-12-12 | End: 2023-12-12

## 2023-12-12 RX ORDER — PROCHLORPERAZINE MALEATE 5 MG
5 TABLET ORAL EVERY 6 HOURS PRN
Status: DISCONTINUED | OUTPATIENT
Start: 2023-12-12 | End: 2023-12-13 | Stop reason: HOSPADM

## 2023-12-12 RX ORDER — ACETAMINOPHEN 325 MG/1
650 TABLET ORAL EVERY 4 HOURS PRN
Status: DISCONTINUED | OUTPATIENT
Start: 2023-12-15 | End: 2023-12-13 | Stop reason: HOSPADM

## 2023-12-12 RX ORDER — MAGNESIUM SULFATE 4 G/50ML
4 INJECTION INTRAVENOUS ONCE
Status: COMPLETED | OUTPATIENT
Start: 2023-12-12 | End: 2023-12-12

## 2023-12-12 RX ORDER — FENTANYL CITRATE 50 UG/ML
INJECTION, SOLUTION INTRAMUSCULAR; INTRAVENOUS PRN
Status: DISCONTINUED | OUTPATIENT
Start: 2023-12-12 | End: 2023-12-12

## 2023-12-12 RX ORDER — ATORVASTATIN CALCIUM 10 MG/1
10 TABLET, FILM COATED ORAL DAILY
Status: DISCONTINUED | OUTPATIENT
Start: 2023-12-12 | End: 2023-12-13 | Stop reason: HOSPADM

## 2023-12-12 RX ORDER — LANOLIN ALCOHOL/MO/W.PET/CERES
1000 CREAM (GRAM) TOPICAL DAILY
Status: DISCONTINUED | OUTPATIENT
Start: 2023-12-13 | End: 2023-12-13 | Stop reason: HOSPADM

## 2023-12-12 RX ORDER — ONDANSETRON 2 MG/ML
4 INJECTION INTRAMUSCULAR; INTRAVENOUS EVERY 6 HOURS PRN
Status: DISCONTINUED | OUTPATIENT
Start: 2023-12-12 | End: 2023-12-13 | Stop reason: HOSPADM

## 2023-12-12 RX ORDER — AMOXICILLIN 250 MG
1 CAPSULE ORAL 2 TIMES DAILY
Status: DISCONTINUED | OUTPATIENT
Start: 2023-12-12 | End: 2023-12-13 | Stop reason: HOSPADM

## 2023-12-12 RX ADMIN — Medication 2 G: at 08:12

## 2023-12-12 RX ADMIN — CETIRIZINE HYDROCHLORIDE 10 MG: 10 TABLET, FILM COATED ORAL at 20:51

## 2023-12-12 RX ADMIN — KETAMINE HYDROCHLORIDE 50 MG: 10 INJECTION INTRAMUSCULAR; INTRAVENOUS at 08:23

## 2023-12-12 RX ADMIN — SUGAMMADEX 200 MG: 100 INJECTION, SOLUTION INTRAVENOUS at 11:52

## 2023-12-12 RX ADMIN — ACETAMINOPHEN 975 MG: 325 TABLET ORAL at 06:52

## 2023-12-12 RX ADMIN — ATORVASTATIN CALCIUM 10 MG: 10 TABLET, FILM COATED ORAL at 20:51

## 2023-12-12 RX ADMIN — PROPOFOL 100 MG: 10 INJECTION, EMULSION INTRAVENOUS at 08:23

## 2023-12-12 RX ADMIN — DEXAMETHASONE SODIUM PHOSPHATE 4 MG: 4 INJECTION, SOLUTION INTRA-ARTICULAR; INTRALESIONAL; INTRAMUSCULAR; INTRAVENOUS; SOFT TISSUE at 08:59

## 2023-12-12 RX ADMIN — TRAMADOL HYDROCHLORIDE 50 MG: 50 TABLET, COATED ORAL at 23:54

## 2023-12-12 RX ADMIN — METHOCARBAMOL 750 MG: 750 TABLET ORAL at 13:14

## 2023-12-12 RX ADMIN — ROCURONIUM BROMIDE 50 MG: 50 INJECTION, SOLUTION INTRAVENOUS at 08:24

## 2023-12-12 RX ADMIN — HYDROMORPHONE HYDROCHLORIDE 0.4 MG: 1 INJECTION, SOLUTION INTRAMUSCULAR; INTRAVENOUS; SUBCUTANEOUS at 14:10

## 2023-12-12 RX ADMIN — ROCURONIUM BROMIDE 20 MG: 50 INJECTION, SOLUTION INTRAVENOUS at 10:47

## 2023-12-12 RX ADMIN — ROCURONIUM BROMIDE 20 MG: 50 INJECTION, SOLUTION INTRAVENOUS at 09:20

## 2023-12-12 RX ADMIN — LIDOCAINE HYDROCHLORIDE 50 MG: 10 INJECTION, SOLUTION INFILTRATION; PERINEURAL at 08:23

## 2023-12-12 RX ADMIN — MAGNESIUM SULFATE HEPTAHYDRATE 4 G: 80 INJECTION, SOLUTION INTRAVENOUS at 07:10

## 2023-12-12 RX ADMIN — HYDROMORPHONE HYDROCHLORIDE 0.5 MG: 1 INJECTION, SOLUTION INTRAMUSCULAR; INTRAVENOUS; SUBCUTANEOUS at 09:30

## 2023-12-12 RX ADMIN — FENTANYL CITRATE 100 MCG: 50 INJECTION INTRAMUSCULAR; INTRAVENOUS at 08:45

## 2023-12-12 RX ADMIN — Medication 5 MG: at 09:14

## 2023-12-12 RX ADMIN — ONDANSETRON 4 MG: 2 INJECTION INTRAMUSCULAR; INTRAVENOUS at 11:45

## 2023-12-12 RX ADMIN — GLYCOPYRROLATE 0.2 MG: 0.2 INJECTION INTRAMUSCULAR; INTRAVENOUS at 08:41

## 2023-12-12 RX ADMIN — ACETAMINOPHEN 975 MG: 325 TABLET ORAL at 13:14

## 2023-12-12 RX ADMIN — ACETAMINOPHEN 975 MG: 325 TABLET ORAL at 21:42

## 2023-12-12 RX ADMIN — Medication 5 MG: at 11:54

## 2023-12-12 RX ADMIN — Medication 5 MG: at 11:27

## 2023-12-12 RX ADMIN — Medication 5 MG: at 09:15

## 2023-12-12 RX ADMIN — MIDAZOLAM 2 MG: 1 INJECTION INTRAMUSCULAR; INTRAVENOUS at 08:07

## 2023-12-12 RX ADMIN — GABAPENTIN 100 MG: 100 CAPSULE ORAL at 21:42

## 2023-12-12 RX ADMIN — SODIUM CHLORIDE, POTASSIUM CHLORIDE, SODIUM LACTATE AND CALCIUM CHLORIDE: 600; 310; 30; 20 INJECTION, SOLUTION INTRAVENOUS at 07:10

## 2023-12-12 RX ADMIN — OXYCODONE HYDROCHLORIDE 5 MG: 5 TABLET ORAL at 13:05

## 2023-12-12 RX ADMIN — SENNOSIDES AND DOCUSATE SODIUM 1 TABLET: 8.6; 5 TABLET ORAL at 20:51

## 2023-12-12 RX ADMIN — Medication 5 MG: at 11:23

## 2023-12-12 RX ADMIN — SODIUM CHLORIDE, POTASSIUM CHLORIDE, SODIUM LACTATE AND CALCIUM CHLORIDE: 600; 310; 30; 20 INJECTION, SOLUTION INTRAVENOUS at 09:22

## 2023-12-12 ASSESSMENT — ACTIVITIES OF DAILY LIVING (ADL)
ADLS_ACUITY_SCORE: 18
ADLS_ACUITY_SCORE: 35
ADLS_ACUITY_SCORE: 18

## 2023-12-12 NOTE — INTERVAL H&P NOTE
I have reviewed the surgical (or preoperative) H&P that is linked to this encounter, and examined the patient. There are no significant changes    Plan for Procedure(s):  HERNIORRHAPHY, INCISIONAL, ROBOT-ASSISTED, LAPAROSCOPIC, USING DA BABATUNDE XI with bilateral transversus abdominus release     Eduardo Espinoza [facility]  [phone]

## 2023-12-12 NOTE — ANESTHESIA PREPROCEDURE EVALUATION
Anesthesia Pre-Procedure Evaluation    Patient: Juan Fleming   MRN: [id number] : 1955        Procedure : Procedure(s):  HERNIORRHAPHY, INCISIONAL, ROBOT-ASSISTED, LAPAROSCOPIC, USING DA BABATUNDE XI with bilateral transversus abdominus release          Past Medical History:   Diagnosis Date    Alcohol abuse     Anemia     Anxiety     BPH (benign prostatic hyperplasia)     Colorectal polyps 2020    none high grade; repeat colonoscopy in 3 yrs - next 2026    Diabetes mellitus (H)     Hernia 2017    Ventral    History of pulmonary embolism     Hx SBO     Hypertension     Influenza     PE (pulmonary embolism)     Perforated bowel (H)     Ventral hernia without obstruction or gangrene       Past Surgical History:   Procedure Laterality Date    ABDOMEN SURGERY      Colen resection    HERNIORRHAPHY VENTRAL N/A 11/3/2017    Procedure: REPAIR, HERNIA, VENTRAL, OPEN;  Surgeon: Maxine Rivera MD;  Location: [facility];  Service:     [facility] APPENDECTOMY      Description: Appendectomy;  Recorded: 2008;    [facility] EXPLORATORY OF ABDOMEN N/A 11/3/2017    Procedure: LAPAROTOMY, LYSIS OF ADHESIONS, MESH EXPLANTATION VENTRAL HERNIA REPAIR WITH MESH ;  Surgeon: Maxine Rivera MD;  Location: [facility];  Service: General    [facility]PAIR INCISIONAL HERNIA,REDUCIBLE      Description: Ventral Hernia Repair;  Recorded: 2008;    [facility] REPAIR UMBILICAL ERAN,<4Y/O,REDUC      Description: Umbilical Hernia Repair;  Recorded: 2008;      No Known Allergies   Social History     Tobacco Use    Smoking status: Former     Packs/day: 1.00     Years: 27.00     Additional pack years: 0.00     Total pack years: 27.00     Types: Cigarettes     Start date:      Quit date: 2015     Years since quittin.8     Passive exposure: Never    Smokeless tobacco: Never    Tobacco comments:     no passive exposure   Substance Use Topics    Alcohol use: No     Comment: Alcoholic Drinks/day: quit drinkning      "  Wt Readings from Last 1 Encounters:   12/12/23 89.5 kg (197 lb 6.4 oz)        Anesthesia Evaluation   Pt has had prior anesthetic.     No history of anesthetic complications       ROS/MED HX  ENT/Pulmonary:       Neurologic:       Cardiovascular:     (+)  hypertension- -   -  - -                           valvular problems/murmurs           METS/Exercise Tolerance:     Hematologic:       Musculoskeletal:       GI/Hepatic:       Renal/Genitourinary:     (+) renal disease,             Endo:     (+) type I DM,                     Psychiatric/Substance Use:       Infectious Disease:       Malignancy:       Other:            Physical Exam    Airway        Mallampati: II    Neck ROM: full     Respiratory Devices and Support         Dental       (+) Minor Abnormalities - some fillings, tiny chips      Cardiovascular   cardiovascular exam normal          Pulmonary   pulmonary exam normal                OUTSIDE LABS:  CBC:   Lab Results   Component Value Date    WBC 12.0 (H) 11/28/2023    WBC 6.7 10/18/2023    HGB 12.8 (L) 11/28/2023    HGB 12.0 (L) 10/18/2023    HCT 39.2 (L) 11/28/2023    HCT 35.9 (L) 10/18/2023     11/28/2023     10/18/2023     BMP:   Lab Results   Component Value Date     11/28/2023     10/18/2023    POTASSIUM 4.7 11/28/2023    POTASSIUM 4.5 10/18/2023    CHLORIDE 101 11/28/2023    CHLORIDE 101 10/18/2023    CO2 27 11/28/2023    CO2 22 10/18/2023    BUN 23.7 (H) 11/28/2023    BUN 18.2 10/18/2023    CR 1.15 11/28/2023    CR 0.95 10/18/2023     (H) 11/28/2023     (H) 10/18/2023     COAGS:   Lab Results   Component Value Date    INR 1.03 08/23/2014     POC: No results found for: \"BGM\", \"HCG\", \"HCGS\"  HEPATIC:   Lab Results   Component Value Date    ALBUMIN 4.8 11/28/2023    PROTTOTAL 7.7 11/28/2023    ALT 13 11/28/2023    AST 24 11/28/2023    ALKPHOS 89 11/28/2023    BILITOTAL 0.4 11/28/2023     OTHER:   Lab Results   Component Value Date    LACT 1.0 11/28/2023    " "A1C 6.2 (H) 10/18/2023    MITCH 10.0 11/28/2023    LIPASE 47 11/28/2023    TSH 0.75 06/06/2023    CRP 18.1 (H) 03/12/2018    SED 34 (H) 03/11/2018       Anesthesia Plan    ASA Status:  3       Anesthesia Type: General.     - Airway: ETT              Consents    Anesthesia Plan(s) and associated risks, benefits, and realistic alternatives discussed. Questions answered and patient/representative(s) expressed understanding.     - Discussed: Risks, Benefits and Alternatives for the PROCEDURE were discussed     - Discussed with:  Patient      - Extended Intubation/Ventilatory Support Discussed: No.      - Patient is DNR/DNI Status: No     Use of blood products discussed: No .     Postoperative Care    Pain management: Multi-modal analgesia.   PONV prophylaxis: Ondansetron (or other 5HT-3), Dexamethasone or Solumedrol     Comments:               Sandra Chen MD    I have reviewed the pertinent notes and labs in the chart from the past 30 days and (re)examined the patient.  Any updates or changes from those notes are reflected in this note.              # Overweight: Estimated body mass index is 28.74 kg/m  as calculated from the following:    Height as of 12/1/23: 1.765 m (5' 9.49\").    Weight as of this encounter: 89.5 kg (197 lb 6.4 oz).      "

## 2023-12-12 NOTE — PHARMACY-ADMISSION MEDICATION HISTORY
Pharmacist Admission Medication History    Admission medication history is complete. The information provided in this note is only as accurate as the sources available at the time of the update.    Information Source(s): Patient and Family member via in-person    Pertinent Information: Patient did take Lisinopril this morning prior to procedure    Changes made to PTA medication list:  Added: metamucil, folic acid, cetirizine, cyanocobalamin, ascorbic acid   Deleted: dicyclomine  Changed: None    Allergies reviewed with patient and updates made in EHR: yes    Medication History Completed By: Melchor York [facility] 12/12/2023 7:21 AM    PTA Med List   Medication Sig Last Dose    atorvastatin (LIPITOR) 10 MG tablet Take 1 tablet (10 mg total) by mouth daily. 12/11/2023 at AM    cetirizine (ZYRTEC) 10 MG tablet Take 10 mg by mouth daily 12/11/2023 at AM    cyanocobalamin (VITAMIN B-12) 1000 MCG tablet Take 1,000 mcg by mouth daily 12/11/2023 at AM    folic acid (FOLVITE) 400 MCG tablet Take 400 mcg by mouth daily 12/11/2023 at AM    lisinopril (ZESTRIL) 10 MG tablet Take 1 tablet (10 mg) by mouth daily 12/12/2023 at 0445    psyllium (METAMUCIL/KONSYL) Packet Take 1 packet by mouth daily 12/11/2023 at AM    vitamin C (ASCORBIC ACID) 500 MG tablet Take 500 mg by mouth daily 12/11/2023 at AM

## 2023-12-12 NOTE — ANESTHESIA PROCEDURE NOTES
Airway       Patient location during procedure: ICU       Procedure Start/Stop Times: 12/12/2023 8:25 AM  Staff -        CRNA: Megan Nicole APRN CRNA       Performed By: CRNA  Consent for Airway        Urgency: elective  Indications and Patient Condition       Indications for airway management: neema-procedural       Mallampati: I     Induction type:intravenous       Mask difficulty assessment: 1 - vent by mask    Final Airway Details       Final airway type: endotracheal airway       Successful airway: ETT - single  Endotracheal Airway Details        ETT size (mm): 8.0       Cuffed: yes       Successful intubation technique: direct laryngoscopy       DL Blade Type: Gerard 2       Grade View of Cords: 1       Adjucts: stylet       Position: Right       Measured from: lips       Secured at (cm): 24       Bite block used: None    Post intubation assessment        Placement verified by: capnometry, equal breath sounds and chest rise        Number of attempts at approach: 1       Number of other approaches attempted: 0       Secured with: tape       Ease of procedure: easy       Dentition: Unchanged       Dental guard used and removed. Dental Guard Type: Standard White.    Medication(s) Administered   Medication Administration Time: 12/12/2023 8:25 AM

## 2023-12-12 NOTE — ANESTHESIA CARE TRANSFER NOTE
Patient: Juan Fleming    Procedure: Procedure(s):  HERNIORRHAPHY, INCISIONAL, ROBOT-ASSISTED, LAPAROSCOPIC, USING DA BABATUNDE XI with bilateral transversus abdominus release       Diagnosis: Ventral hernia without obstruction or gangrene [K43.9]  Diagnosis Additional Information: No value filed.    Anesthesia Type:   General     Note:    Oropharynx: oropharynx clear of all foreign objects  Level of Consciousness: awake  Oxygen Supplementation: face mask  Level of Supplemental Oxygen (L/min / FiO2): 8  Independent Airway: airway patency satisfactory and stable  Dentition: dentition unchanged  Vital Signs Stable: post-procedure vital signs reviewed and stable  Report to RN Given: handoff report given  Patient transferred to: PACU    Handoff Report: Identifed the Patient, Identified the Reponsible Provider, Reviewed the pertinent medical history, Discussed the surgical course, Reviewed Intra-OP anesthesia mangement and issues during anesthesia, Set expectations for post-procedure period and Allowed opportunity for questions and acknowledgement of understanding      Vitals:  Vitals Value Taken Time   /73 12/12/23 1207   Temp     Pulse 82 12/12/23 1209   Resp 14 12/12/23 1209   SpO2 99 % 12/12/23 1209   Vitals shown include unfiled device data.    Electronically Signed By: Megan Nicole CRNA, PATY BARBOSA  December 12, 2023  12:11 PM

## 2023-12-12 NOTE — OP NOTE
Name:  Juan Fleming  PCP:  Martine Ng  Procedure Date:  12/12/2023      Procedure(s):  Bilateral myocutaneous flaps x 2  Robotic recurrent incisional hernia repair with incarceration, 12cm x 10cm  Robotic lysis of adhesions extensive modifier 22      HERNIORRHAPHY, INCISIONAL, ROBOT-ASSISTED, LAPAROSCOPIC, USING DA BABATUNDE XI with bilateral transversus abdominus release    Pre-Procedure Diagnosis:  Ventral hernia without obstruction or gangrene [K43.9]     Post-Procedure Diagnosis:    Incisional hernia    Surgeon(s):  Bon Espinoza DO    Circulator: Jaci Veronica, MARIAH; Alesha Elmore, RN; Kristen Ku, MARIAH  Relief Scrub: Wen Coombs  Scrub Person: Jesus Alberto Bolaños; Sharron Lovelace    Anesthesia Type:  GET      Findings:  12 x 10cm chronically incarcerated, recurrent incisional hernia with small bowel   Significant adhesions to incorporated Gortex mesh.     Operative Report:      The Patient was taken the operating placed in supine position.  Endotracheal intubation was performed and antibiotics were given prior to skin incision.  A tap block was performed by the anesthesia team.  A Mejia catheter was placed.  The abdomen was prepped and draped in sterile fashion.   The patient was then flexed and the patient was rotated to the right tilt.  I first established the pneumoperitoneum by make an 8 mm left upper quadrant incision and a Veress needle technique.  Once this was complete I then placed an 8 mm trocar into the abdomen.  The surrounding structures were scrutinized for any injuries upon entry I did not find any.  I placed 2 additional left mid abdominal wall 8 mm trochars and then upsized the left upper quadrant trocar for a 12 mm trocar.   The robot was then docked and I turned my attention to the console.  I proceeded to take down the adhesions.  These adhesions were very extensive involving small intestine chronically incarcerated into the hernia defect.  There was also evidence of old  [place] mesh with metallic tacks that had been peritonealized and adhered to the omentum.  I spent several hours taking down the adhesions which was extremely extensive.  This was done with sharp dissection.  Care was taken so as to maintain the integrity of the small intestine.  A small serosal injury was oversewn with 2-0 Vicryl suture in an interrupted fashion.  At no time was there any succus noted.    .  I then entered the retrorectus space and took down the transversalis fascia laterally along the right abdominal wall.  Large  vessels were preserved.  Once I established access to the transversus abdominis musculature on the right I then transected this with electrocautery in standard fashion.  All peritoneal rents were oversewn with 2-0 Vicryl suture.  Once this portion of the procedure was complete I then used a ruler to measure the abdominal wall defect.  This measured to be approximately 1 cm x 10 cm.  I then placed a 30 x 30 cm macro porous polypropylene soft mesh wall at the site of previous dissection with 0 Ethibond sutures in an interrupted fashion.  Once this was complete I then undocked the robot and scrubbed back into the case.  I placed 3 additional right abdominal wall 8 mm trochars under direct visualization.    I then re-docked the robot once the patient was tilted to the left and proceeded to take down the transversalis fascia on the left lateral abdominal wall in the standard fashion.  I entered the retrorectus space and completed this dissection down to the transversus abdominis musculature.  Once this was located it was transected in the standard fashion with electrocautery.  All peritoneal rents were oversewn with 2-0 Vicryl sutures to close the defect.    Once this portion of the dissection was complete I then turned my attention to the abdominal wall defect.  I reapproximated the rectus muscles in the midline with 0V lock permanent suture x 2.  Once this portion was complete I  then turned my attention to the transversalis fascia.  I reapproximated this with additional 2-0 absorbable V lock suture in a running fashion.  I then unfurled the previously placed mesh from the left abdominal wall to the right abdominal wall.  Once the mesh was in place the excess was trimmed with robotic scissors.  I then tacked the mesh to the lateral abdominal wall on the left with additional 0 Ethibond sutures.  At this point the hernia closure and mesh placement were complete I then undocked the robot and desufflated the abdomen.  All skin edges were then reapproximated with 4-0 Monocryl suture.  An abdominal binder was placed the patient was extubated and Mejia catheter was removed.  All lap counts and needle counts were correct at the end of the procedure.  Estimated Blood Loss:   25cc    Specimens:    * No specimens in log *       Drains:   NG/OG/NJ Tube Orogastric Center mouth (Active)       Urethral Catheter 12/12/23 Latex 16 fr (Active)       Complications:    None    Bon Espinoza DO

## 2023-12-13 VITALS
TEMPERATURE: 97.8 F | RESPIRATION RATE: 18 BRPM | BODY MASS INDEX: 30.5 KG/M2 | DIASTOLIC BLOOD PRESSURE: 61 MMHG | SYSTOLIC BLOOD PRESSURE: 118 MMHG | WEIGHT: 205.91 LBS | HEIGHT: 69 IN | HEART RATE: 55 BPM | OXYGEN SATURATION: 95 %

## 2023-12-13 LAB
GLUCOSE BLDC GLUCOMTR-MCNC: 90 MG/DL (ref 70–99)
PLATELET # BLD AUTO: 176 10E3/UL (ref 150–450)

## 2023-12-13 PROCEDURE — 99024 POSTOP FOLLOW-UP VISIT: CPT | Performed by: PHYSICIAN ASSISTANT

## 2023-12-13 PROCEDURE — 85049 AUTOMATED PLATELET COUNT: CPT | Performed by: SURGERY

## 2023-12-13 PROCEDURE — 250N000013 HC RX MED GY IP 250 OP 250 PS 637: Performed by: SURGERY

## 2023-12-13 PROCEDURE — 36415 COLL VENOUS BLD VENIPUNCTURE: CPT | Performed by: SURGERY

## 2023-12-13 PROCEDURE — 250N000011 HC RX IP 250 OP 636: Mod: JZ | Performed by: SURGERY

## 2023-12-13 RX ORDER — ACETAMINOPHEN 325 MG/1
650 TABLET ORAL EVERY 4 HOURS PRN
COMMUNITY
Start: 2023-12-15 | End: 2023-12-13

## 2023-12-13 RX ORDER — AMOXICILLIN 250 MG
1 CAPSULE ORAL 2 TIMES DAILY PRN
COMMUNITY
Start: 2023-12-13 | End: 2024-02-12

## 2023-12-13 RX ORDER — TRAMADOL HYDROCHLORIDE 50 MG/1
50 TABLET ORAL EVERY 6 HOURS PRN
Qty: 12 TABLET | Refills: 0 | Status: SHIPPED | OUTPATIENT
Start: 2023-12-13 | End: 2024-02-12

## 2023-12-13 RX ORDER — ACETAMINOPHEN 500 MG
500-1000 TABLET ORAL EVERY 6 HOURS PRN
COMMUNITY
Start: 2023-12-13

## 2023-12-13 RX ORDER — IBUPROFEN 400 MG/1
400 TABLET, FILM COATED ORAL EVERY 4 HOURS PRN
COMMUNITY
Start: 2023-12-13 | End: 2024-02-12

## 2023-12-13 RX ORDER — POLYETHYLENE GLYCOL 3350 17 G/17G
17 POWDER, FOR SOLUTION ORAL DAILY
COMMUNITY
Start: 2023-12-14 | End: 2024-02-12

## 2023-12-13 RX ADMIN — POLYETHYLENE GLYCOL 3350 17 G: 17 POWDER, FOR SOLUTION ORAL at 08:34

## 2023-12-13 RX ADMIN — Medication 400 MCG: at 08:33

## 2023-12-13 RX ADMIN — SENNOSIDES AND DOCUSATE SODIUM 1 TABLET: 8.6; 5 TABLET ORAL at 08:33

## 2023-12-13 RX ADMIN — ENOXAPARIN SODIUM 40 MG: 40 INJECTION SUBCUTANEOUS at 05:54

## 2023-12-13 RX ADMIN — Medication 500 MG: at 08:33

## 2023-12-13 RX ADMIN — TRAMADOL HYDROCHLORIDE 50 MG: 50 TABLET, COATED ORAL at 09:27

## 2023-12-13 RX ADMIN — ACETAMINOPHEN 975 MG: 325 TABLET ORAL at 05:54

## 2023-12-13 RX ADMIN — ATORVASTATIN CALCIUM 10 MG: 10 TABLET, FILM COATED ORAL at 08:34

## 2023-12-13 RX ADMIN — ACETAMINOPHEN 975 MG: 325 TABLET ORAL at 13:42

## 2023-12-13 RX ADMIN — CYANOCOBALAMIN TAB 1000 MCG 1000 MCG: 1000 TAB at 08:34

## 2023-12-13 RX ADMIN — LISINOPRIL 10 MG: 5 TABLET ORAL at 08:35

## 2023-12-13 ASSESSMENT — ACTIVITIES OF DAILY LIVING (ADL)
ADLS_ACUITY_SCORE: 18
ADLS_ACUITY_SCORE: 18
ADLS_ACUITY_SCORE: 21
ADLS_ACUITY_SCORE: 21
ADLS_ACUITY_SCORE: 18
ADLS_ACUITY_SCORE: 21

## 2023-12-13 NOTE — PLAN OF CARE
Problem: Adult Inpatient Plan of Care  Goal: Absence of Hospital-Acquired Illness or Injury  Intervention: Identify and Manage Fall Risk  Recent Flowsheet Documentation  Taken 12/13/2023 0800 by Chela Navarro, RN  Safety Promotion/Fall Prevention:   activity supervised   clutter free environment maintained   nonskid shoes/slippers when out of bed   safety round/check completed     Problem: Adult Inpatient Plan of Care  Goal: Optimal Comfort and Wellbeing  Outcome: Progressing  Intervention: Monitor Pain and Promote Comfort  Recent Flowsheet Documentation  Taken 12/13/2023 0742 by Chela Navarro, RN  Pain Management Interventions:   emotional support   rest     Problem: Surgery Nonspecified  Goal: Effective Urinary Elimination  Outcome: Progressing   Goal Outcome Evaluation:         A/Ox4. VSS on RA. C/o 2/10 abdominal pain. PRN tramadol given x1. Up SBA with GB for first walk this morning. Now up ind, steady. Ambulated halls x2 this shift. Regular diet. Passing gas. Denies nausea. Using IS with good tolerance. Mejia removed at 0945 this morning. Voided 150 ml at 1340. R PIV SL. 6 lap sites with steristrips and bandaid, CDI. Ab binder on. Nursing to continue to monitor.

## 2023-12-13 NOTE — PLAN OF CARE
Goal Outcome Evaluation:                    Problem: Surgery Nonspecified  Goal: Optimal Pain Control and Function  Outcome: Progressing     Problem: Surgery Nonspecified  Goal: Effective Urinary Elimination  Outcome: Progressing     Problem: Adult Inpatient Plan of Care  Goal: Absence of Hospital-Acquired Illness or Injury  Intervention: Identify and Manage Fall Risk  Recent Flowsheet Documentation  Taken 12/13/2023 0717 by Bridgett Madera RN  Safety Promotion/Fall Prevention: safety round/check completed     Patient alert and oriented. Mejia catheter removed this AM. Voided freely. Tramadol was given prior to ambulation which was beneficial. Ambulated 550 feet without issue. Incentive spirometer was instructed for patient to use q10 hourly. 6 abdominal incisions clean, dry, and intact. Steri strips covered with band aid. Abdominal binder on. Regular diet.

## 2023-12-13 NOTE — DISCHARGE INSTRUCTIONS
From your Surgeon:    Follow up: Please follow up with general surgery, Dr. Espinoza, as scheduled. If you have any questions or concerns, please do not hesitate to call us at [phone].    Diet: Regular diet. Patients can have difficulty with constipation following surgery, due in part to the administration of narcotic medications.  If you are suffering with constipation, you should avoid foods such as hard cheeses or red meat.       Activity: You should continue to be active at home, including ambulating frequently.  If possible try to limit the amount of time spent in bed.    Restrictions: Continue wearing your abdominal binder at all times for 2 weeks. Okay to remove for hygiene purposes. You should not lift greater than 20 pounds for 6 weeks, and will want to avoid strenuous physical activity for 4-6 weeks.  You should limit your physical activity if it causes you discomfort; however, this should resolve within 4-6 weeks.   Walking does not count as strenuous physical activity.  You are safe to walk up and down stairs.  Following 6 weeks you may resume all normal physical activity.    Wound care: You may remove your Band-aids after a period of 24 hours. The small white strips on the incisions act like artificial scabs, and will begin to peel at the edges at around 5-7 days. These can then be removed.  It is normal to have a small rim of red present around the incisions. This should not, however, extend beyond 1/4 inch from the incision.  If your incisions become increasingly tender, red, or draining, please contact us.       Bathing: You may shower after 24 hours from surgery.  It is ok to get your incisions wet, but avoid rubbing them.  Avoid soaking in bath tubs, or swimming in lakes, pools, or streams for 2 weeks following surgery.

## 2023-12-13 NOTE — DISCHARGE SUMMARY
Discharge Provider: CAMERON MARTIN PA-C  Admission Date: 12/12/2023  Discharge Date: 12/13/2023      Primary Diagnosis at Discharge:   Patient Active Problem List   Diagnosis    Benign prostatic hyperplasia    History of pulmonary embolism    Acrochordon    Anxiety    Alcohol abuse    Type 2 diabetes mellitus with complication, without long-term current use of insulin (H)    Hyperosmolarity due to secondary diabetes mellitus (H)    SINDI (acute kidney injury) (H24)    Hyponatremia    Dyslipidemia    Benign essential HTN    Bunion, left    Heart murmur    Onychomycosis    H/O small bowel obstruction    History of incisional hernia repair      Disposition: Home or Self Care  Condition at Discharge: Stable     Surgery: Robot-assisted, laparoscopic JESS, incisional hernia repair with bilateral transverse abdominis release    Hospital Summary:   Juan Fleming was admitted for surgery and underwent an uncomplicated robot-assisted, laparoscopic JESS, incisional hernia repair with bilateral transverse abdominis release.  Following a brief recovery in the PACU, he was transferred to the Med/Surg floor for the remainder of his stay.  His post operative course was uneventful, and his diet was advanced as tolerated.  On POD #1, he was discharged home tolerating a general diet, ambulating without assistance, and pain controlled with oral analgesics.        Discharge Medications:      Medication List        Started      acetaminophen 500 MG tablet  Commonly known as: TYLENOL  500-1,000 mg, Oral, EVERY 6 HOURS PRN     ibuprofen 400 MG tablet  Commonly known as: ADVIL/MOTRIN  400 mg, Oral, EVERY 4 HOURS PRN     polyethylene glycol 17 g packet  Commonly known as: MIRALAX  17 g, Oral, DAILY  Start taking on: December 14, 2023     senna-docusate 8.6-50 MG tablet  Commonly known as: SENOKOT-S/PERICOLACE  1 tablet, Oral, 2 TIMES DAILY PRN     traMADol 50 MG tablet  Commonly known as: ULTRAM  50 mg, Oral, EVERY 6 HOURS PRN               Discharge Instructions:  Follow up appointment with Primary Care Physician: Martine Ng  Follow up appointment with General Surgery in: 2-3 weeks    It is our practice to have all patients follow up with us 2-3 weeks after their surgery to ensure they are recovering well.  For straightforward laparoscopic procedures, this can be done either in clinic as a scheduled follow up appointment, or over the phone.  If you would like a scheduled follow up appointment in clinic, please call us at [phone] to schedule an appointment at your convenience.  If you would prefer to follow up with us by phone please let us know so that we may contact you 2-3 weeks following your procedure.    Post operative instructions:   Diet: Regular diet    Activity: You should continue to be active at home including ambulating frequently.  If possible try to limit the amount of time spent in bed.    Restrictions: You should avoid lifting anything more than 20 pounds or strenuous physical activity for a minimum of 6 weeks.  This is to help reduce the risk of hernia  formation at your port sites.  Walking does not count as strenuous physical activity.  You are safe to walk up and down stairs.  Following 6 weeks he may resume all normal physical activity.    Wound / drain care: You may remove your outer dressings after a period of 48 hours. The small white strips on the incisions act like artificial scabs, and will begin to peel at the edges at around 7-10 days. These can then be removed.    Bathing: You may shower after 48 hours from surgery. It is ok to get your incisions wet, but avoid rubbing them. Avoid soaking in bath tubs, or swimming in lakes, pools, or streams for 4 weeks following surgery.          Janet Mayfield PA-C  [facility] General Surgery  [facility] [address]  [place] [address]

## 2023-12-13 NOTE — PLAN OF CARE
Problem: Adult Inpatient Plan of Care  Goal: Plan of Care Review  Description: The Plan of Care Review/Shift note should be completed every shift.  The Outcome Evaluation is a brief statement about your assessment that the patient is improving, declining, or no change.  This information will be displayed automatically on your shift  note.  Outcome: Progressing   Goal Outcome Evaluation:    Patient is alert and oriented. Minimal pain throughout the night for which Tramadol was given once. Right PIV in place. Mejia in place. Abdominal binder in place. Advanced to regular diet. Vital signs stable.   Shirley Broderick RN

## 2023-12-13 NOTE — PLAN OF CARE
Problem: Surgery Nonspecified  Goal: Absence of Bleeding  Outcome: Progressing  Goal: Nausea and Vomiting Relief  Outcome: Progressing  Goal: Effective Urinary Elimination  Outcome: Progressing   Goal Outcome Evaluation:       Dressings CDI. Denies nausea. Mejia patent. Minimal pain noted. Denies need for narcotics. Had scheduled Tylenol and Neurontin. Ambulated short distance in the hallway but was too painful and returned back to room.

## 2023-12-13 NOTE — PROGRESS NOTES
"General Surgery Progress Note:    Hospital Day # 1    ASSESSMENT:  No diagnosis found.    Juan Fleming is a 68 year old male who is s/p robot-assisted, JESS, laparoscopic incisional hernia repair with bilateral transversus abdominis release on 12/12/23. Patient doing well post-operatively. Pain moderately well controlled. Tolerating a regular diet and passing flatus. Rodrigues to be removed. Possible discharge later today pending pain tolerance.    PLAN:  - Regular diet as tolerated  - Abdominal binder at all times  - Transition to PO pain medication  - Increase activity and ambulation to promote bowel function  - Remove rodrigues catheter  - Discharge later today versus tomorrow pending pain control    SUBJECTIVE:   He is feeling okay post-operatively. No abdominal pain a rest. Pain exacerbated with movement, coughing and deep breaths. Tolerating a regular diet without nausea, vomiting and passing flatus. Ambulated once out in the hallway yesterday evening. Rodrigues catheter in place.      Patient Vitals for the past 24 hrs:   BP Temp Temp src Pulse Resp SpO2 Height Weight   12/13/23 0840 101/52 -- -- -- -- -- -- --   12/13/23 0753 103/55 97.9  F (36.6  C) Oral 56 20 95 % -- --   12/13/23 0742 -- -- -- -- 20 -- -- --   12/13/23 0016 127/66 97.5  F (36.4  C) Oral 50 18 97 % -- --   12/12/23 1845 115/64 98.7  F (37.1  C) Oral 59 18 95 % -- --   12/12/23 1745 124/60 -- -- 64 19 95 % -- --   12/12/23 1615 123/60 -- -- 66 18 96 % -- --   12/12/23 1541 (!) 148/72 98.2  F (36.8  C) Oral 66 18 96 % 1.753 m (5' 9\") 93.4 kg (205 lb 14.6 oz)   12/12/23 1500 136/63 97.8  F (36.6  C) Temporal 60 16 96 % -- --   12/12/23 1400 (!) 149/70 -- -- 65 16 95 % -- --   12/12/23 1330 (!) 140/67 -- -- 69 16 95 % -- --   12/12/23 1300 (!) 151/81 98.6  F (37  C) Temporal 75 16 95 % -- --   12/12/23 1230 (!) 150/70 -- -- 76 13 96 % -- --   12/12/23 1215 (!) 150/70 -- -- 76 17 99 % -- --   12/12/23 1206 (!) 157/73 98.2  F (36.8  C) Temporal 83 13 99 % -- " --         PHYSICAL EXAM:  General: patient seen resting in bed, no acute distress  Resp: no respiratory distress, breathing comfortably on room air  Abdomen: Abdominal binder in place. Soft, tender to palpation over incisions, non-distended. Laparoscopic incisions clean/dry/intact with steri-strips and overlying Band-Aids.  : Mejia catheter in place  Extremities: warm and well perfused      12/12 0700 - 12/13 0659  In: 2400 [P.O.:400; I.V.:2000]  Out: 875 [Urine:850]    Admission on 12/12/2023   Component Date Value    GLUCOSE BY METER POCT 12/12/2023 97     GLUCOSE BY METER POCT 12/12/2023 153 (H)     GLUCOSE BY METER POCT 12/12/2023 155 (H)     Creatinine 12/12/2023 1.20 (H)     GFR Estimate 12/12/2023 65     Hold Specimen 12/12/2023 JIC     Platelet Count 12/12/2023 174     Platelet Count 12/13/2023 176     GLUCOSE BY METER POCT 12/13/2023 90         Janet Mayfield PA-C  [facility] General Surgery  [address]  [address]  [place] [address]

## 2023-12-13 NOTE — ANESTHESIA POSTPROCEDURE EVALUATION
Patient: Juan Fleming    Procedure: Procedure(s):  HERNIORRHAPHY, INCISIONAL, ROBOT-ASSISTED, LAPAROSCOPIC, USING DA BABATUNDE XI with bilateral transversus abdominus release       Anesthesia Type:  General    Note:  Disposition: Outpatient   Postop Pain Control: Uneventful            Sign Out: Well controlled pain   PONV: No   Neuro/Psych: Uneventful            Sign Out: Acceptable/Baseline neuro status   Airway/Respiratory: Uneventful            Sign Out: Acceptable/Baseline resp. status   CV/Hemodynamics: Uneventful            Sign Out: Acceptable CV status; No obvious hypovolemia; No obvious fluid overload   Other NRE: NONE   DID A NON-ROUTINE EVENT OCCUR? No           Last vitals:  Vitals Value Taken Time   /63 12/12/23 1500   Temp 36.6  C (97.8  F) 12/12/23 1500   Pulse 77 12/12/23 1527   Resp 16 12/12/23 1500   SpO2 96 % 12/12/23 1528   Vitals shown include unfiled device data.    Electronically Signed By: Sandra Chen MD  December 13, 2023  10:13 AM

## 2023-12-14 ENCOUNTER — PATIENT OUTREACH (OUTPATIENT)
Dept: CARE COORDINATION | Facility: CLINIC | Age: 68
End: 2023-12-14
Payer: COMMERCIAL

## 2023-12-14 NOTE — PROGRESS NOTES
"  [facility]: [facility]: Post-Discharge Note  SITUATION                                                      Admission:    Admission Date: 12/12/23   Reason for Admission: Benign prostatic hyperplasia  Discharge:   Discharge Date: 12/13/23  Discharge Diagnosis: Benign prostatic hyperplasia    BACKGROUND                                                      Per hospital discharge summary and inpatient provider notes:Juan Fleming was admitted for surgery and underwent an uncomplicated robot-assisted, laparoscopic JESS, incisional hernia repair with bilateral transverse abdominis release.  Following a brief recovery in the PACU, he was transferred to the Med/Surg floor for the remainder of his stay.  His post operative course was uneventful, and his diet was advanced as tolerated.  On POD #1, he was discharged home tolerating a general diet, ambulating without assistance, and pain controlled with oral analgesics.       ASSESSMENT           Discharge Assessment  How are you doing now that you are home?: \" I am doing really good ?  How are your symptoms? (Red Flag symptoms escalate to triage hotline per guidelines): Improved  Do you feel your condition is stable enough to be safe at home until your provider visit?: Yes  Does the patient have their discharge instructions? : Yes  Does the patient have questions regarding their discharge instructions? : No  Were you started on any new medications or were there changes to any of your previous medications? : Yes  Does the patient have all of their medications?: Yes  Do you have questions regarding any of your medications? : No  Do you have all of your needed medical supplies or equipment (DME)?  (i.e. oxygen tank, CPAP, cane, etc.): Yes  Discharge follow-up appointment scheduled within 14 calendar days? : Yes  Discharge Follow Up Appointment Date: 12/28/23  Discharge Follow Up Appointment Scheduled with?: Specialty Care Provider    Post-op ([facility] CTA " Only)  If the patient had a surgery or procedure, do they have any questions for a nurse?: No             PLAN                                                      Outpatient Plan: Follow up appointment with Primary Care Physician: Martine Ng  Follow up appointment with General Surgery in: 2-3 weeks    Future Appointments   Date Time Provider Department Center   12/28/2023  9:50 AM [facility]   2/16/2024  7:30 AM [facility]         For any urgent concerns, please contact our 24 hour nurse triage line: [phone] ([phone])         [facility] Watts

## 2023-12-28 ENCOUNTER — OFFICE VISIT (OUTPATIENT)
Dept: SURGERY | Facility: CLINIC | Age: 68
End: 2023-12-28
Payer: COMMERCIAL

## 2023-12-28 DIAGNOSIS — K43.9 VENTRAL HERNIA WITHOUT OBSTRUCTION OR GANGRENE: Primary | ICD-10-CM

## 2023-12-28 PROCEDURE — 99212 OFFICE O/P EST SF 10 MIN: CPT | Performed by: SURGERY

## 2023-12-28 NOTE — LETTER
12/28/2023         RE: Juan Fleming  [address] [place] [address]        Dear Colleague,    Thank you for referring your patient, Juan Fleming, to the [facility]. Please see a copy of my visit note below.     HPI: Juan Fleming is here for follow up after abdominal reconstruction.  He is doing relatively well with minimal pain.    Allergies, Medications, Social History, Past Medical History and Past Surgical History were reviewed and are noted in the chart.    There were no vitals taken for this visit.  There is no height or weight on file to calculate BMI.      EXAM:   GENERAL: Appears well  Abdomen-rectus muscles well-approximated the midline with no evidence of seromas    Incision/Surgical Site 12/12/23 Left;Upper Abdomen (Active)       Incision/Surgical Site 12/12/23 Right;Upper Abdomen (Active)       Incision/Surgical Site 12/12/23 Left;Lower Flank (Active)       Incision/Surgical Site 12/12/23 Left;Upper Flank (Active)       Incision/Surgical Site 12/12/23 Right;Upper Flank (Active)       Incision/Surgical Site 12/12/23 Lower;Right Flank (Active)       Assessment/Plan: Juan Fleming continues to do well. His wound is healing and overall progressing well.  At this point he will continue with light activities including no heavy lifting or strenuous activities for a total of 6 weeks.  He will continue to wear the abdominal binder on a as needed basis for support.  He will now follow-up with me as needed.  I expect full recovery.        Eduardo Espinoza DO, FACS  [facility] Surgery  [phone]      Again, thank you for allowing me to participate in the care of your patient.        Sincerely,        Bon Espinoza, DO

## 2023-12-28 NOTE — PROGRESS NOTES
HPI: Juan Fleming is here for follow up after abdominal reconstruction.  He is doing relatively well with minimal pain.    Allergies, Medications, Social History, Past Medical History and Past Surgical History were reviewed and are noted in the chart.    There were no vitals taken for this visit.  There is no height or weight on file to calculate BMI.      EXAM:   GENERAL: Appears well  Abdomen-rectus muscles well-approximated the midline with no evidence of seromas    Incision/Surgical Site 12/12/23 Left;Upper Abdomen (Active)       Incision/Surgical Site 12/12/23 Right;Upper Abdomen (Active)       Incision/Surgical Site 12/12/23 Left;Lower Flank (Active)       Incision/Surgical Site 12/12/23 Left;Upper Flank (Active)       Incision/Surgical Site 12/12/23 Right;Upper Flank (Active)       Incision/Surgical Site 12/12/23 Lower;Right Flank (Active)       Assessment/Plan: Juan Fleming continues to do well. His wound is healing and overall progressing well.  At this point he will continue with light activities including no heavy lifting or strenuous activities for a total of 6 weeks.  He will continue to wear the abdominal binder on a as needed basis for support.  He will now follow-up with me as needed.  I expect full recovery.        Eduardo Espinoza DO [facility] Surgery  [phone]

## 2024-01-19 DIAGNOSIS — E11.8 TYPE 2 DIABETES MELLITUS WITH COMPLICATION, WITHOUT LONG-TERM CURRENT USE OF INSULIN (H): Primary | ICD-10-CM

## 2024-01-19 DIAGNOSIS — D50.8 OTHER IRON DEFICIENCY ANEMIA: ICD-10-CM

## 2024-01-22 ENCOUNTER — LAB (OUTPATIENT)
Dept: LAB | Facility: CLINIC | Age: 69
End: 2024-01-22
Payer: COMMERCIAL

## 2024-01-22 DIAGNOSIS — R53.83 OTHER FATIGUE: ICD-10-CM

## 2024-01-22 DIAGNOSIS — D63.8 ANEMIA IN OTHER CHRONIC DISEASES CLASSIFIED ELSEWHERE: ICD-10-CM

## 2024-01-22 DIAGNOSIS — N18.9 ANEMIA DUE TO CHRONIC KIDNEY DISEASE, UNSPECIFIED CKD STAGE: ICD-10-CM

## 2024-01-22 DIAGNOSIS — F41.1 ANXIETY STATE: ICD-10-CM

## 2024-01-22 DIAGNOSIS — D63.1 ANEMIA DUE TO CHRONIC KIDNEY DISEASE, UNSPECIFIED CKD STAGE: ICD-10-CM

## 2024-01-22 DIAGNOSIS — D50.8 OTHER IRON DEFICIENCY ANEMIA: ICD-10-CM

## 2024-01-22 DIAGNOSIS — E11.8 TYPE 2 DIABETES MELLITUS WITH COMPLICATION, WITHOUT LONG-TERM CURRENT USE OF INSULIN (H): ICD-10-CM

## 2024-01-22 DIAGNOSIS — D63.8 ANEMIA IN OTHER CHRONIC DISEASES CLASSIFIED ELSEWHERE: Primary | ICD-10-CM

## 2024-01-22 LAB
ALBUMIN UR-MCNC: NEGATIVE MG/DL
APPEARANCE UR: CLEAR
BACTERIA #/AREA URNS HPF: ABNORMAL /HPF
BILIRUB UR QL STRIP: NEGATIVE
COLOR UR AUTO: YELLOW
ERYTHROCYTE [DISTWIDTH] IN BLOOD BY AUTOMATED COUNT: 14.7 % (ref 10–15)
FERRITIN SERPL-MCNC: 167 NG/ML (ref 31–409)
GLUCOSE UR STRIP-MCNC: NEGATIVE MG/DL
HBA1C MFR BLD: 5.7 % (ref 0–5.6)
HCT VFR BLD AUTO: 35 % (ref 40–53)
HGB BLD-MCNC: 11.3 G/DL (ref 13.3–17.7)
HGB UR QL STRIP: ABNORMAL
IRON BINDING CAPACITY (ROCHE): 230 UG/DL (ref 240–430)
IRON SATN MFR SERPL: 33 % (ref 15–46)
IRON SERPL-MCNC: 75 UG/DL (ref 61–157)
KETONES UR STRIP-MCNC: NEGATIVE MG/DL
LEUKOCYTE ESTERASE UR QL STRIP: NEGATIVE
MCH RBC QN AUTO: 27.6 PG (ref 26.5–33)
MCHC RBC AUTO-ENTMCNC: 32.3 G/DL (ref 31.5–36.5)
MCV RBC AUTO: 85 FL (ref 78–100)
NITRATE UR QL: NEGATIVE
PH UR STRIP: 6 [PH] (ref 5–8)
PLATELET # BLD AUTO: 188 10E3/UL (ref 150–450)
RBC # BLD AUTO: 4.1 10E6/UL (ref 4.4–5.9)
RBC #/AREA URNS AUTO: ABNORMAL /HPF
RETICS # AUTO: 0.04 10E6/UL (ref 0.01–0.11)
RETICS/RBC NFR AUTO: 1 % (ref 0.8–2.7)
SP GR UR STRIP: 1.01 (ref 1–1.03)
SQUAMOUS #/AREA URNS AUTO: ABNORMAL /LPF
UROBILINOGEN UR STRIP-ACNC: 0.2 E.U./DL
WBC # BLD AUTO: 5.5 10E3/UL (ref 4–11)
WBC #/AREA URNS AUTO: ABNORMAL /HPF

## 2024-01-22 PROCEDURE — 82728 ASSAY OF FERRITIN: CPT

## 2024-01-22 PROCEDURE — 81001 URINALYSIS AUTO W/SCOPE: CPT

## 2024-01-22 PROCEDURE — 83036 HEMOGLOBIN GLYCOSYLATED A1C: CPT

## 2024-01-22 PROCEDURE — 36415 COLL VENOUS BLD VENIPUNCTURE: CPT

## 2024-01-22 PROCEDURE — 83540 ASSAY OF IRON: CPT

## 2024-01-22 PROCEDURE — 83550 IRON BINDING TEST: CPT

## 2024-01-22 PROCEDURE — 85027 COMPLETE CBC AUTOMATED: CPT

## 2024-01-22 PROCEDURE — 85045 AUTOMATED RETICULOCYTE COUNT: CPT

## 2024-01-22 PROCEDURE — 84443 ASSAY THYROID STIM HORMONE: CPT

## 2024-01-23 DIAGNOSIS — N18.9 ANEMIA DUE TO CHRONIC KIDNEY DISEASE, UNSPECIFIED CKD STAGE: Primary | ICD-10-CM

## 2024-01-23 DIAGNOSIS — R53.83 OTHER FATIGUE: ICD-10-CM

## 2024-01-23 DIAGNOSIS — F41.1 ANXIETY STATE: ICD-10-CM

## 2024-01-23 DIAGNOSIS — D63.1 ANEMIA DUE TO CHRONIC KIDNEY DISEASE, UNSPECIFIED CKD STAGE: Primary | ICD-10-CM

## 2024-01-23 LAB — TSH SERPL DL<=0.005 MIU/L-ACNC: 0.99 UIU/ML (ref 0.3–4.2)

## 2024-01-24 ENCOUNTER — PATIENT OUTREACH (OUTPATIENT)
Dept: ONCOLOGY | Facility: CLINIC | Age: 69
End: 2024-01-24
Payer: COMMERCIAL

## 2024-01-24 ENCOUNTER — TRANSCRIBE ORDERS (OUTPATIENT)
Dept: OTHER | Age: 69
End: 2024-01-24

## 2024-01-24 DIAGNOSIS — R71.8: ICD-10-CM

## 2024-01-24 DIAGNOSIS — D64.9 ANEMIA: Primary | ICD-10-CM

## 2024-01-24 NOTE — PROGRESS NOTES
[facility]: Hematology                                                                Hem/Onc  Referral reviewed  Adult Oncology/Hematology  Referral [[id number]]  Referral Details    Referred By  Referred To   Martine Ng MD   [address] [place] [address]   Phone: [phone]   Fax: [phone]    Diagnoses: Anemia   Order: Adult Oncology/Hematology  Referral    Dr Higgins   Comment: Verbal referral from Martine Goodwin MD-     called in per Pt by phone/ per Pt, Dr Ng wants Pt seen within a few weeks/requesting Dr Duenas/ records [facility] only/ anemia, low red blood count           ASSESSMENT      Clinical History (per Nurse review of records provided):    68 year old male patient referred as above    PCP: Martine Ng  Records Location: [facility]   Pertinent labs -- BOOKMARKED  CBC RESULTS:   Recent Labs   Lab Test 01/22/24  1008   WBC 5.5   RBC 4.10*   HGB 11.3*   HCT 35.0*   MCV 85   MCH 27.6   MCHC 32.3   RDW 14.7      No DIFF  Referring provider note(s)-- BOOKMARKED    INTERVENTION(S)                                                      -Referral Triage Scheduling Instructions added to Hem/Onc  referral for Patient Access rep ( number below, hours are Monday - Friday 8am - 4:30 pm) who will contact pt in the next 1-2 business days to schedule the consultation.    PLAN                                                      Hematology consult    See records team's Pre-Visit encounter documentation for additional records retrieval information.    Janet Mcdaniels, RN, BSN, OCN  Hematology/Oncology New Patient Nurse Navigator   [facility]  [phone]

## 2024-01-29 DIAGNOSIS — F41.0 PANIC DISORDER WITHOUT AGORAPHOBIA: ICD-10-CM

## 2024-01-29 DIAGNOSIS — F41.1 GAD (GENERALIZED ANXIETY DISORDER): Primary | ICD-10-CM

## 2024-01-30 DIAGNOSIS — R35.1 NOCTURIA: Primary | ICD-10-CM

## 2024-02-12 ENCOUNTER — ONCOLOGY VISIT (OUTPATIENT)
Dept: ONCOLOGY | Facility: HOSPITAL | Age: 69
End: 2024-02-12
Attending: FAMILY MEDICINE
Payer: COMMERCIAL

## 2024-02-12 ENCOUNTER — LAB (OUTPATIENT)
Dept: INFUSION THERAPY | Facility: HOSPITAL | Age: 69
End: 2024-02-12
Attending: FAMILY MEDICINE
Payer: COMMERCIAL

## 2024-02-12 ENCOUNTER — PRE VISIT (OUTPATIENT)
Dept: ONCOLOGY | Facility: HOSPITAL | Age: 69
End: 2024-02-12
Payer: COMMERCIAL

## 2024-02-12 VITALS
TEMPERATURE: 97.3 F | DIASTOLIC BLOOD PRESSURE: 67 MMHG | SYSTOLIC BLOOD PRESSURE: 135 MMHG | HEART RATE: 53 BPM | BODY MASS INDEX: 28.88 KG/M2 | WEIGHT: 195 LBS | OXYGEN SATURATION: 99 % | RESPIRATION RATE: 20 BRPM | HEIGHT: 69 IN

## 2024-02-12 DIAGNOSIS — N18.9 ANEMIA DUE TO CHRONIC KIDNEY DISEASE, UNSPECIFIED CKD STAGE: ICD-10-CM

## 2024-02-12 DIAGNOSIS — D63.1 ANEMIA DUE TO CHRONIC KIDNEY DISEASE, UNSPECIFIED CKD STAGE: ICD-10-CM

## 2024-02-12 DIAGNOSIS — D64.9 NORMOCYTIC ANEMIA: Primary | ICD-10-CM

## 2024-02-12 LAB
BASOPHILS # BLD AUTO: 0.1 10E3/UL (ref 0–0.2)
BASOPHILS NFR BLD AUTO: 1 %
EOSINOPHIL # BLD AUTO: 0 10E3/UL (ref 0–0.7)
EOSINOPHIL NFR BLD AUTO: 0 %
ERYTHROCYTE [DISTWIDTH] IN BLOOD BY AUTOMATED COUNT: 15 % (ref 10–15)
HCT VFR BLD AUTO: 36.2 % (ref 40–53)
HGB BLD-MCNC: 11.7 G/DL (ref 13.3–17.7)
IMM GRANULOCYTES # BLD: 0 10E3/UL
IMM GRANULOCYTES NFR BLD: 0 %
LYMPHOCYTES # BLD AUTO: 1.3 10E3/UL (ref 0.8–5.3)
LYMPHOCYTES NFR BLD AUTO: 21 %
MCH RBC QN AUTO: 27.3 PG (ref 26.5–33)
MCHC RBC AUTO-ENTMCNC: 32.3 G/DL (ref 31.5–36.5)
MCV RBC AUTO: 84 FL (ref 78–100)
MONOCYTES # BLD AUTO: 0.4 10E3/UL (ref 0–1.3)
MONOCYTES NFR BLD AUTO: 7 %
NEUTROPHILS # BLD AUTO: 4.2 10E3/UL (ref 1.6–8.3)
NEUTROPHILS NFR BLD AUTO: 71 %
NRBC # BLD AUTO: 0 10E3/UL
NRBC BLD AUTO-RTO: 0 /100
PLATELET # BLD AUTO: 221 10E3/UL (ref 150–450)
RBC # BLD AUTO: 4.29 10E6/UL (ref 4.4–5.9)
RETICS # AUTO: 0.03 10E6/UL (ref 0.01–0.11)
RETICS/RBC NFR AUTO: 0.7 % (ref 0.8–2.7)
SHBG SERPL-SCNC: 61 NMOL/L (ref 11–80)
WBC # BLD AUTO: 6 10E3/UL (ref 4–11)

## 2024-02-12 PROCEDURE — 84403 ASSAY OF TOTAL TESTOSTERONE: CPT | Performed by: INTERNAL MEDICINE

## 2024-02-12 PROCEDURE — 84270 ASSAY OF SEX HORMONE GLOBUL: CPT | Performed by: INTERNAL MEDICINE

## 2024-02-12 PROCEDURE — 36415 COLL VENOUS BLD VENIPUNCTURE: CPT | Performed by: INTERNAL MEDICINE

## 2024-02-12 PROCEDURE — G0463 HOSPITAL OUTPT CLINIC VISIT: HCPCS | Performed by: INTERNAL MEDICINE

## 2024-02-12 PROCEDURE — 99204 OFFICE O/P NEW MOD 45 MIN: CPT | Performed by: INTERNAL MEDICINE

## 2024-02-12 PROCEDURE — 85025 COMPLETE CBC W/AUTO DIFF WBC: CPT | Performed by: INTERNAL MEDICINE

## 2024-02-12 PROCEDURE — 85045 AUTOMATED RETICULOCYTE COUNT: CPT | Performed by: INTERNAL MEDICINE

## 2024-02-12 ASSESSMENT — PAIN SCALES - GENERAL: PAINLEVEL: NO PAIN (0)

## 2024-02-12 NOTE — PROGRESS NOTES
"Oncology Rooming Note    February 12, 2024 10:58 AM   Juan Fleming is a 68 year old male who presents for:    Chief Complaint   Patient presents with    Hematology     New patient consult related to Anemia     Initial Vitals: /67 (BP Location: Right arm, Patient Position: Sitting, Cuff Size: Adult Regular)   Pulse 53   Temp 97.3  F (36.3  C) (Tympanic)   Resp 20   Ht 1.759 m (5' 9.25\")   Wt 88.5 kg (195 lb)   SpO2 99%   BMI 28.59 kg/m   Estimated body mass index is 28.59 kg/m  as calculated from the following:    Height as of this encounter: 1.759 m (5' 9.25\").    Weight as of this encounter: 88.5 kg (195 lb). Body surface area is 2.08 meters squared.  No Pain (0) Comment: Data Unavailable   No LMP for male patient.  Allergies reviewed: Yes  Medications reviewed: Yes    Medications: Medication refills not needed today.  Pharmacy name entered into [facility]: [id number] [place], [place] - [address]    Frailty Screening:   Is the patient here for a new oncology consult visit in cancer care? 2. No      Clinical concerns: New patient consult related to Anemia      MEGAN DUNLAP CMA            "

## 2024-02-12 NOTE — LETTER
"    2/12/2024         RE: Juan Fleming  [address]  [place] [address]        Dear Colleague,    Thank you for referring your patient, Juan Fleming, to the [facility]. Please see a copy of my visit note below.    Oncology Rooming Note    February 12, 2024 10:58 AM   Juan Fleming is a 68 year old male who presents for:    Chief Complaint   Patient presents with     Hematology     New patient consult related to Anemia     Initial Vitals: /67 (BP Location: Right arm, Patient Position: Sitting, Cuff Size: Adult Regular)   Pulse 53   Temp 97.3  F (36.3  C) (Tympanic)   Resp 20   Ht 1.759 m (5' 9.25\")   Wt 88.5 kg (195 lb)   SpO2 99%   BMI 28.59 kg/m   Estimated body mass index is 28.59 kg/m  as calculated from the following:    Height as of this encounter: 1.759 m (5' 9.25\").    Weight as of this encounter: 88.5 kg (195 lb). Body surface area is 2.08 meters squared.  No Pain (0) Comment: Data Unavailable   No LMP for male patient.  Allergies reviewed: Yes  Medications reviewed: Yes    Medications: Medication refills not needed today.  Pharmacy name entered into [employer]: [id number] - [place], [place] - [address] [place] [address]    Frailty Screening:   Is the patient here for a new oncology consult visit in cancer care? 2. No      Clinical concerns: New patient consult related to Anemia      MEGAN DUNLAP CMA              [facility] Hematology and Oncology Consult Note    Patient: Juan Fleming  MRN: [id number]  Date of Service: 02/12/2024      Reason for Visit    Chief Complaint   Patient presents with     Hematology     New patient consult related to Anemia       Assessment/Plan    Normocytic anemia    Patient has developed mild normocytic anemia over the past couple of years.  Normal white count, platelet count and WBC differential so a significant bone marrow process appears unlikely.    No evidence of nutritional deficiency.  No evidence of bleeding.  The more " recent drop in his hemoglobin is likely related to his recent surgery.    Will review morphology.  Agree with checking testosterone.  For now recommend observation.  Will see him back in 3 months with recheck of the CBC.    If he develops progressive anemia or other changes, the next step would be bone marrow aspirate and biopsy but this is not indicated at this time.    Questions answered.    Plan: Check labs as above  Follow-up in 3 months with recheck of CBC  Consider bone marrow if drop in hemoglobin less than 11.    ECOG Performance    0 - Independent    Problem List    Patient Active Problem List   Diagnosis     Benign prostatic hyperplasia     History of pulmonary embolism     Acrochordon     Anxiety     Alcohol abuse     Type 2 diabetes mellitus with complication, without long-term current use of insulin (H)     Hyperosmolarity due to secondary diabetes mellitus (H)     SINDI (acute kidney injury) (H24)     Hyponatremia     Dyslipidemia     Benign essential HTN     Bunion, left     Heart murmur     Onychomycosis     H/O small bowel obstruction     History of incisional hernia repair     ______________________________________________________________________________    Staging History    Cancer Staging   No matching staging information was found for the patient.      History    Patient is a 68-year-old with past history of BPH, pulmonary embolism, anxiety, type 2 diabetes, hyperlipidemia and hypertension who is referred for evaluation of anemia.    Just recently had major abdominal surgery for repair of previous hernia.  He has had a total of 4 abdominal surgeries over the last 35 years.    Noted to have mild progressive anemia over the last couple of years and last year was on oral iron for about 5 months with some slight improvement in the hemoglobin.    No previous history of anemia.  No previous blood transfusions.  No other time that he was on oral iron and never had parenteral iron.  No symptoms of  bleeding.  No change in his diet.  He had colonoscopy a year ago.  He has seen urology and had cystoscopy in the past and will be seeing them again soon.    No personal or family history of cancer.    He lives in [place] and is  with no kids.  Originally from [place].  He quit smoking in 2015 and prior to that had a 25-pack-year smoking history.  History of alcohol abuse but has been sober for 10 years.        Past History    Past Medical History:   Diagnosis Date     Alcohol abuse      Anemia      Anxiety      BPH (benign prostatic hyperplasia)      Colorectal polyps 02/01/2020    none high grade; repeat colonoscopy in 3 yrs - next 2/2026     Diabetes mellitus (H)      Hernia 09/07/2017    Ventral     History of pulmonary embolism      Hx SBO      Hypertension      Influenza      PE (pulmonary embolism)      Perforated bowel (H)      Ventral hernia without obstruction or gangrene     Family History   Problem Relation Age of Onset     Mental Illness Mother         depression and drugs/alcohol     Cerebrovascular Disease Father 94     CABG Father      Pulmonary Embolism Father      Osteoarthritis Father         hip replaced     Abdominal Aortic Aneurysm Father      Other - See Comments Maternal Aunt         cancer was found, then committed suicide     Dementia Maternal Aunt       Past Surgical History:   Procedure Laterality Date     ABDOMEN SURGERY      Colen resection     HERNIORRHAPHY VENTRAL N/A 11/3/2017    Procedure: REPAIR, HERNIA, VENTRAL, OPEN;  Surgeon: Maxine Rivera MD;  [place] [facility] [place]  Service:      HERNIORRHAPHY, INCISIONAL, ROBOT-ASSISTED, LAPAROSCOPIC, DA BABATUNDE XI, W/TRANSVERSE ABDOMINIS RELEASE N/A 12/12/2023    Procedure: HERNIORRHAPHY, INCISIONAL, ROBOT-ASSISTED, LAPAROSCOPIC, USING DA BABATUNDE XI with bilateral transversus abdominus release;  Surgeon: Bon Espinoza DO;  [place] [facility] [place]      Description: Appendectomy;  Recorded:  2008;     [facility] EXPLORATORY OF ABDOMEN N/A 11/3/2017    Procedure: LAPAROTOMY, LYSIS OF ADHESIONS, MESH EXPLANTATION VENTRAL HERNIA REPAIR WITH MESH ;  Surgeon: Maxine Rivera MD;  [place] [facility] [place]: [place]     [facility] REPAIR INCISIONAL HERNIA,REDUCIBLE      Description: Ventral Hernia Repair;  Recorded: 2008;     [facility] REPAIR UMBILICAL ERAN,<4Y/O,REDUC      Description: Umbilical Hernia Repair;  Recorded: 2008;    Social History     Socioeconomic History     Marital status:      Spouse name: Not on file     Number of children: Not on file     Years of education: Not on file     Highest education level: Not on file   Occupational History     Not on file   Tobacco Use     Smoking status: Former     Packs/day: 1.00     Years: 27.00     Additional pack years: 0.00     Total pack years: 27.00     Types: Cigarettes, Vaping Device     Start date:      Quit date: 2017     Years since quittin.1     Passive exposure: Past     Smokeless tobacco: Never     Tobacco comments:     Childhood exposure to parents smoking in home   Vaping Use     Vaping Use: Former     Start date: 2015     Quit date: 2017     Substances: Nicotine     Devices: Disposable, Pre-filled pod   Substance and Sexual Activity     Alcohol use: No     Comment: Alcoholic Drinks/day: quit drinkning      Drug use: No     Sexual activity: Not on file   Other Topics Concern     Not on file   Social History Narrative    As of 2023         No kids  Has a puppy        Lives in [place]. 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)- has a slip in [place], [place] on the [place]        Brought in full Health Care Directive 2022 - it was sent to scan onto his chart     Social Determinants of Health     Financial Resource Strain: Low Risk  (2023)    Financial Resource Strain      Within the past 12 months, have you or your family members you live with been unable to  "get utilities (heat, electricity) when it was really needed?: No   Food Insecurity: Low Risk  (12/1/2023)    Food Insecurity      Within the past 12 months, did you worry that your food would run out before you got money to buy more?: No      Within the past 12 months, did the food you bought just not last and you didn t have money to get more?: No   Transportation Needs: Low Risk  (12/1/2023)    Transportation Needs      Within the past 12 months, has lack of transportation kept you from medical appointments, getting your medicines, non-medical meetings or appointments, work, or from getting things that you need?: No   Physical Activity: Not on file   Stress: Not on file   Social Connections: Not on file   Interpersonal Safety: Low Risk  (10/20/2023)    Interpersonal Safety      Do you feel physically and emotionally safe where you currently live?: Yes      Within the past 12 months, have you been hit, slapped, kicked or otherwise physically hurt by someone?: No      Within the past 12 months, have you been humiliated or emotionally abused in other ways by your partner or ex-partner?: No   Housing Stability: Low Risk  (12/1/2023)    Housing Stability      Do you have housing? : Yes      Are you worried about losing your housing?: No        Allergies    No Known Allergies    Review of Systems    Pertinent items are noted in HPI.      Physical Exam        2/12/2024    10:54 AM   Oncology Vitals   Height 176 cm   Weight 88.451 kg   BSA (m2) 2.08 m2   /67   Pulse 53   Temp 97.3  F (36.3  C)   Temp src Tympanic   SpO2 99 %   Pain Score 0 (None)       /67 (BP Location: Right arm, Patient Position: Sitting, Cuff Size: Adult Regular)   Pulse 53   Temp 97.3  F (36.3  C) (Tympanic)   Resp 20   Ht 1.759 m (5' 9.25\")   Wt 88.5 kg (195 lb)   SpO2 99%   BMI 28.59 kg/m      General Appearance:    Alert, cooperative, no distress, appears stated age   Head:    Normocephalic, without obvious abnormality, " atraumatic   Eyes:    PERRL, conjunctiva/corneas clear, EOM's intact, fundi     benign, both eyes        Ears:    Normal TM's and external ear canals, both ears   Nose:   Nares normal, septum midline, mucosa normal, no drainage    or sinus tenderness   Throat:   Lips, mucosa, and tongue normal; teeth and gums normal   Neck:   Supple, symmetrical, trachea midline, no adenopathy;        thyroid:  No enlargement/tenderness/nodules; no carotid    bruit or JVD   Back:     Symmetric, no curvature, ROM normal, no CVA tenderness   Lungs:     Clear to auscultation bilaterally, respirations unlabored   Chest wall:    No tenderness or deformity   Heart:    Regular rate and rhythm, S1 and S2 normal, no murmur, rub   or gallop   Abdomen:     Soft, non-tender, bowel sounds active all four quadrants,     no masses, no organomegaly           Extremities:   Extremities normal, atraumatic, no cyanosis or edema   Pulses:   2+ and symmetric all extremities   Skin:   Skin color, texture, turgor normal, no rashes or lesions   Lymph nodes:   Cervical, supraclavicular, and axillary nodes normal   Neurologic:   CNII-XII intact. Normal strength, sensation and reflexes       throughout       Lab Results    Recent Results (from the past 168 hour(s))   CBC with platelets and differential   Result Value Ref Range    WBC Count 6.0 4.0 - 11.0 10e3/uL    RBC Count 4.29 (L) 4.40 - 5.90 10e6/uL    Hemoglobin 11.7 (L) 13.3 - 17.7 g/dL    Hematocrit 36.2 (L) 40.0 - 53.0 %    MCV 84 78 - 100 fL    MCH 27.3 26.5 - 33.0 pg    MCHC 32.3 31.5 - 36.5 g/dL    RDW 15.0 10.0 - 15.0 %    Platelet Count 221 150 - 450 10e3/uL    % Neutrophils 71 %    % Lymphocytes 21 %    % Monocytes 7 %    % Eosinophils 0 %    % Basophils 1 %    % Immature Granulocytes 0 %    NRBCs per 100 WBC 0 <1 /100    Absolute Neutrophils 4.2 1.6 - 8.3 10e3/uL    Absolute Lymphocytes 1.3 0.8 - 5.3 10e3/uL    Absolute Monocytes 0.4 0.0 - 1.3 10e3/uL    Absolute Eosinophils 0.0 0.0 - 0.7  10e3/uL    Absolute Basophils 0.1 0.0 - 0.2 10e3/uL    Absolute Immature Granulocytes 0.0 <=0.4 10e3/uL    Absolute NRBCs 0.0 10e3/uL   Reticulocyte count   Result Value Ref Range    % Reticulocyte 0.7 (L) 0.8 - 2.7 %    Absolute Reticulocyte 0.032 0.010 - 0.110 10e6/uL   Sex Hormone Binding Globulin   Result Value Ref Range    Sex Hormone Binding Globulin 61 11 - 80 nmol/L       Imaging Results    Recent CBC white count 5.5 hemoglobin 11.3 MCV 85 platelets 188 with hemoglobin range of 12-12.8 over the last year.  Iron saturation between 29 and 45%.  Ferritin 167.  LFTs normal.  BMP normal.  B12 normal at 796.  Urinalysis showing small amount of blood.      Signed by: Aurea Higgins MD      Again, thank you for allowing me to participate in the care of your patient.        Sincerely,        Aurea Higgins MD

## 2024-02-13 LAB
PATH REPORT.COMMENTS IMP SPEC: NORMAL
PATH REPORT.COMMENTS IMP SPEC: NORMAL
PATH REPORT.FINAL DX SPEC: NORMAL
PATH REPORT.RELEVANT HX SPEC: NORMAL

## 2024-02-13 PROCEDURE — 99207 BLOOD MORPHOLOGY PATHOLOGIST REVIEW: CPT | Performed by: PATHOLOGY

## 2024-02-13 NOTE — PROGRESS NOTES
[facility] Hematology and Oncology Consult Note    Patient: Juan Fleming  MRN: [id number]  Date of Service: 02/12/2024      Reason for Visit    Chief Complaint   Patient presents with    Hematology     New patient consult related to Anemia       Assessment/Plan    Normocytic anemia    Patient has developed mild normocytic anemia over the past couple of years.  Normal white count, platelet count and WBC differential so a significant bone marrow process appears unlikely.    No evidence of nutritional deficiency.  No evidence of bleeding.  The more recent drop in his hemoglobin is likely related to his recent surgery.    Will review morphology.  Agree with checking testosterone.  For now recommend observation.  Will see him back in 3 months with recheck of the CBC.    If he develops progressive anemia or other changes, the next step would be bone marrow aspirate and biopsy but this is not indicated at this time.    Questions answered.    Plan: Check labs as above  Follow-up in 3 months with recheck of CBC  Consider bone marrow if drop in hemoglobin less than 11.    ECOG Performance    0 - Independent    Problem List    Patient Active Problem List   Diagnosis    Benign prostatic hyperplasia    History of pulmonary embolism    Acrochordon    Anxiety    Alcohol abuse    Type 2 diabetes mellitus with complication, without long-term current use of insulin (H)    Hyperosmolarity due to secondary diabetes mellitus (H)    SINDI (acute kidney injury) (H24)    Hyponatremia    Dyslipidemia    Benign essential HTN    Bunion, left    Heart murmur    Onychomycosis    H/O small bowel obstruction    History of incisional hernia repair     ______________________________________________________________________________    Staging History    Cancer Staging   No matching staging information was found for the patient.      History    Patient is a 68-year-old with past history of BPH, pulmonary embolism, anxiety, type 2 diabetes,  hyperlipidemia and hypertension who is referred for evaluation of anemia.    Just recently had major abdominal surgery for repair of previous hernia.  He has had a total of 4 abdominal surgeries over the last 35 years.    Noted to have mild progressive anemia over the last couple of years and last year was on oral iron for about 5 months with some slight improvement in the hemoglobin.    No previous history of anemia.  No previous blood transfusions.  No other time that he was on oral iron and never had parenteral iron.  No symptoms of bleeding.  No change in his diet.  He had colonoscopy a year ago.  He has seen urology and had cystoscopy in the past and will be seeing them again soon.    No personal or family history of cancer.    He lives in [place] and is  with no kids.  Originally from [place].  He quit smoking in 2015 and prior to that had a 25-pack-year smoking history.  History of alcohol abuse but has been sober for 10 years.        Past History    Past Medical History:   Diagnosis Date    Alcohol abuse     Anemia     Anxiety     BPH (benign prostatic hyperplasia)     Colorectal polyps 02/01/2020    none high grade; repeat colonoscopy in 3 yrs - next 2/2026    Diabetes mellitus (H)     Hernia 09/07/2017    Ventral    History of pulmonary embolism     Hx SBO     Hypertension     Influenza     PE (pulmonary embolism)     Perforated bowel (H)     Ventral hernia without obstruction or gangrene     Family History   Problem Relation Age of Onset    Mental Illness Mother         depression and drugs/alcohol    Cerebrovascular Disease Father 94    CABG Father     Pulmonary Embolism Father     Osteoarthritis Father         hip replaced    Abdominal Aortic Aneurysm Father     Other - See Comments Maternal Aunt         cancer was found, then committed suicide    Dementia Maternal Aunt       Past Surgical History:   Procedure Laterality Date    ABDOMEN SURGERY      Colen resection    HERNIORRHAPHY VENTRAL  N/A 11/3/2017    Procedure: REPAIR, HERNIA, VENTRAL, OPEN;  Surgeon: Maxine Rivera MD;  Location: [facility];  Service:     HERNIORRHAPHY, INCISIONAL, ROBOT-ASSISTED, LAPAROSCOPIC, DA BABATUNDE XI, W/TRANSVERSE ABDOMINIS RELEASE N/A 2023    Procedure: HERNIORRHAPHY, INCISIONAL, ROBOT-ASSISTED, LAPAROSCOPIC, USING DA BABATUNDE XI with bilateral transversus abdominus release;  Surgeon: Bon Espinoza DO;  Location: [facility] [place] [place] APPENDECTOMY      Description: Appendectomy;  Recorded: 2008;    [facility] EXPLORATORY OF ABDOMEN N/A 11/3/2017    Procedure: LAPAROTOMY, LYSIS OF ADHESIONS, MESH EXPLANTATION VENTRAL HERNIA REPAIR WITH MESH ;  Surgeon: Maxine Rivera MD;  Location: [facility];  Service: General    [facility] REPAIR INCISIONAL HERNIA,REDUCIBLE      Description: Ventral Hernia Repair;  Recorded: 2008;    [facility] REPAIR UMBILICAL ERAN,<6Y/O,REDUC      Description: Umbilical Hernia Repair;  Recorded: 2008;    Social History     Socioeconomic History    Marital status:      Spouse name: Not on file    Number of children: Not on file    Years of education: Not on file    Highest education level: Not on file   Occupational History    Not on file   Tobacco Use    Smoking status: Former     Packs/day: 1.00     Years: 27.00     Additional pack years: 0.00     Total pack years: 27.00     Types: Cigarettes, Vaping Device     Start date:      Quit date: 2017     Years since quittin.1     Passive exposure: Past    Smokeless tobacco: Never    Tobacco comments:     Childhood exposure to parents smoking in home   Vaping Use    Vaping Use: Former    Start date: 2015    Quit date: 2017    Substances: Nicotine    Devices: Disposable, Pre-filled pod   Substance and Sexual Activity    Alcohol use: No     Comment: Alcoholic Drinks/day: quit drinkning     Drug use: No    Sexual activity: Not on file   Other Topics Concern    Not on file   Social History Narrative     As of 5/2023         No kids  Has a puppy        Lives in [place]. 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)- has a slip in [place], [place] on the [address]        Brought in full Health Care Directive 7/20/2022 - it was sent to scan onto his chart     Social Determinants of Health     Financial Resource Strain: Low Risk  (12/1/2023)    Financial Resource Strain     Within the past 12 months, have you or your family members you live with been unable to get utilities (heat, electricity) when it was really needed?: No   Food Insecurity: Low Risk  (12/1/2023)    Food Insecurity     Within the past 12 months, did you worry that your food would run out before you got money to buy more?: No     Within the past 12 months, did the food you bought just not last and you didn t have money to get more?: No   Transportation Needs: Low Risk  (12/1/2023)    Transportation Needs     Within the past 12 months, has lack of transportation kept you from medical appointments, getting your medicines, non-medical meetings or appointments, work, or from getting things that you need?: No   Physical Activity: Not on file   Stress: Not on file   Social Connections: Not on file   Interpersonal Safety: Low Risk  (10/20/2023)    Interpersonal Safety     Do you feel physically and emotionally safe where you currently live?: Yes     Within the past 12 months, have you been hit, slapped, kicked or otherwise physically hurt by someone?: No     Within the past 12 months, have you been humiliated or emotionally abused in other ways by your partner or ex-partner?: No   Housing Stability: Low Risk  (12/1/2023)    Housing Stability     Do you have housing? : Yes     Are you worried about losing your housing?: No        Allergies    No Known Allergies    Review of Systems    Pertinent items are noted in HPI.      Physical Exam        2/12/2024    10:54 AM   Oncology Vitals   Height 176 cm   Weight 88.451 kg   BSA (m2) 2.08  "m2   /67   Pulse 53   Temp 97.3  F (36.3  C)   Temp src Tympanic   SpO2 99 %   Pain Score 0 (None)       /67 (BP Location: Right arm, Patient Position: Sitting, Cuff Size: Adult Regular)   Pulse 53   Temp 97.3  F (36.3  C) (Tympanic)   Resp 20   Ht 1.759 m (5' 9.25\")   Wt 88.5 kg (195 lb)   SpO2 99%   BMI 28.59 kg/m      General Appearance:    Alert, cooperative, no distress, appears stated age   Head:    Normocephalic, without obvious abnormality, atraumatic   Eyes:    PERRL, conjunctiva/corneas clear, EOM's intact, fundi     benign, both eyes        Ears:    Normal TM's and external ear canals, both ears   Nose:   Nares normal, septum midline, mucosa normal, no drainage    or sinus tenderness   Throat:   Lips, mucosa, and tongue normal; teeth and gums normal   Neck:   Supple, symmetrical, trachea midline, no adenopathy;        thyroid:  No enlargement/tenderness/nodules; no carotid    bruit or JVD   Back:     Symmetric, no curvature, ROM normal, no CVA tenderness   Lungs:     Clear to auscultation bilaterally, respirations unlabored   Chest wall:    No tenderness or deformity   Heart:    Regular rate and rhythm, S1 and S2 normal, no murmur, rub   or gallop   Abdomen:     Soft, non-tender, bowel sounds active all four quadrants,     no masses, no organomegaly           Extremities:   Extremities normal, atraumatic, no cyanosis or edema   Pulses:   2+ and symmetric all extremities   Skin:   Skin color, texture, turgor normal, no rashes or lesions   Lymph nodes:   Cervical, supraclavicular, and axillary nodes normal   Neurologic:   CNII-XII intact. Normal strength, sensation and reflexes       throughout       Lab Results    Recent Results (from the past 168 hour(s))   CBC with platelets and differential   Result Value Ref Range    WBC Count 6.0 4.0 - 11.0 10e3/uL    RBC Count 4.29 (L) 4.40 - 5.90 10e6/uL    Hemoglobin 11.7 (L) 13.3 - 17.7 g/dL    Hematocrit 36.2 (L) 40.0 - 53.0 %    MCV 84 78 - " 100 fL    MCH 27.3 26.5 - 33.0 pg    MCHC 32.3 31.5 - 36.5 g/dL    RDW 15.0 10.0 - 15.0 %    Platelet Count 221 150 - 450 10e3/uL    % Neutrophils 71 %    % Lymphocytes 21 %    % Monocytes 7 %    % Eosinophils 0 %    % Basophils 1 %    % Immature Granulocytes 0 %    NRBCs per 100 WBC 0 <1 /100    Absolute Neutrophils 4.2 1.6 - 8.3 10e3/uL    Absolute Lymphocytes 1.3 0.8 - 5.3 10e3/uL    Absolute Monocytes 0.4 0.0 - 1.3 10e3/uL    Absolute Eosinophils 0.0 0.0 - 0.7 10e3/uL    Absolute Basophils 0.1 0.0 - 0.2 10e3/uL    Absolute Immature Granulocytes 0.0 <=0.4 10e3/uL    Absolute NRBCs 0.0 10e3/uL   Reticulocyte count   Result Value Ref Range    % Reticulocyte 0.7 (L) 0.8 - 2.7 %    Absolute Reticulocyte 0.032 0.010 - 0.110 10e6/uL   Sex Hormone Binding Globulin   Result Value Ref Range    Sex Hormone Binding Globulin 61 11 - 80 nmol/L       Imaging Results    Recent CBC white count 5.5 hemoglobin 11.3 MCV 85 platelets 188 with hemoglobin range of 12-12.8 over the last year.  Iron saturation between 29 and 45%.  Ferritin 167.  LFTs normal.  BMP normal.  B12 normal at 796.  Urinalysis showing small amount of blood.      Signed by: Aurea Higgins MD

## 2024-02-14 LAB
TESTOST FREE SERPL-MCNC: 7.8 NG/DL
TESTOST SERPL-MCNC: 557 NG/DL (ref 240–950)

## 2024-02-15 ENCOUNTER — PATIENT OUTREACH (OUTPATIENT)
Dept: ONCOLOGY | Facility: HOSPITAL | Age: 69
End: 2024-02-15
Payer: COMMERCIAL

## 2024-02-16 ENCOUNTER — OFFICE VISIT (OUTPATIENT)
Dept: FAMILY MEDICINE | Facility: CLINIC | Age: 69
End: 2024-02-16
Attending: FAMILY MEDICINE
Payer: COMMERCIAL

## 2024-02-16 VITALS
DIASTOLIC BLOOD PRESSURE: 70 MMHG | WEIGHT: 193 LBS | OXYGEN SATURATION: 100 % | SYSTOLIC BLOOD PRESSURE: 137 MMHG | HEIGHT: 70 IN | TEMPERATURE: 98.1 F | HEART RATE: 48 BPM | BODY MASS INDEX: 27.63 KG/M2 | RESPIRATION RATE: 20 BRPM

## 2024-02-16 DIAGNOSIS — Z63.79 STRESS DUE TO ILLNESS OF FAMILY MEMBER: ICD-10-CM

## 2024-02-16 DIAGNOSIS — F41.1 ANXIETY STATE: Chronic | ICD-10-CM

## 2024-02-16 DIAGNOSIS — F41.0 PANIC DISORDER WITHOUT AGORAPHOBIA: Primary | ICD-10-CM

## 2024-02-16 DIAGNOSIS — E78.5 DYSLIPIDEMIA: ICD-10-CM

## 2024-02-16 DIAGNOSIS — Z23 NEED FOR VACCINATION: ICD-10-CM

## 2024-02-16 DIAGNOSIS — I10 BENIGN ESSENTIAL HTN: ICD-10-CM

## 2024-02-16 DIAGNOSIS — N18.31 STAGE 3A CHRONIC KIDNEY DISEASE (H): ICD-10-CM

## 2024-02-16 DIAGNOSIS — E11.22 TYPE 2 DIABETES MELLITUS WITH CHRONIC KIDNEY DISEASE, WITHOUT LONG-TERM CURRENT USE OF INSULIN, UNSPECIFIED CKD STAGE (H): ICD-10-CM

## 2024-02-16 PROBLEM — E13.00: Status: RESOLVED | Noted: 2017-02-28 | Resolved: 2024-02-16

## 2024-02-16 PROCEDURE — 99215 OFFICE O/P EST HI 40 MIN: CPT | Performed by: FAMILY MEDICINE

## 2024-02-16 PROCEDURE — 99417 PROLNG OP E/M EACH 15 MIN: CPT | Performed by: FAMILY MEDICINE

## 2024-02-16 RX ORDER — ESCITALOPRAM OXALATE 5 MG/1
5 TABLET ORAL DAILY
Qty: 30 TABLET | Refills: 4 | Status: SHIPPED | OUTPATIENT
Start: 2024-02-16 | End: 2024-07-02

## 2024-02-16 SDOH — HEALTH STABILITY: PHYSICAL HEALTH: ON AVERAGE, HOW MANY DAYS PER WEEK DO YOU ENGAGE IN MODERATE TO STRENUOUS EXERCISE (LIKE A BRISK WALK)?: 7 DAYS

## 2024-02-16 SDOH — HEALTH STABILITY: PHYSICAL HEALTH: ON AVERAGE, HOW MANY MINUTES DO YOU ENGAGE IN EXERCISE AT THIS LEVEL?: 60 MIN

## 2024-02-16 ASSESSMENT — PATIENT HEALTH QUESTIONNAIRE - PHQ9
SUM OF ALL RESPONSES TO PHQ QUESTIONS 1-9: 9
10. IF YOU CHECKED OFF ANY PROBLEMS, HOW DIFFICULT HAVE THESE PROBLEMS MADE IT FOR YOU TO DO YOUR WORK, TAKE CARE OF THINGS AT HOME, OR GET ALONG WITH OTHER PEOPLE: NOT DIFFICULT AT ALL
SUM OF ALL RESPONSES TO PHQ QUESTIONS 1-9: 9

## 2024-02-16 ASSESSMENT — SOCIAL DETERMINANTS OF HEALTH (SDOH): HOW OFTEN DO YOU GET TOGETHER WITH FRIENDS OR RELATIVES?: ONCE A WEEK

## 2024-02-16 NOTE — PROGRESS NOTES
Preventive Care Visit  [facility] SEVEN Ng MD, Family Medicine  Feb 16, 2024      SUBJECTIVE:   Juan is a 68 year old, presenting for the following:  Wellness Visit (/)        2/16/2024     7:15 AM   Additional Questions   Roomed by Stanislav OLSEN  Hematology reviewed his situation with his hemoglobin and said to check labs and family history. There will be follow up labs in a few months, too.    When the issue with CBC came up, Juan admits he was worried - pacing, peeing - emotional. His wife asked what his mood was on a scale of 1-10. He said 3-4; had to fight to have energy just to shower and shave. He's never had this before. Went to a counselor (through [facility], due to his AA past) 13th - she said she had a sense he is mildly depressed. She thought an selective serotonin reuptake inhibitor would be OK. He's not so sure.  He's in AA. Had a sponsor but is in search of a new one. He thought about volunteering somewhere... instead of drugs.  Was used to working - year round. Now he's retired. He says he is not a movie person.  Will see counselor one more time.  Winter - walks 3 miles daily. In the summer he's on his boat all day every day.    He admits his demeanor can change rapidly    Seeing some fishing buddies recently.  Realizes that hearing from / interacting with others is quite helpful.     Prostate issues - sees urology 2/28. Took a med that costs $400 per bottle; started it and initially it did not help, but just recently it has helped. Now he is only urinating 2 times per night.  He will still see urology - and see what they say.  Previous doc (Erick) was difficult to understand - Flomax and oxybutinin - did not work. Clayton of a friend on a med that really helped. He is hoping to have a doc with better communication skills this time.    Thanks for My Chart responses/interactions.    Had Covid shot in [facility] 9/2023.  Attends AA (main exposure to community infections). Should  he get a booster Covid?      Have you ever done Advance Care Planning? (For example, a Health Directive, POLST, or a discussion with a medical provider or your loved ones about your wishes): Yes, advance care planning is on file.    Fall risk  Fallen 2 or more times in the past year?: No  click delete button to remove this line now  Cognitive Screening   1) Repeat 3 items (Leader, Season, Table)    2) Clock draw: NORMAL  3) 3 item recall: Recalls 3 objects  Results: NORMAL clock, 1-2 items recalled: COGNITIVE IMPAIRMENT LESS LIKELY    Mini-CogTM Copyright S [employer]. Licensed by the author for use in [facility]; reprinted with permission (jackson@[email or web address]). All rights reserved.      Do you have sleep apnea, excessive snoring or daytime drowsiness? : no    Reviewed and updated as needed this visit by clinical staff   Tobacco  Allergies  Meds     Fam Hx          Reviewed and updated as needed this visit by Provider     Meds     Fam Hx          Social History     Tobacco Use    Smoking status: Former     Packs/day: 1.00     Years: 27.00     Additional pack years: 0.00     Total pack years: 27.00     Types: Cigarettes, Vaping Device     Start date:      Quit date: 2017     Years since quittin.1     Passive exposure: Past    Smokeless tobacco: Never    Tobacco comments:     Childhood exposure to parents smoking in home   Substance Use Topics    Alcohol use: No     Comment: Alcoholic Drinks/day: quit drinkning 2024     7:11 AM   Alcohol Use   Prescreen: >3 drinks/day or >7 drinks/week? Not Applicable          No data to display              Do you have a current opioid prescription? No  Do you use any other controlled substances or medications that are not prescribed by a provider? None    Current providers sharing in care for this patient include:   Patient Care Team:  Martine Ng MD as PCP - General (Family Medicine)  Martine Ng MD as Assigned PCP  Olga  Bon Trujillo DO as Assigned Surgical Provider  Aurea Higgins MD as MD (Hematology)  Zayda Soria, RN as Specialty Care Coordinator (Hematology & Oncology)    The following health maintenance items are reviewed in Epic and correct as of today:  Health Maintenance   Topic Date Due    MEDICARE ANNUAL WELLNESS VISIT  05/25/2022    COVID-19 Vaccine (7 - 2023-24 season) 09/01/2023    LIPID  06/06/2024    MICROALBUMIN  06/06/2024    DIABETIC FOOT EXAM  06/06/2024    A1C  07/22/2024    EYE EXAM  09/25/2024    LUNG CANCER SCREENING  10/17/2024    BMP  11/28/2024    ANNUAL REVIEW OF HM ORDERS  12/02/2024    HEMOGLOBIN  02/12/2025    FALL RISK ASSESSMENT  02/16/2025    ADVANCE CARE PLANNING  02/16/2029    COLORECTAL CANCER SCREENING  02/27/2033    DTAP/TDAP/TD IMMUNIZATION (4 - Td or Tdap) 10/17/2033    HEPATITIS C SCREENING  Completed    PHQ-2 (once per calendar year)  Completed    INFLUENZA VACCINE  Completed    Pneumococcal Vaccine: 65+ Years  Completed    URINALYSIS  Completed    ZOSTER IMMUNIZATION  Completed    RSV VACCINE (Pregnancy & 60+)  Completed    AORTIC ANEURYSM SCREENING (SYSTEM ASSIGNED)  Completed    IPV IMMUNIZATION  Aged Out    HPV IMMUNIZATION  Aged Out    MENINGITIS IMMUNIZATION  Aged Out    RSV MONOCLONAL ANTIBODY  Aged Out     Labs reviewed in EPIC  BP Readings from Last 3 Encounters:   02/16/24 137/70   02/12/24 135/67   12/13/23 118/61    Wt Readings from Last 3 Encounters:   02/16/24 87.5 kg (193 lb)   02/12/24 88.5 kg (195 lb)   12/12/23 93.4 kg (205 lb 14.6 oz)                  Patient Active Problem List   Diagnosis    Benign prostatic hyperplasia    History of pulmonary embolism    Acrochordon    Anxiety    Alcohol abuse    Type 2 diabetes mellitus with chronic kidney disease, without long-term current use of insulin, unspecified CKD stage (H)    SINDI (acute kidney injury) (H24)    Hyponatremia    Dyslipidemia    Benign essential HTN    Bunion, left    Heart murmur    Onychomycosis     H/O small bowel obstruction    History of incisional hernia repair    Stage 3a chronic kidney disease (H)     Past Surgical History:   Procedure Laterality Date    ABDOMEN SURGERY      Colen resection    HERNIORRHAPHY VENTRAL N/A 11/3/2017    Procedure: REPAIR, HERNIA, VENTRAL, OPEN;  Surgeon: Maxine iRvera MD;  Location: [facility];  Service:     HERNIORRHAPHY, INCISIONAL, ROBOT-ASSISTED, LAPAROSCOPIC, DA BABATUNDE XI, W/TRANSVERSE ABDOMINIS RELEASE N/A 2023    Procedure: HERNIORRHAPHY, INCISIONAL, ROBOT-ASSISTED, LAPAROSCOPIC, USING DA BABATUNDE XI with bilateral transversus abdominus release;  Surgeon: Bon Espinoza DO;  Location: [facility] [place] APPENDECTOMY      Description: Appendectomy;  Recorded: 2008;    [facility] EXPLORATORY OF ABDOMEN N/A 11/3/2017    Procedure: LAPAROTOMY, LYSIS OF ADHESIONS, MESH EXPLANTATION VENTRAL HERNIA REPAIR WITH MESH ;  Surgeon: Maxine Rivera MD;  Location: [facility];  Service: General    [facility] REPAIR INCISIONAL HERNIA,REDUCIBLE      Description: Ventral Hernia Repair;  Recorded: 2008;    [facility] REPAIR UMBILICAL ERAN,<4Y/O,REDUC      Description: Umbilical Hernia Repair;  Recorded: 2008;       Social History     Tobacco Use    Smoking status: Former     Packs/day: 1.00     Years: 27.00     Additional pack years: 0.00     Total pack years: 27.00     Types: Cigarettes, Vaping Device     Start date:      Quit date: 2017     Years since quittin.1     Passive exposure: Past    Smokeless tobacco: Never    Tobacco comments:     Childhood exposure to parents smoking in home   Substance Use Topics    Alcohol use: No     Comment: Alcoholic Drinks/day: quit drinkning      Family History   Problem Relation Age of Onset    Mental Illness Mother         depression and drugs/alcohol    Suicide Mother 48    Cerebrovascular Disease Father 94    CABG Father     Pulmonary Embolism Father     Osteoarthritis Father         hip replaced     "Abdominal Aortic Aneurysm Father     Multiple Sclerosis Sister     Mental Illness Sister     Other - See Comments Maternal Aunt         cancer was found, then committed suicide    Dementia Maternal Aunt          Current Outpatient Medications   Medication Sig Dispense Refill    acetaminophen (TYLENOL) 500 MG tablet Take 1-2 tablets (500-1,000 mg) by mouth every 6 hours as needed for mild pain      atorvastatin (LIPITOR) 10 MG tablet Take 1 tablet (10 mg total) by mouth daily. 90 tablet 4    cetirizine (ZYRTEC) 10 MG tablet Take 10 mg by mouth as needed      cyanocobalamin (VITAMIN B-12) 1000 MCG tablet Take 1,000 mcg by mouth daily      escitalopram (LEXAPRO) 5 MG tablet Take 1 tablet (5 mg) by mouth daily 30 tablet 4    lisinopril (ZESTRIL) 10 MG tablet Take 1 tablet (10 mg) by mouth daily 90 tablet 3    psyllium (METAMUCIL/KONSYL) Packet Take 1 packet by mouth daily      vibegron (GEMTESA) 75 MG TABS tablet Take 1 tablet (75 mg) by mouth daily 30 tablet 4    vitamin C (ASCORBIC ACID) 500 MG tablet Take 500 mg by mouth daily      blood glucose (CONTOUR NEXT TEST) test strip Use 1 each to test blood glucose  As Directed Daily at 8:00 am 200 strip 3    Microlet Lancets MISC Use 1 each to test blood glucose As Directed Daily at 8:00 am 100 each 11     No Known Allergies    Review of Systems       OBJECTIVE:   /70   Pulse (!) 48   Temp 98.1  F (36.7  C) (Oral)   Resp 20   Ht 1.765 m (5' 9.5\")   Wt 87.5 kg (193 lb)   SpO2 100%   BMI 28.09 kg/m     Estimated body mass index is 28.09 kg/m  as calculated from the following:    Height as of this encounter: 1.765 m (5' 9.5\").    Weight as of this encounter: 87.5 kg (193 lb).  Physical Exam  GENERAL: alert and no distress  SKIN: no suspicious lesions or rashes  PSYCH: mentation appears normal, affect normal/bright, judgement and insight intact, appearance well groomed, and readily offers insightful comments, self-awareness and does not hide issues      ASSESSMENT " / PLAN:   Panic disorder without agoraphobia  I encouraged Juan that he is doing a lot already to help mild depression (per counselor) and with exercise. I think trying a very low dose anti-depressant could be a really good idea. Perhaps he needs to take something through the winter months?  It is very unlikely to hurt and well might help. He agreed to try a med now for a couple months - he'll send a [employer] message about how he's doing and we'll go from there.  - escitalopram (LEXAPRO) 5 MG tablet  Dispense: 30 tablet; Refill: 4    Anxiety  As above; keep up regular exercise. Keep seeing friends for breakfast.    Benign essential HTN  Currently under good control with lisinopril 10mg. Will follow this as he is now sleeping better which could help the BP.  - PRIMARY CARE FOLLOW-UP SCHEDULING    Dyslipidemia  On atorvastatin 10mg  - PRIMARY CARE FOLLOW-UP SCHEDULING    Stage 3a chronic kidney disease (H)  stable    Type 2 diabetes mellitus with chronic kidney disease, without long-term current use of insulin, unspecified CKD stage (H)  A1-C last month was 5.7. super! On no meds    Stress due to illness of family member  His own illness has added to his stress, but he tries to keep it in perspective. I am very glad he is seeing a counselor    He reports that he quit smoking about 7 years ago. His smoking use included cigarettes and vaping device. He started smoking about 56 years ago. He has a 27 pack-year smoking history. He has been exposed to tobacco smoke. He has never used smokeless tobacco.    70 min spent with patient and doing labs, checking the chart, etc on the day of service 2/16/2024.    Appropriate preventive services were discussed with this patient, including applicable screening as appropriate for fall prevention, nutrition, physical activity, Tobacco-use cessation, weight loss and cognition.  Checklist reviewing preventive services available has been given to the patient.    On the day of service, I  spent 70 minutes with the patient, preparing for the visit with chart review, and charting.      Signed Electronically by: Martine Ng MD    Identified Health Risks    Answers submitted by the patient for this visit:  Patient Health Questionnaire (Submitted on 2/16/2024)  If you checked off any problems, how difficult have these problems made it for you to do your work, take care of things at home, or get along with other people?: Not difficult at all  PHQ9 TOTAL SCORE: 9

## 2024-02-28 ENCOUNTER — TRANSFERRED RECORDS (OUTPATIENT)
Dept: HEALTH INFORMATION MANAGEMENT | Facility: CLINIC | Age: 69
End: 2024-02-28
Payer: COMMERCIAL

## 2024-05-09 DIAGNOSIS — D64.9 NORMOCYTIC ANEMIA: Primary | ICD-10-CM

## 2024-05-13 ENCOUNTER — ONCOLOGY VISIT (OUTPATIENT)
Dept: ONCOLOGY | Facility: HOSPITAL | Age: 69
End: 2024-05-13
Attending: NURSE PRACTITIONER
Payer: COMMERCIAL

## 2024-05-13 ENCOUNTER — PATIENT OUTREACH (OUTPATIENT)
Dept: ONCOLOGY | Facility: HOSPITAL | Age: 69
End: 2024-05-13

## 2024-05-13 ENCOUNTER — LAB (OUTPATIENT)
Dept: INFUSION THERAPY | Facility: HOSPITAL | Age: 69
End: 2024-05-13
Attending: NURSE PRACTITIONER
Payer: COMMERCIAL

## 2024-05-13 VITALS
OXYGEN SATURATION: 97 % | HEART RATE: 52 BPM | DIASTOLIC BLOOD PRESSURE: 70 MMHG | SYSTOLIC BLOOD PRESSURE: 130 MMHG | HEIGHT: 70 IN | WEIGHT: 200.6 LBS | TEMPERATURE: 97.8 F | RESPIRATION RATE: 12 BRPM | BODY MASS INDEX: 28.72 KG/M2

## 2024-05-13 DIAGNOSIS — D64.9 NORMOCYTIC ANEMIA: ICD-10-CM

## 2024-05-13 LAB
BASOPHILS # BLD AUTO: 0.1 10E3/UL (ref 0–0.2)
BASOPHILS NFR BLD AUTO: 1 %
EOSINOPHIL # BLD AUTO: 0.1 10E3/UL (ref 0–0.7)
EOSINOPHIL NFR BLD AUTO: 1 %
ERYTHROCYTE [DISTWIDTH] IN BLOOD BY AUTOMATED COUNT: 14.6 % (ref 10–15)
HCT VFR BLD AUTO: 35.8 % (ref 40–53)
HGB BLD-MCNC: 11.5 G/DL (ref 13.3–17.7)
IMM GRANULOCYTES # BLD: 0 10E3/UL
IMM GRANULOCYTES NFR BLD: 0 %
LYMPHOCYTES # BLD AUTO: 1.4 10E3/UL (ref 0.8–5.3)
LYMPHOCYTES NFR BLD AUTO: 29 %
MCH RBC QN AUTO: 27 PG (ref 26.5–33)
MCHC RBC AUTO-ENTMCNC: 32.1 G/DL (ref 31.5–36.5)
MCV RBC AUTO: 84 FL (ref 78–100)
MONOCYTES # BLD AUTO: 0.5 10E3/UL (ref 0–1.3)
MONOCYTES NFR BLD AUTO: 10 %
NEUTROPHILS # BLD AUTO: 2.9 10E3/UL (ref 1.6–8.3)
NEUTROPHILS NFR BLD AUTO: 59 %
NRBC # BLD AUTO: 0 10E3/UL
NRBC BLD AUTO-RTO: 0 /100
PLATELET # BLD AUTO: 193 10E3/UL (ref 150–450)
RBC # BLD AUTO: 4.26 10E6/UL (ref 4.4–5.9)
WBC # BLD AUTO: 4.9 10E3/UL (ref 4–11)

## 2024-05-13 PROCEDURE — G2211 COMPLEX E/M VISIT ADD ON: HCPCS | Performed by: NURSE PRACTITIONER

## 2024-05-13 PROCEDURE — G0463 HOSPITAL OUTPT CLINIC VISIT: HCPCS | Performed by: NURSE PRACTITIONER

## 2024-05-13 PROCEDURE — 85049 AUTOMATED PLATELET COUNT: CPT

## 2024-05-13 PROCEDURE — 36415 COLL VENOUS BLD VENIPUNCTURE: CPT

## 2024-05-13 PROCEDURE — 99213 OFFICE O/P EST LOW 20 MIN: CPT | Performed by: NURSE PRACTITIONER

## 2024-05-13 ASSESSMENT — PAIN SCALES - GENERAL: PAINLEVEL: NO PAIN (0)

## 2024-05-13 NOTE — PROGRESS NOTES
[facility] Hematology and Oncology Progress Note    Patient: Juan Fleming  MRN: [id number]  Date of Service: 05/13/2024        Reason for Visit    Chief Complaint   Patient presents with    Oncology Clinic Visit     3 month return visit with labs related to Normocytic anemia.       Assessment and Plan     Cancer Staging   No matching staging information was found for the patient.    1.  Normocytic anemia: This has slowly gotten worse over the last 5 years.  It is mild.  His white blood cell count and platelet count are normal.  He was evaluated for nutritional deficiencies, bleeding and other allergies and none were found.  He could have some very mild chronic disease with mild kidney dysfunction.  At this point he still has a very mild anemia.  Definitely not getting worse.  No need for any further intervention.  We talked briefly about the fact that we do not get too concerned until this consistently drops further and starts getting closer to 10.  I did talk to him about a potential bone marrow biopsy if things get worse.  Patient is going to be seen his primary care doctor in July and states he will likely get labs there.  I will see him 3 months later with repeat labs so in October    ECOG Performance    0 - Independent    Distress Screening (within last 30 days)    1. How concerned are you about your ability to eat? : 0  2. How concerned are you about unintended weight loss or your current weight? : 0  3. How concerned are you about feeling depressed or very sad? : 0  4. How concerned are you about feeling anxious or very scared? : 0  5. Do you struggle with the loss of meaning and stephane in your life? : Not at all  6. How concerned are you about work and home life issues that may be affected by your cancer? : 0  7. How concerned are you about knowing what resources are available to help you? : 0  8. Do you currently have what you would describe as Religion or spiritual struggles?: Not at all        Pain  Pain Score: No Pain (0)    Problem List    Patient Active Problem List   Diagnosis    Benign prostatic hyperplasia    History of pulmonary embolism    Acrochordon    Anxiety    Alcohol abuse    Type 2 diabetes mellitus with chronic kidney disease, without long-term current use of insulin, unspecified CKD stage (H)    SINDI (acute kidney injury) (H24)    Hyponatremia    Dyslipidemia    Benign essential HTN    Bunion, left    Heart murmur    Onychomycosis    H/O small bowel obstruction    History of incisional hernia repair    Stage 3a chronic kidney disease (H)        ______________________________________________________________________________    History of Present Illness    Hematologist: Dr. Higgins    Diagnosis: Anemia, normocytic.  Mild anemia has been going on really since 2019.  Slowly decreasing.  No evidence of bleeding.  Morphology okay and showed some mild ineffective erythropoiesis.  Could be from the globin synthesis or anemia of chronic disease.  Testosterone okay.  Nutritional deficiencies were negative.  Does have some mild intermittent kidney dysfunction with creatinine, up to 1.31 about a year ago.  Intermittently abnormal and intermittently normal.    Treatment:  Observation    Interim history:  Patient is here today for 3-month follow-up visit and to review CBC.  Overall he states he feels about the same.  Has not had any surgeries.  Denies any new bone or back pain.  Denies any sniffing and fatigue issues.  Denies any bleeding complications.  Denies any shortness of breath.  Still pretty active.    Review of Systems    Pertinent items are noted in HPI.    Past History    Past Medical History:   Diagnosis Date    Alcohol abuse, in remission     Anemia     Anxiety     BPH (benign prostatic hyperplasia)     Colorectal polyps 02/01/2020    none high grade; repeat colonoscopy in 3 yrs - next 2/2026    Diabetes mellitus (H)     Hernia 09/07/2017    Ventral    History of pulmonary embolism      "Hx SBO     Hypertension     Influenza     PE (pulmonary embolism)     Perforated bowel (H)     Ventral hernia without obstruction or gangrene        PHYSICAL EXAM  /70 (BP Location: Right arm, Patient Position: Sitting, Cuff Size: Adult Regular)   Pulse 52   Temp 97.8  F (36.6  C) (Tympanic)   Resp 12   Ht 1.765 m (5' 9.5\")   Wt 91 kg (200 lb 9.6 oz)   SpO2 97%   BMI 29.20 kg/m      GENERAL: no acute distress. Cooperative in conversation. Alone in clinic  RESP: Regular respiratory rate. No expiratory wheezes   NEURO: non focal. Alert and oriented x3.   PSYCH: within normal limits. No depression or anxiety.  SKIN: exposed skin is dry intact.     Lab Results    Recent Results (from the past 168 hour(s))   CBC with platelets and differential   Result Value Ref Range    WBC Count 4.9 4.0 - 11.0 10e3/uL    RBC Count 4.26 (L) 4.40 - 5.90 10e6/uL    Hemoglobin 11.5 (L) 13.3 - 17.7 g/dL    Hematocrit 35.8 (L) 40.0 - 53.0 %    MCV 84 78 - 100 fL    MCH 27.0 26.5 - 33.0 pg    MCHC 32.1 31.5 - 36.5 g/dL    RDW 14.6 10.0 - 15.0 %    Platelet Count 193 150 - 450 10e3/uL    % Neutrophils 59 %    % Lymphocytes 29 %    % Monocytes 10 %    % Eosinophils 1 %    % Basophils 1 %    % Immature Granulocytes 0 %    NRBCs per 100 WBC 0 <1 /100    Absolute Neutrophils 2.9 1.6 - 8.3 10e3/uL    Absolute Lymphocytes 1.4 0.8 - 5.3 10e3/uL    Absolute Monocytes 0.5 0.0 - 1.3 10e3/uL    Absolute Eosinophils 0.1 0.0 - 0.7 10e3/uL    Absolute Basophils 0.1 0.0 - 0.2 10e3/uL    Absolute Immature Granulocytes 0.0 <=0.4 10e3/uL    Absolute NRBCs 0.0 10e3/uL       Imaging    No results found.    The longitudinal plan of care for the diagnosis(es)/condition(s) as documented were addressed during this visit. Due to the added complexity in care, I will continue to support Juan in the subsequent management and with ongoing continuity of care.      Signed by: Tana Slater, APRN CNP  "

## 2024-05-13 NOTE — PROGRESS NOTES
"Oncology Rooming Note    May 13, 2024 10:29 AM   Juan Fleming is a 69 year old male who presents for:    Chief Complaint   Patient presents with    Oncology Clinic Visit     3 month return visit with labs related to Normocytic anemia.     Initial Vitals: /70 (BP Location: Right arm, Patient Position: Sitting, Cuff Size: Adult Regular)   Pulse 52   Temp 97.8  F (36.6  C) (Tympanic)   Resp 12   Ht 1.765 m (5' 9.5\")   Wt 91 kg (200 lb 9.6 oz)   SpO2 97%   BMI 29.20 kg/m   Estimated body mass index is 29.2 kg/m  as calculated from the following:    Height as of this encounter: 1.765 m (5' 9.5\").    Weight as of this encounter: 91 kg (200 lb 9.6 oz). Body surface area is 2.11 meters squared.  No Pain (0) Comment: Data Unavailable   No LMP for male patient.  Allergies reviewed: Yes  Medications reviewed: Yes    Medications: Medication refills not needed today.  Pharmacy name entered into [facility]: [id number] - [place], [place] - [address]    Frailty Screening:   Is the patient here for a new oncology consult visit in cancer care? 2. No      Clinical concerns: none.       Sophy Powers CMA              "

## 2024-05-13 NOTE — PROGRESS NOTES
[facility]: Cancer Care                                                                                          Situation: Chart reviewed by RN Care Coordinator.    Background: Juan was seen in clinic today for follow-up regarding normocytic anemia.    Assessment: Today's lab work is normal.  At this point he has very mild anemia.  Definitely not getting worse.    Plan/Recommendations: We will see him back in clinic in 3 months with labs.    Labs to be done at PCP office in July.       Signature:  Zayda Soria  RN Care Coordinator  [facility]  Cancer [facility]

## 2024-05-13 NOTE — LETTER
"    5/13/2024         RE: Juan Fleming  [address]  [place] [address]        Dear Colleague,    Thank you for referring your patient, Juan Fleming, to the [facility] [place]. Please see a copy of my visit note below.    Oncology Rooming Note    May 13, 2024 10:29 AM   Juan Fleming is a 69 year old male who presents for:    Chief Complaint   Patient presents with     Oncology Clinic Visit     3 month return visit with labs related to Normocytic anemia.     Initial Vitals: /70 (BP Location: Right arm, Patient Position: Sitting, Cuff Size: Adult Regular)   Pulse 52   Temp 97.8  F (36.6  C) (Tympanic)   Resp 12   Ht 1.765 m (5' 9.5\")   Wt 91 kg (200 lb 9.6 oz)   SpO2 97%   BMI 29.20 kg/m   Estimated body mass index is 29.2 kg/m  as calculated from the following:    Height as of this encounter: 1.765 m (5' 9.5\").    Weight as of this encounter: 91 kg (200 lb 9.6 oz). Body surface area is 2.11 meters squared.  No Pain (0) Comment: Data Unavailable   No LMP for male patient.  Allergies reviewed: Yes  Medications reviewed: Yes    Medications: Medication refills not needed today.  Pharmacy name entered into [facility]: [id number] - [place], [place] - [address]    Frailty Screening:   Is the patient here for a new oncology consult visit in cancer care? 2. No      Clinical concerns: none.       Sophy Powers, [facility] Hematology and Oncology Progress Note    Patient: Juan Fleming  MRN: [id number]  Date of Service: 05/13/2024        Reason for Visit    Chief Complaint   Patient presents with     Oncology Clinic Visit     3 month return visit with labs related to Normocytic anemia.       Assessment and Plan     Cancer Staging   No matching staging information was found for the patient.    1.  Normocytic anemia: This has slowly gotten worse over the last 5 years.  It is mild.  His white blood cell count and platelet count are normal.  He was " evaluated for nutritional deficiencies, bleeding and other allergies and none were found.  He could have some very mild chronic disease with mild kidney dysfunction.  At this point he still has a very mild anemia.  Definitely not getting worse.  No need for any further intervention.  We talked briefly about the fact that we do not get too concerned until this consistently drops further and starts getting closer to 10.  I did talk to him about a potential bone marrow biopsy if things get worse.  Patient is going to be seen his primary care doctor in July and states he will likely get labs there.  I will see him 3 months later with repeat labs so in October    ECOG Performance    0 - Independent    Distress Screening (within last 30 days)    1. How concerned are you about your ability to eat? : 0  2. How concerned are you about unintended weight loss or your current weight? : 0  3. How concerned are you about feeling depressed or very sad? : 0  4. How concerned are you about feeling anxious or very scared? : 0  5. Do you struggle with the loss of meaning and stephane in your life? : Not at all  6. How concerned are you about work and home life issues that may be affected by your cancer? : 0  7. How concerned are you about knowing what resources are available to help you? : 0  8. Do you currently have what you would describe as Yarsanism or spiritual struggles?: Not at all       Pain  Pain Score: No Pain (0)    Problem List    Patient Active Problem List   Diagnosis     Benign prostatic hyperplasia     History of pulmonary embolism     Acrochordon     Anxiety     Alcohol abuse     Type 2 diabetes mellitus with chronic kidney disease, without long-term current use of insulin, unspecified CKD stage (H)     SINDI (acute kidney injury) (H24)     Hyponatremia     Dyslipidemia     Benign essential HTN     Bunion, left     Heart murmur     Onychomycosis     H/O small bowel obstruction     History of incisional hernia repair      "Stage 3a chronic kidney disease (H)        ______________________________________________________________________________    History of Present Illness    Hematologist: Dr. Higgins    Diagnosis: Anemia, normocytic.  Mild anemia has been going on really since 2019.  Slowly decreasing.  No evidence of bleeding.  Morphology okay and showed some mild ineffective erythropoiesis.  Could be from the globin synthesis or anemia of chronic disease.  Testosterone okay.  Nutritional deficiencies were negative.  Does have some mild intermittent kidney dysfunction with creatinine, up to 1.31 about a year ago.  Intermittently abnormal and intermittently normal.    Treatment:  Observation    Interim history:  Patient is here today for 3-month follow-up visit and to review CBC.  Overall he states he feels about the same.  Has not had any surgeries.  Denies any new bone or back pain.  Denies any sniffing and fatigue issues.  Denies any bleeding complications.  Denies any shortness of breath.  Still pretty active.    Review of Systems    Pertinent items are noted in HPI.    Past History    Past Medical History:   Diagnosis Date     Alcohol abuse, in remission      Anemia      Anxiety      BPH (benign prostatic hyperplasia)      Colorectal polyps 02/01/2020    none high grade; repeat colonoscopy in 3 yrs - next 2/2026     Diabetes mellitus (H)      Hernia 09/07/2017    Ventral     History of pulmonary embolism      Hx SBO      Hypertension      Influenza      PE (pulmonary embolism)      Perforated bowel (H)      Ventral hernia without obstruction or gangrene        PHYSICAL EXAM  /70 (BP Location: Right arm, Patient Position: Sitting, Cuff Size: Adult Regular)   Pulse 52   Temp 97.8  F (36.6  C) (Tympanic)   Resp 12   Ht 1.765 m (5' 9.5\")   Wt 91 kg (200 lb 9.6 oz)   SpO2 97%   BMI 29.20 kg/m      GENERAL: no acute distress. Cooperative in conversation. Alone in clinic  RESP: Regular respiratory rate. No expiratory " wheezes   NEURO: non focal. Alert and oriented x3.   PSYCH: within normal limits. No depression or anxiety.  SKIN: exposed skin is dry intact.     Lab Results    Recent Results (from the past 168 hour(s))   CBC with platelets and differential   Result Value Ref Range    WBC Count 4.9 4.0 - 11.0 10e3/uL    RBC Count 4.26 (L) 4.40 - 5.90 10e6/uL    Hemoglobin 11.5 (L) 13.3 - 17.7 g/dL    Hematocrit 35.8 (L) 40.0 - 53.0 %    MCV 84 78 - 100 fL    MCH 27.0 26.5 - 33.0 pg    MCHC 32.1 31.5 - 36.5 g/dL    RDW 14.6 10.0 - 15.0 %    Platelet Count 193 150 - 450 10e3/uL    % Neutrophils 59 %    % Lymphocytes 29 %    % Monocytes 10 %    % Eosinophils 1 %    % Basophils 1 %    % Immature Granulocytes 0 %    NRBCs per 100 WBC 0 <1 /100    Absolute Neutrophils 2.9 1.6 - 8.3 10e3/uL    Absolute Lymphocytes 1.4 0.8 - 5.3 10e3/uL    Absolute Monocytes 0.5 0.0 - 1.3 10e3/uL    Absolute Eosinophils 0.1 0.0 - 0.7 10e3/uL    Absolute Basophils 0.1 0.0 - 0.2 10e3/uL    Absolute Immature Granulocytes 0.0 <=0.4 10e3/uL    Absolute NRBCs 0.0 10e3/uL       Imaging    No results found.    The longitudinal plan of care for the diagnosis(es)/condition(s) as documented were addressed during this visit. Due to the added complexity in care, I will continue to support Juan in the subsequent management and with ongoing continuity of care.      Signed by: PATY Gomes CNP      Again, thank you for allowing me to participate in the care of your patient.        Sincerely,        PATY Gomes CNP

## 2024-05-14 ENCOUNTER — TELEPHONE (OUTPATIENT)
Dept: FAMILY MEDICINE | Facility: CLINIC | Age: 69
End: 2024-05-14
Payer: COMMERCIAL

## 2024-05-14 NOTE — TELEPHONE ENCOUNTER
Patient Quality Outreach    Patient is due for the following:   Hypertension -  Hypertension follow-up visit    Next Steps:   Patient has upcoming appointment, these items will be addressed at that time.    Type of outreach:    Chart review performed, no outreach needed.    Questions for provider review:    None           Celina Emery RN

## 2024-06-21 DIAGNOSIS — I10 BENIGN ESSENTIAL HTN: ICD-10-CM

## 2024-06-21 DIAGNOSIS — Z76.0 ENCOUNTER FOR MEDICATION REFILL: ICD-10-CM

## 2024-06-21 RX ORDER — LISINOPRIL 10 MG/1
10 TABLET ORAL DAILY
Qty: 90 TABLET | Refills: 0 | Status: SHIPPED | OUTPATIENT
Start: 2024-06-21 | End: 2024-09-03

## 2024-07-01 DIAGNOSIS — N18.31 STAGE 3A CHRONIC KIDNEY DISEASE (H): Primary | ICD-10-CM

## 2024-07-01 DIAGNOSIS — E11.22 TYPE 2 DIABETES MELLITUS WITH CHRONIC KIDNEY DISEASE, WITHOUT LONG-TERM CURRENT USE OF INSULIN, UNSPECIFIED CKD STAGE (H): ICD-10-CM

## 2024-07-01 DIAGNOSIS — D63.8 ANEMIA IN OTHER CHRONIC DISEASES CLASSIFIED ELSEWHERE: ICD-10-CM

## 2024-07-02 ENCOUNTER — TELEPHONE (OUTPATIENT)
Dept: FAMILY MEDICINE | Facility: CLINIC | Age: 69
End: 2024-07-02

## 2024-07-02 ENCOUNTER — OFFICE VISIT (OUTPATIENT)
Dept: FAMILY MEDICINE | Facility: CLINIC | Age: 69
End: 2024-07-02
Payer: COMMERCIAL

## 2024-07-02 VITALS
OXYGEN SATURATION: 98 % | DIASTOLIC BLOOD PRESSURE: 70 MMHG | TEMPERATURE: 97.8 F | HEART RATE: 52 BPM | HEIGHT: 69 IN | SYSTOLIC BLOOD PRESSURE: 126 MMHG | WEIGHT: 196.8 LBS | BODY MASS INDEX: 29.15 KG/M2 | RESPIRATION RATE: 14 BRPM

## 2024-07-02 DIAGNOSIS — M79.601 PAIN OF RIGHT UPPER EXTREMITY: ICD-10-CM

## 2024-07-02 DIAGNOSIS — E11.22 TYPE 2 DIABETES MELLITUS WITH CHRONIC KIDNEY DISEASE, WITHOUT LONG-TERM CURRENT USE OF INSULIN, UNSPECIFIED CKD STAGE (H): ICD-10-CM

## 2024-07-02 DIAGNOSIS — D63.8 ANEMIA IN OTHER CHRONIC DISEASES CLASSIFIED ELSEWHERE: Primary | ICD-10-CM

## 2024-07-02 DIAGNOSIS — N18.31 STAGE 3A CHRONIC KIDNEY DISEASE (H): ICD-10-CM

## 2024-07-02 DIAGNOSIS — F33.40 SEASONAL AFFECTIVE DISORDER IN REMISSION (H): ICD-10-CM

## 2024-07-02 DIAGNOSIS — F41.0 PANIC DISORDER WITHOUT AGORAPHOBIA: ICD-10-CM

## 2024-07-02 DIAGNOSIS — N40.0 BENIGN PROSTATIC HYPERPLASIA, UNSPECIFIED WHETHER LOWER URINARY TRACT SYMPTOMS PRESENT: ICD-10-CM

## 2024-07-02 DIAGNOSIS — M21.612 BUNION, LEFT: ICD-10-CM

## 2024-07-02 DIAGNOSIS — M21.611 BUNION, RIGHT: ICD-10-CM

## 2024-07-02 LAB
BASOPHILS # BLD AUTO: 0.1 10E3/UL (ref 0–0.2)
BASOPHILS NFR BLD AUTO: 1 %
CHOLEST SERPL-MCNC: 121 MG/DL
CREAT UR-MCNC: 86.6 MG/DL
EOSINOPHIL # BLD AUTO: 0.1 10E3/UL (ref 0–0.7)
EOSINOPHIL NFR BLD AUTO: 2 %
ERYTHROCYTE [DISTWIDTH] IN BLOOD BY AUTOMATED COUNT: 14.5 % (ref 10–15)
HBA1C MFR BLD: 5.7 % (ref 0–5.6)
HCT VFR BLD AUTO: 36 % (ref 40–53)
HDLC SERPL-MCNC: 63 MG/DL
HGB BLD-MCNC: 12.1 G/DL (ref 13.3–17.7)
HOLD SPECIMEN: NORMAL
IMM GRANULOCYTES # BLD: 0 10E3/UL
IMM GRANULOCYTES NFR BLD: 0 %
LDLC SERPL CALC-MCNC: 50 MG/DL
LYMPHOCYTES # BLD AUTO: 1.6 10E3/UL (ref 0.8–5.3)
LYMPHOCYTES NFR BLD AUTO: 29 %
MCH RBC QN AUTO: 27.9 PG (ref 26.5–33)
MCHC RBC AUTO-ENTMCNC: 33.6 G/DL (ref 31.5–36.5)
MCV RBC AUTO: 83 FL (ref 78–100)
MICROALBUMIN UR-MCNC: 31.8 MG/L
MICROALBUMIN/CREAT UR: 36.72 MG/G CR (ref 0–17)
MONOCYTES # BLD AUTO: 0.5 10E3/UL (ref 0–1.3)
MONOCYTES NFR BLD AUTO: 9 %
NEUTROPHILS # BLD AUTO: 3.4 10E3/UL (ref 1.6–8.3)
NEUTROPHILS NFR BLD AUTO: 59 %
NONHDLC SERPL-MCNC: 58 MG/DL
PLATELET # BLD AUTO: 214 10E3/UL (ref 150–450)
RBC # BLD AUTO: 4.33 10E6/UL (ref 4.4–5.9)
TRIGL SERPL-MCNC: 42 MG/DL
WBC # BLD AUTO: 5.7 10E3/UL (ref 4–11)

## 2024-07-02 PROCEDURE — 82570 ASSAY OF URINE CREATININE: CPT | Performed by: FAMILY MEDICINE

## 2024-07-02 PROCEDURE — 80061 LIPID PANEL: CPT | Performed by: FAMILY MEDICINE

## 2024-07-02 PROCEDURE — 99214 OFFICE O/P EST MOD 30 MIN: CPT | Performed by: FAMILY MEDICINE

## 2024-07-02 PROCEDURE — 82043 UR ALBUMIN QUANTITATIVE: CPT | Performed by: FAMILY MEDICINE

## 2024-07-02 PROCEDURE — 36415 COLL VENOUS BLD VENIPUNCTURE: CPT | Performed by: FAMILY MEDICINE

## 2024-07-02 PROCEDURE — 85025 COMPLETE CBC W/AUTO DIFF WBC: CPT | Performed by: FAMILY MEDICINE

## 2024-07-02 PROCEDURE — 83036 HEMOGLOBIN GLYCOSYLATED A1C: CPT | Performed by: FAMILY MEDICINE

## 2024-07-02 RX ORDER — ESCITALOPRAM OXALATE 5 MG/1
5 TABLET ORAL DAILY
Qty: 90 TABLET | Refills: 4 | Status: SHIPPED | OUTPATIENT
Start: 2024-07-02

## 2024-07-02 NOTE — TELEPHONE ENCOUNTER
Patient Quality Outreach    Patient is due for the following:   Physical Annual Wellness Visit    Next Steps:   Schedule a Annual Wellness Visit    Type of outreach:    Phone, spoke to patient/parent. Scheduled    Next Steps:  Reach out within 90 days via Phone and MyChart.    Max number of attempts reached: No. Will try again in 90 days if patient still on fail list.    Questions for provider review:    None           Stanislav Georges  Chart routed to Care Team.
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
## 2024-07-03 ENCOUNTER — MYC MEDICAL ADVICE (OUTPATIENT)
Dept: FAMILY MEDICINE | Facility: CLINIC | Age: 69
End: 2024-07-03
Payer: COMMERCIAL

## 2024-07-03 ENCOUNTER — TELEPHONE (OUTPATIENT)
Dept: FAMILY MEDICINE | Facility: CLINIC | Age: 69
End: 2024-07-03
Payer: COMMERCIAL

## 2024-07-03 DIAGNOSIS — R53.83 OTHER FATIGUE: ICD-10-CM

## 2024-07-03 DIAGNOSIS — N18.31 STAGE 3A CHRONIC KIDNEY DISEASE (H): ICD-10-CM

## 2024-07-03 DIAGNOSIS — D63.8 ANEMIA IN OTHER CHRONIC DISEASES CLASSIFIED ELSEWHERE: Primary | ICD-10-CM

## 2024-07-03 NOTE — TELEPHONE ENCOUNTER
Please fax records and labs to [facility] as this patient sees Dr. Cortes later this month.  Please send my last note plus the last 2 urinalysis, the last 2 microalbumin tests, the last 2 CBCs, the last 2 BMPs, the last 2 PSAs.    The FAX number is : [phone]     thanks

## 2024-07-12 DIAGNOSIS — Z76.0 ENCOUNTER FOR MEDICATION REFILL: ICD-10-CM

## 2024-07-12 RX ORDER — LANCETS
EACH MISCELLANEOUS
Qty: 100 EACH | Refills: 11 | Status: SHIPPED | OUTPATIENT
Start: 2024-07-12

## 2024-07-14 DIAGNOSIS — Z76.0 ENCOUNTER FOR MEDICATION REFILL: ICD-10-CM

## 2024-08-02 ENCOUNTER — NURSE TRIAGE (OUTPATIENT)
Dept: NURSING | Facility: CLINIC | Age: 69
End: 2024-08-02

## 2024-08-02 NOTE — TELEPHONE ENCOUNTER
Patient calling reports testing positive for Covid, wanting to know about Paxlovid for treatment. Reviewed process with triaging symptoms and the process with the clinic addressing in 1 business day with patient declining this. Requesting virtual visit this weekend. Declined triaging the call.     Domigna Little RN 08/02/24 5:53 PM   [facility] Triage Nurse Advisor    Reason for Disposition   Nursing judgment or information in reference    Additional Information   Negative: Nursing judgment, per information in Reference   Negative: Information only call about a Well Adult (no illness or injury)   Negative: Caller can't be reached by phone   Negative: Nursing judgment or information in reference   Negative: Nursing judgment or information in reference   Negative: Nursing judgment or information in reference   Negative: Nursing judgment or information in reference   Negative: Nursing judgment or information in reference   Negative: Nursing judgment or information in reference    Protocols used: No Guideline [id number]

## 2024-08-31 DIAGNOSIS — Z76.0 ENCOUNTER FOR MEDICATION REFILL: ICD-10-CM

## 2024-08-31 DIAGNOSIS — I10 BENIGN ESSENTIAL HTN: ICD-10-CM

## 2024-09-03 RX ORDER — LISINOPRIL 10 MG/1
10 TABLET ORAL DAILY
Qty: 90 TABLET | Refills: 0 | Status: SHIPPED | OUTPATIENT
Start: 2024-09-03

## 2024-09-17 DIAGNOSIS — Z76.0 ENCOUNTER FOR MEDICATION REFILL: ICD-10-CM

## 2024-09-18 RX ORDER — ATORVASTATIN CALCIUM 10 MG/1
TABLET, FILM COATED ORAL
Qty: 90 TABLET | Refills: 0 | Status: SHIPPED | OUTPATIENT
Start: 2024-09-18

## 2024-09-20 DIAGNOSIS — Z76.0 ENCOUNTER FOR MEDICATION REFILL: ICD-10-CM

## 2024-09-20 DIAGNOSIS — I10 BENIGN ESSENTIAL HTN: ICD-10-CM

## 2024-09-20 RX ORDER — LISINOPRIL 10 MG/1
10 TABLET ORAL DAILY
Qty: 90 TABLET | Refills: 0 | OUTPATIENT
Start: 2024-09-20

## 2024-10-09 DIAGNOSIS — Z12.2 SCREENING FOR LUNG CANCER: ICD-10-CM

## 2024-10-09 DIAGNOSIS — Z87.891 FORMER SMOKER: Primary | ICD-10-CM

## 2024-10-14 ENCOUNTER — ONCOLOGY VISIT (OUTPATIENT)
Dept: ONCOLOGY | Facility: HOSPITAL | Age: 69
End: 2024-10-14
Attending: NURSE PRACTITIONER
Payer: COMMERCIAL

## 2024-10-14 ENCOUNTER — LAB (OUTPATIENT)
Dept: INFUSION THERAPY | Facility: HOSPITAL | Age: 69
End: 2024-10-14
Attending: NURSE PRACTITIONER
Payer: COMMERCIAL

## 2024-10-14 ENCOUNTER — PATIENT OUTREACH (OUTPATIENT)
Dept: ONCOLOGY | Facility: HOSPITAL | Age: 69
End: 2024-10-14

## 2024-10-14 VITALS
HEART RATE: 48 BPM | BODY MASS INDEX: 29.89 KG/M2 | WEIGHT: 201.8 LBS | OXYGEN SATURATION: 99 % | DIASTOLIC BLOOD PRESSURE: 67 MMHG | SYSTOLIC BLOOD PRESSURE: 145 MMHG | RESPIRATION RATE: 16 BRPM | TEMPERATURE: 97.7 F | HEIGHT: 69 IN

## 2024-10-14 DIAGNOSIS — N18.31 STAGE 3A CHRONIC KIDNEY DISEASE (H): Primary | ICD-10-CM

## 2024-10-14 DIAGNOSIS — D64.9 NORMOCYTIC ANEMIA: ICD-10-CM

## 2024-10-14 LAB
ANION GAP SERPL CALCULATED.3IONS-SCNC: 8 MMOL/L (ref 7–15)
BASOPHILS # BLD AUTO: 0.1 10E3/UL (ref 0–0.2)
BASOPHILS NFR BLD AUTO: 1 %
BUN SERPL-MCNC: 32.9 MG/DL (ref 8–23)
CALCIUM SERPL-MCNC: 8.9 MG/DL (ref 8.8–10.4)
CHLORIDE SERPL-SCNC: 103 MMOL/L (ref 98–107)
CREAT SERPL-MCNC: 1.21 MG/DL (ref 0.67–1.17)
CRP SERPL-MCNC: <3 MG/L
EGFRCR SERPLBLD CKD-EPI 2021: 64 ML/MIN/1.73M2
EOSINOPHIL # BLD AUTO: 0.1 10E3/UL (ref 0–0.7)
EOSINOPHIL NFR BLD AUTO: 1 %
ERYTHROCYTE [DISTWIDTH] IN BLOOD BY AUTOMATED COUNT: 15 % (ref 10–15)
GLUCOSE SERPL-MCNC: 102 MG/DL (ref 70–99)
HCO3 SERPL-SCNC: 27 MMOL/L (ref 22–29)
HCT VFR BLD AUTO: 36.4 % (ref 40–53)
HGB BLD-MCNC: 11.8 G/DL (ref 13.3–17.7)
IMM GRANULOCYTES # BLD: 0 10E3/UL
IMM GRANULOCYTES NFR BLD: 0 %
LYMPHOCYTES # BLD AUTO: 1.2 10E3/UL (ref 0.8–5.3)
LYMPHOCYTES NFR BLD AUTO: 26 %
MCH RBC QN AUTO: 27.4 PG (ref 26.5–33)
MCHC RBC AUTO-ENTMCNC: 32.4 G/DL (ref 31.5–36.5)
MCV RBC AUTO: 85 FL (ref 78–100)
MONOCYTES # BLD AUTO: 0.5 10E3/UL (ref 0–1.3)
MONOCYTES NFR BLD AUTO: 11 %
NEUTROPHILS # BLD AUTO: 2.9 10E3/UL (ref 1.6–8.3)
NEUTROPHILS NFR BLD AUTO: 61 %
NRBC # BLD AUTO: 0 10E3/UL
NRBC BLD AUTO-RTO: 0 /100
PLATELET # BLD AUTO: 197 10E3/UL (ref 150–450)
POTASSIUM SERPL-SCNC: 4.8 MMOL/L (ref 3.4–5.3)
RBC # BLD AUTO: 4.3 10E6/UL (ref 4.4–5.9)
SODIUM SERPL-SCNC: 138 MMOL/L (ref 135–145)
WBC # BLD AUTO: 4.7 10E3/UL (ref 4–11)

## 2024-10-14 PROCEDURE — 99213 OFFICE O/P EST LOW 20 MIN: CPT | Performed by: NURSE PRACTITIONER

## 2024-10-14 PROCEDURE — 36415 COLL VENOUS BLD VENIPUNCTURE: CPT

## 2024-10-14 PROCEDURE — 86140 C-REACTIVE PROTEIN: CPT

## 2024-10-14 PROCEDURE — G0463 HOSPITAL OUTPT CLINIC VISIT: HCPCS | Performed by: NURSE PRACTITIONER

## 2024-10-14 PROCEDURE — 85025 COMPLETE CBC W/AUTO DIFF WBC: CPT

## 2024-10-14 PROCEDURE — 80048 BASIC METABOLIC PNL TOTAL CA: CPT

## 2024-10-14 ASSESSMENT — PAIN SCALES - GENERAL: PAINLEVEL: NO PAIN (0)

## 2024-10-14 NOTE — PROGRESS NOTES
"Oncology Rooming Note    October 14, 2024 8:52 AM   Juan Fleming is a 69 year old male who presents for:    Chief Complaint   Patient presents with    Oncology Clinic Visit     6 month return visit with labs related to Normocytic anemia.     Initial Vitals: BP (!) 146/67 (BP Location: Left arm, Patient Position: Sitting, Cuff Size: Adult Regular)   Pulse (!) 48   Temp 97.7  F (36.5  C) (Tympanic)   Resp 16   Ht 1.765 m (5' 9.49\")   Wt 91.5 kg (201 lb 12.8 oz)   SpO2 99%   BMI 29.38 kg/m   Estimated body mass index is 29.38 kg/m  as calculated from the following:    Height as of this encounter: 1.765 m (5' 9.49\").    Weight as of this encounter: 91.5 kg (201 lb 12.8 oz). Body surface area is 2.12 meters squared.  No Pain (0) Comment: Data Unavailable   No LMP for male patient.  Allergies reviewed: Yes  Medications reviewed: Yes    Medications: Medication refills not needed today.  Pharmacy name entered into [employer]: [id number] - [place], [place] - [address]    Frailty Screening:   Is the patient here for a new oncology consult visit in cancer care? 2. No      Clinical concerns: Review labs. Juan is hoping is hemoglobin levels are still improving.   Tana Slater was notified.      Sophy Powers [facility]              "

## 2024-10-14 NOTE — LETTER
10/14/2024      Juan Fleming  [address] [place] [address]      Dear Colleague,    Thank you for referring your patient, Juan Fleming, to the [facility] [place]. Please see a copy of my visit note below.    [facility] Hematology and Oncology Progress Note    Patient: Juan Fleming  MRN: [id number]  Date of Service: Oct 14, 2024          Reason for Visit    Chief Complaint   Patient presents with     Oncology Clinic Visit     6 month return visit with labs related to Normocytic anemia.       Assessment and Plan     Cancer Staging   No matching staging information was found for the patient.    1.  Normocytic anemia: This has slowly gotten worse over the last 5 years.  It is mild.  His white blood cell count and platelet count are normal.  He was evaluated for nutritional deficiencies, bleeding and other allergies and none were found.  He could have some very mild chronic disease with mild kidney dysfunction.  At this point he still has a very mild anemia.  Definitely not getting worse and fluctuating.   No need for any further intervention.  We talked briefly about the fact that we do not get too concerned until this consistently drops further and starts getting closer to 10.  I did talk to him about a potential bone marrow biopsy if things get worse.  Pt would prefer to get labs at PCP and not come to this clinic, which is reasonable. Encourage him to get CBC roughly every 6 months.  Hemoglobin is consistently getting worse we can see him back in clinic to talk about bone marrow biopsy.    ECOG Performance    0 - Independent    Distress Screening (within last 30 days)    1. How concerned are you about your ability to eat? : 0  2. How concerned are you about unintended weight loss or your current weight? : 0  3. How concerned are you about feeling depressed or very sad? : 0  4. How concerned are you about feeling anxious or very scared? : 0  5. Do you struggle with the loss of  meaning and stephane in your life? : Not at all  6. How concerned are you about work and home life issues that may be affected by your cancer? : 0  7. How concerned are you about knowing what resources are available to help you? : 0  8. Do you currently have what you would describe as Jew or spiritual struggles?: Not at all       Pain  Pain Score: No Pain (0)    Problem List    Patient Active Problem List   Diagnosis     Benign prostatic hyperplasia     History of pulmonary embolism     Acrochordon     Anxiety     Type 2 diabetes mellitus with chronic kidney disease, without long-term current use of insulin, unspecified CKD stage (H)     SINDI (acute kidney injury) (H)     Hyponatremia     Dyslipidemia     Benign essential HTN     Bunion, left     Heart murmur     Onychomycosis     H/O small bowel obstruction     History of incisional hernia repair     Stage 3a chronic kidney disease (H)     Seasonal affective disorder in remission (H)     Pain of right upper extremity        ______________________________________________________________________________    History of Present Illness    Hematologist: Dr. Higgins    Diagnosis: Anemia, normocytic.  Mild anemia has been going on really since 2019.  Slowly decreasing.  No evidence of bleeding.  Morphology okay and showed some mild ineffective erythropoiesis.  Could be from the globin synthesis or anemia of chronic disease.  Testosterone okay.  Nutritional deficiencies were negative.  Does have some mild intermittent kidney dysfunction with creatinine, up to 1.31 about a year ago.  Intermittently abnormal     Treatment:  Observation    Interim history:  Patient is here today for a follow up visit and to review labs.  Overall patient states that he is doing well.  He has some questions about if he needs to keep coming to this clinic.  He states that he did have his labs drawn at his primary care doctor's office this summer.  He denies any new bone or back pain issues.   "Denies any significant fatigue.  He says he does have some arthritis but pretty typical for his age and nothing that is really severe.  He does try to avoid NSAIDs.  States he did take a lot of them when he had a back injury a while ago.  He does have mild chronic kidney disease.      Review of Systems    Pertinent items are noted in HPI.    Past History    Past Medical History:   Diagnosis Date     Alcohol abuse, in remission      Anemia      Anxiety      BPH (benign prostatic hyperplasia)      Colorectal polyps 02/01/2020    none high grade; repeat colonoscopy in 3 yrs - next 2/2026     Diabetes mellitus (H)      Hernia 09/07/2017    Ventral     History of pulmonary embolism      Hx SBO      Hypertension      Influenza      PE (pulmonary embolism)      Perforated bowel (H)      Ventral hernia without obstruction or gangrene        PHYSICAL EXAM  BP (!) 145/67 (BP Location: Left arm, Patient Position: Sitting, Cuff Size: Adult Regular)   Pulse (!) 48   Temp 97.7  F (36.5  C) (Tympanic)   Resp 16   Ht 1.765 m (5' 9.49\")   Wt 91.5 kg (201 lb 12.8 oz)   SpO2 99%   BMI 29.38 kg/m      GENERAL: no acute distress. Cooperative in conversation. Alone in clinic  RESP: Regular respiratory rate. No expiratory wheezes   NEURO: non focal. Alert and oriented x3.   PSYCH: within normal limits. No depression or anxiety.  SKIN: exposed skin is dry intact.     Lab Results    Recent Results (from the past 168 hour(s))   Basic metabolic panel   Result Value Ref Range    Sodium 138 135 - 145 mmol/L    Potassium 4.8 3.4 - 5.3 mmol/L    Chloride 103 98 - 107 mmol/L    Carbon Dioxide (CO2) 27 22 - 29 mmol/L    Anion Gap 8 7 - 15 mmol/L    Urea Nitrogen 32.9 (H) 8.0 - 23.0 mg/dL    Creatinine 1.21 (H) 0.67 - 1.17 mg/dL    GFR Estimate 64 >60 mL/min/1.73m2    Calcium 8.9 8.8 - 10.4 mg/dL    Glucose 102 (H) 70 - 99 mg/dL   CRP, inflammation   Result Value Ref Range    CRP Inflammation <3.00 <5.00 mg/L   CBC with platelets and " "differential   Result Value Ref Range    WBC Count 4.7 4.0 - 11.0 10e3/uL    RBC Count 4.30 (L) 4.40 - 5.90 10e6/uL    Hemoglobin 11.8 (L) 13.3 - 17.7 g/dL    Hematocrit 36.4 (L) 40.0 - 53.0 %    MCV 85 78 - 100 fL    MCH 27.4 26.5 - 33.0 pg    MCHC 32.4 31.5 - 36.5 g/dL    RDW 15.0 10.0 - 15.0 %    Platelet Count 197 150 - 450 10e3/uL    % Neutrophils 61 %    % Lymphocytes 26 %    % Monocytes 11 %    % Eosinophils 1 %    % Basophils 1 %    % Immature Granulocytes 0 %    NRBCs per 100 WBC 0 <1 /100    Absolute Neutrophils 2.9 1.6 - 8.3 10e3/uL    Absolute Lymphocytes 1.2 0.8 - 5.3 10e3/uL    Absolute Monocytes 0.5 0.0 - 1.3 10e3/uL    Absolute Eosinophils 0.1 0.0 - 0.7 10e3/uL    Absolute Basophils 0.1 0.0 - 0.2 10e3/uL    Absolute Immature Granulocytes 0.0 <=0.4 10e3/uL    Absolute NRBCs 0.0 10e3/uL         Imaging    No results found.        Signed by: PATY Gomes CNP    Oncology Rooming Note    October 14, 2024 8:52 AM   Juan Fleming is a 69 year old male who presents for:    Chief Complaint   Patient presents with     Oncology Clinic Visit     6 month return visit with labs related to Normocytic anemia.     Initial Vitals: BP (!) 146/67 (BP Location: Left arm, Patient Position: Sitting, Cuff Size: Adult Regular)   Pulse (!) 48   Temp 97.7  F (36.5  C) (Tympanic)   Resp 16   Ht 1.765 m (5' 9.49\")   Wt 91.5 kg (201 lb 12.8 oz)   SpO2 99%   BMI 29.38 kg/m   Estimated body mass index is 29.38 kg/m  as calculated from the following:    Height as of this encounter: 1.765 m (5' 9.49\").    Weight as of this encounter: 91.5 kg (201 lb 12.8 oz). Body surface area is 2.12 meters squared.  No Pain (0) Comment: Data Unavailable   No LMP for male patient.  Allergies reviewed: Yes  Medications reviewed: Yes    Medications: Medication refills not needed today.  Pharmacy name entered into [employer]: [id number] - [place], [place] - [address]    Frailty Screening:   Is the patient here for a new " oncology consult visit in cancer care? 2. No      Clinical concerns: Review labs. Juan is hoping is hemoglobin levels are still improving.   Tana Slater was notified.      Sophy Powers CMA                Again, thank you for allowing me to participate in the care of your patient.        Sincerely,        PATY Gomes CNP

## 2024-10-14 NOTE — PROGRESS NOTES
[facility] Hematology and Oncology Progress Note    Patient: Juan Fleming  MRN: [id number]  Date of Service: Oct 14, 2024          Reason for Visit    Chief Complaint   Patient presents with    Oncology Clinic Visit     6 month return visit with labs related to Normocytic anemia.       Assessment and Plan     Cancer Staging   No matching staging information was found for the patient.    1.  Normocytic anemia: This has slowly gotten worse over the last 5 years.  It is mild.  His white blood cell count and platelet count are normal.  He was evaluated for nutritional deficiencies, bleeding and other allergies and none were found.  He could have some very mild chronic disease with mild kidney dysfunction.  At this point he still has a very mild anemia.  Definitely not getting worse and fluctuating.   No need for any further intervention.  We talked briefly about the fact that we do not get too concerned until this consistently drops further and starts getting closer to 10.  I did talk to him about a potential bone marrow biopsy if things get worse.  Pt would prefer to get labs at PCP and not come to this clinic, which is reasonable. Encourage him to get CBC roughly every 6 months.  Hemoglobin is consistently getting worse we can see him back in clinic to talk about bone marrow biopsy.    ECOG Performance    0 - Independent    Distress Screening (within last 30 days)    1. How concerned are you about your ability to eat? : 0  2. How concerned are you about unintended weight loss or your current weight? : 0  3. How concerned are you about feeling depressed or very sad? : 0  4. How concerned are you about feeling anxious or very scared? : 0  5. Do you struggle with the loss of meaning and stephane in your life? : Not at all  6. How concerned are you about work and home life issues that may be affected by your cancer? : 0  7. How concerned are you about knowing what resources are available to help you? : 0  8. Do you  currently have what you would describe as Religion or spiritual struggles?: Not at all       Pain  Pain Score: No Pain (0)    Problem List    Patient Active Problem List   Diagnosis    Benign prostatic hyperplasia    History of pulmonary embolism    Acrochordon    Anxiety    Type 2 diabetes mellitus with chronic kidney disease, without long-term current use of insulin, unspecified CKD stage (H)    SINDI (acute kidney injury) (H)    Hyponatremia    Dyslipidemia    Benign essential HTN    Bunion, left    Heart murmur    Onychomycosis    H/O small bowel obstruction    History of incisional hernia repair    Stage 3a chronic kidney disease (H)    Seasonal affective disorder in remission (H)    Pain of right upper extremity        ______________________________________________________________________________    History of Present Illness    Hematologist: Dr. Higgins    Diagnosis: Anemia, normocytic.  Mild anemia has been going on really since 2019.  Slowly decreasing.  No evidence of bleeding.  Morphology okay and showed some mild ineffective erythropoiesis.  Could be from the globin synthesis or anemia of chronic disease.  Testosterone okay.  Nutritional deficiencies were negative.  Does have some mild intermittent kidney dysfunction with creatinine, up to 1.31 about a year ago.  Intermittently abnormal     Treatment:  Observation    Interim history:  Patient is here today for a follow up visit and to review labs.  Overall patient states that he is doing well.  He has some questions about if he needs to keep coming to this clinic.  He states that he did have his labs drawn at his primary care doctor's office this summer.  He denies any new bone or back pain issues.  Denies any significant fatigue.  He says he does have some arthritis but pretty typical for his age and nothing that is really severe.  He does try to avoid NSAIDs.  States he did take a lot of them when he had a back injury a while ago.  He does have mild  "chronic kidney disease.      Review of Systems    Pertinent items are noted in HPI.    Past History    Past Medical History:   Diagnosis Date    Alcohol abuse, in remission     Anemia     Anxiety     BPH (benign prostatic hyperplasia)     Colorectal polyps 02/01/2020    none high grade; repeat colonoscopy in 3 yrs - next 2/2026    Diabetes mellitus (H)     Hernia 09/07/2017    Ventral    History of pulmonary embolism     Hx SBO     Hypertension     Influenza     PE (pulmonary embolism)     Perforated bowel (H)     Ventral hernia without obstruction or gangrene        PHYSICAL EXAM  BP (!) 145/67 (BP Location: Left arm, Patient Position: Sitting, Cuff Size: Adult Regular)   Pulse (!) 48   Temp 97.7  F (36.5  C) (Tympanic)   Resp 16   Ht 1.765 m (5' 9.49\")   Wt 91.5 kg (201 lb 12.8 oz)   SpO2 99%   BMI 29.38 kg/m      GENERAL: no acute distress. Cooperative in conversation. Alone in clinic  RESP: Regular respiratory rate. No expiratory wheezes   NEURO: non focal. Alert and oriented x3.   PSYCH: within normal limits. No depression or anxiety.  SKIN: exposed skin is dry intact.     Lab Results    Recent Results (from the past 168 hour(s))   Basic metabolic panel   Result Value Ref Range    Sodium 138 135 - 145 mmol/L    Potassium 4.8 3.4 - 5.3 mmol/L    Chloride 103 98 - 107 mmol/L    Carbon Dioxide (CO2) 27 22 - 29 mmol/L    Anion Gap 8 7 - 15 mmol/L    Urea Nitrogen 32.9 (H) 8.0 - 23.0 mg/dL    Creatinine 1.21 (H) 0.67 - 1.17 mg/dL    GFR Estimate 64 >60 mL/min/1.73m2    Calcium 8.9 8.8 - 10.4 mg/dL    Glucose 102 (H) 70 - 99 mg/dL   CRP, inflammation   Result Value Ref Range    CRP Inflammation <3.00 <5.00 mg/L   CBC with platelets and differential   Result Value Ref Range    WBC Count 4.7 4.0 - 11.0 10e3/uL    RBC Count 4.30 (L) 4.40 - 5.90 10e6/uL    Hemoglobin 11.8 (L) 13.3 - 17.7 g/dL    Hematocrit 36.4 (L) 40.0 - 53.0 %    MCV 85 78 - 100 fL    MCH 27.4 26.5 - 33.0 pg    MCHC 32.4 31.5 - 36.5 g/dL    RDW " 15.0 10.0 - 15.0 %    Platelet Count 197 150 - 450 10e3/uL    % Neutrophils 61 %    % Lymphocytes 26 %    % Monocytes 11 %    % Eosinophils 1 %    % Basophils 1 %    % Immature Granulocytes 0 %    NRBCs per 100 WBC 0 <1 /100    Absolute Neutrophils 2.9 1.6 - 8.3 10e3/uL    Absolute Lymphocytes 1.2 0.8 - 5.3 10e3/uL    Absolute Monocytes 0.5 0.0 - 1.3 10e3/uL    Absolute Eosinophils 0.1 0.0 - 0.7 10e3/uL    Absolute Basophils 0.1 0.0 - 0.2 10e3/uL    Absolute Immature Granulocytes 0.0 <=0.4 10e3/uL    Absolute NRBCs 0.0 10e3/uL         Imaging    No results found.        Signed by: PATY Gomes CNP

## 2024-11-08 ENCOUNTER — TRANSFERRED RECORDS (OUTPATIENT)
Dept: HEALTH INFORMATION MANAGEMENT | Facility: CLINIC | Age: 69
End: 2024-11-08
Payer: COMMERCIAL

## 2024-11-08 LAB — RETINOPATHY: NEGATIVE

## 2024-11-29 ENCOUNTER — HOSPITAL ENCOUNTER (OUTPATIENT)
Dept: CT IMAGING | Facility: CLINIC | Age: 69
Discharge: HOME | End: 2024-11-29
Attending: FAMILY MEDICINE | Admitting: FAMILY MEDICINE
Payer: COMMERCIAL

## 2024-11-29 DIAGNOSIS — Z87.891 FORMER SMOKER: ICD-10-CM

## 2024-11-29 DIAGNOSIS — Z12.2 SCREENING FOR LUNG CANCER: ICD-10-CM

## 2024-11-29 PROCEDURE — 71271 CT THORAX LUNG CANCER SCR C-: CPT

## 2024-12-15 DIAGNOSIS — Z76.0 ENCOUNTER FOR MEDICATION REFILL: ICD-10-CM

## 2024-12-15 DIAGNOSIS — I10 BENIGN ESSENTIAL HTN: ICD-10-CM

## 2024-12-15 RX ORDER — LISINOPRIL 10 MG/1
10 TABLET ORAL DAILY
Qty: 30 TABLET | Refills: 0 | Status: SHIPPED | OUTPATIENT
Start: 2024-12-15

## 2024-12-15 RX ORDER — ATORVASTATIN CALCIUM 10 MG/1
TABLET, FILM COATED ORAL
Qty: 90 TABLET | Refills: 1 | Status: SHIPPED | OUTPATIENT
Start: 2024-12-15

## 2025-01-02 DIAGNOSIS — N18.31 STAGE 3A CHRONIC KIDNEY DISEASE (H): ICD-10-CM

## 2025-01-02 DIAGNOSIS — D63.8 ANEMIA IN OTHER CHRONIC DISEASES CLASSIFIED ELSEWHERE: ICD-10-CM

## 2025-01-02 DIAGNOSIS — E11.22 TYPE 2 DIABETES MELLITUS WITH CHRONIC KIDNEY DISEASE, WITHOUT LONG-TERM CURRENT USE OF INSULIN, UNSPECIFIED CKD STAGE (H): ICD-10-CM

## 2025-01-02 DIAGNOSIS — I10 BENIGN ESSENTIAL HTN: Primary | ICD-10-CM

## 2025-02-01 ENCOUNTER — HEALTH MAINTENANCE LETTER (OUTPATIENT)
Age: 70
End: 2025-02-01

## 2025-04-10 ENCOUNTER — PATIENT OUTREACH (OUTPATIENT)
Dept: CARE COORDINATION | Facility: CLINIC | Age: 70
End: 2025-04-10
Payer: COMMERCIAL

## 2025-06-12 DIAGNOSIS — Z76.0 ENCOUNTER FOR MEDICATION REFILL: ICD-10-CM

## 2025-06-12 RX ORDER — ATORVASTATIN CALCIUM 10 MG/1
10 TABLET, FILM COATED ORAL DAILY
Qty: 90 TABLET | Refills: 0 | Status: SHIPPED | OUTPATIENT
Start: 2025-06-12

## 2025-07-06 ENCOUNTER — HEALTH MAINTENANCE LETTER (OUTPATIENT)
Age: 70
End: 2025-07-06

## 2025-07-09 ENCOUNTER — TELEPHONE (OUTPATIENT)
Dept: FAMILY MEDICINE | Facility: CLINIC | Age: 70
End: 2025-07-09
Payer: COMMERCIAL

## 2025-07-09 NOTE — TELEPHONE ENCOUNTER
Patient Quality Outreach    Patient is due for the following:   Physical Annual Wellness Visit    Action(s) Taken:   Chart routed to abstraction.      Type of outreach:    Chart review performed, no outreach needed.  Please abstract the following data from this visit with this patient into the appropriate field in Epic:    Tests that can be patient reported without a hard copy:    Physical in chart 1/2025    Other Tests found in the patient's chart through Chart Review/Care Everywhere:  done by this group [facility] on this date: 1/3/25    Note to Abstraction: If this section is blank, no results were found via Chart Review/Care Everywhere.      Questions for provider review:  Please update Health Maintenance or advise         Angela Benson MA  Chart routed to Abstraction./PC

## 2025-07-10 DIAGNOSIS — E11.22 TYPE 2 DIABETES MELLITUS WITH CHRONIC KIDNEY DISEASE, WITHOUT LONG-TERM CURRENT USE OF INSULIN, UNSPECIFIED CKD STAGE (H): Primary | ICD-10-CM

## 2025-07-10 DIAGNOSIS — D63.8 ANEMIA IN OTHER CHRONIC DISEASES CLASSIFIED ELSEWHERE: ICD-10-CM

## 2025-07-11 ENCOUNTER — OFFICE VISIT (OUTPATIENT)
Dept: FAMILY MEDICINE | Facility: CLINIC | Age: 70
End: 2025-07-11
Payer: COMMERCIAL

## 2025-07-11 VITALS
DIASTOLIC BLOOD PRESSURE: 71 MMHG | OXYGEN SATURATION: 99 % | RESPIRATION RATE: 14 BRPM | HEIGHT: 69 IN | HEART RATE: 52 BPM | TEMPERATURE: 97.7 F | WEIGHT: 198 LBS | BODY MASS INDEX: 29.33 KG/M2 | SYSTOLIC BLOOD PRESSURE: 128 MMHG

## 2025-07-11 DIAGNOSIS — M85.861 OSTEOPENIA OF BOTH LOWER LEGS: ICD-10-CM

## 2025-07-11 DIAGNOSIS — Z00.00 PREVENTATIVE HEALTH CARE: ICD-10-CM

## 2025-07-11 DIAGNOSIS — Z00.00 ENCOUNTER FOR MEDICARE ANNUAL WELLNESS EXAM: Primary | ICD-10-CM

## 2025-07-11 DIAGNOSIS — D63.8 ANEMIA IN OTHER CHRONIC DISEASES CLASSIFIED ELSEWHERE: ICD-10-CM

## 2025-07-11 DIAGNOSIS — M85.862 OSTEOPENIA OF BOTH LOWER LEGS: ICD-10-CM

## 2025-07-11 DIAGNOSIS — N18.31 STAGE 3A CHRONIC KIDNEY DISEASE (H): ICD-10-CM

## 2025-07-11 DIAGNOSIS — E11.22 TYPE 2 DIABETES MELLITUS WITH CHRONIC KIDNEY DISEASE, WITHOUT LONG-TERM CURRENT USE OF INSULIN, UNSPECIFIED CKD STAGE (H): ICD-10-CM

## 2025-07-11 DIAGNOSIS — Z91.89 AT RISK FOR LOSS OF BONE DENSITY: ICD-10-CM

## 2025-07-11 LAB
BASOPHILS # BLD AUTO: 0.1 10E3/UL (ref 0–0.2)
BASOPHILS NFR BLD AUTO: 1 %
EOSINOPHIL # BLD AUTO: 0.1 10E3/UL (ref 0–0.7)
EOSINOPHIL NFR BLD AUTO: 1 %
ERYTHROCYTE [DISTWIDTH] IN BLOOD BY AUTOMATED COUNT: 15.4 % (ref 10–15)
EST. AVERAGE GLUCOSE BLD GHB EST-MCNC: 120 MG/DL
HBA1C MFR BLD: 5.8 % (ref 0–5.6)
HCT VFR BLD AUTO: 35.6 % (ref 40–53)
HGB BLD-MCNC: 12.2 G/DL (ref 13.3–17.7)
IMM GRANULOCYTES # BLD: 0 10E3/UL
IMM GRANULOCYTES NFR BLD: 0 %
LYMPHOCYTES # BLD AUTO: 1.4 10E3/UL (ref 0.8–5.3)
LYMPHOCYTES NFR BLD AUTO: 24 %
MCH RBC QN AUTO: 28.4 PG (ref 26.5–33)
MCHC RBC AUTO-ENTMCNC: 34.3 G/DL (ref 31.5–36.5)
MCV RBC AUTO: 83 FL (ref 78–100)
MONOCYTES # BLD AUTO: 0.6 10E3/UL (ref 0–1.3)
MONOCYTES NFR BLD AUTO: 11 %
NEUTROPHILS # BLD AUTO: 3.6 10E3/UL (ref 1.6–8.3)
NEUTROPHILS NFR BLD AUTO: 62 %
PLATELET # BLD AUTO: 206 10E3/UL (ref 150–450)
RBC # BLD AUTO: 4.3 10E6/UL (ref 4.4–5.9)
WBC # BLD AUTO: 5.7 10E3/UL (ref 4–11)

## 2025-07-11 PROCEDURE — 85025 COMPLETE CBC W/AUTO DIFF WBC: CPT | Performed by: FAMILY MEDICINE

## 2025-07-11 PROCEDURE — G0439 PPPS, SUBSEQ VISIT: HCPCS | Performed by: FAMILY MEDICINE

## 2025-07-11 PROCEDURE — 83036 HEMOGLOBIN GLYCOSYLATED A1C: CPT | Performed by: FAMILY MEDICINE

## 2025-07-11 PROCEDURE — 36415 COLL VENOUS BLD VENIPUNCTURE: CPT | Performed by: FAMILY MEDICINE

## 2025-07-11 PROCEDURE — 3044F HG A1C LEVEL LT 7.0%: CPT | Performed by: FAMILY MEDICINE

## 2025-07-11 PROCEDURE — 3078F DIAST BP <80 MM HG: CPT | Performed by: FAMILY MEDICINE

## 2025-07-11 PROCEDURE — 3074F SYST BP LT 130 MM HG: CPT | Performed by: FAMILY MEDICINE

## 2025-07-11 SDOH — HEALTH STABILITY: PHYSICAL HEALTH: ON AVERAGE, HOW MANY MINUTES DO YOU ENGAGE IN EXERCISE AT THIS LEVEL?: 60 MIN

## 2025-07-11 SDOH — HEALTH STABILITY: PHYSICAL HEALTH: ON AVERAGE, HOW MANY DAYS PER WEEK DO YOU ENGAGE IN MODERATE TO STRENUOUS EXERCISE (LIKE A BRISK WALK)?: 7 DAYS

## 2025-07-11 ASSESSMENT — PATIENT HEALTH QUESTIONNAIRE - PHQ9
SUM OF ALL RESPONSES TO PHQ QUESTIONS 1-9: 0
SUM OF ALL RESPONSES TO PHQ QUESTIONS 1-9: 0
10. IF YOU CHECKED OFF ANY PROBLEMS, HOW DIFFICULT HAVE THESE PROBLEMS MADE IT FOR YOU TO DO YOUR WORK, TAKE CARE OF THINGS AT HOME, OR GET ALONG WITH OTHER PEOPLE: NOT DIFFICULT AT ALL

## 2025-07-11 ASSESSMENT — SOCIAL DETERMINANTS OF HEALTH (SDOH): HOW OFTEN DO YOU GET TOGETHER WITH FRIENDS OR RELATIVES?: PATIENT DECLINED

## 2025-07-11 NOTE — PROGRESS NOTES
"Preventive Care Visit  [facility] SEVEN Ng MD, Family Medicine  Jul 11, 2025      Assessment & Plan     Encounter for Medicare annual wellness exam  completed    Type 2 diabetes mellitus with chronic kidney disease, without long-term current use of insulin, unspecified CKD stage (H)  A1-c 5.8, up from 5.7. this is ok - he admits he's been having more ice cream. Continue being active. No meds - continue same.  - Hemoglobin A1c    Anemia in other chronic diseases classified elsewhere  Hgb 12.2 which is down slightly from 12.6 six months ago. He eats meat about 3 times per week. He agrees to get some ferrous gluconate OTC and take it on days he does not eat meat.  - CBC with platelets and differential    Stage 3a chronic kidney disease (H)  Hoping this is fine - Hgb close to stable.    At risk for loss of bone density  His dad broke his hip so he wants this checked.  - DX Bone Density    Preventative health care  reviewed  - REVIEW OF HEALTH MAINTENANCE PROTOCOL ORDERS    Osteopenia of both lower legs- checking for this.  - DX Bone Density      BMI  Estimated body mass index is 29.26 kg/m  as calculated from the following:    Height as of this encounter: 1.752 m (5' 8.98\").    Weight as of this encounter: 89.8 kg (198 lb).   Weight management plan: Discussed healthy diet and exercise guidelines    Counseling  Appropriate preventive services were addressed with this patient via screening, questionnaire, or discussion as appropriate for fall prevention, nutrition, physical activity, Tobacco-use cessation, social engagement, weight loss and cognition.  Checklist reviewing preventive services available has been given to the patient.  Reviewed patient's diet, addressing concerns and/or questions.   He is at risk for psychosocial distress and has been provided with information to reduce risk.   The patient was provided with written information regarding signs of hearing loss.   Reviewed " preventive health counseling, as reflected in patient instructions       Regular exercise       Healthy diet/nutrition       Vision screening       Hearing screening       Alcohol Use        Family planning       Osteoporosis prevention/bone health       Advance Care Planning    Follow-up    Follow-up Visit   Expected date:  Jul 19, 2026 (Approximate)      Follow Up Appointment Details:     Follow-up with whom?: PCP    Follow-Up for what?: Medicare Wellness    Welcome or Annual?: Annual Wellness    How?: In Person               On the DOS, I spent 20min on AWV and an additional 10min on diabetes, weight and anemia                Subjective   Juan is a 70 year old, presenting for the following:  Annual Visit        7/11/2025     7:20 AM   Additional Questions   Roomed by paw p   Accompanied by self          History of Present Illness       Reason for visit:  Annual     Got a new boat - it's been stressful but it is good to keep learning. It is a great boat but they are figuring it out. He hopes to do the inner Loop.     A1-C 5.8 up from 5.7. has been having more ice cream this summer.    Had a spot of cancer on his nose - derm removed it and placed a skin graft.    Prostate - he has a med - which he is not taking now -   He's appreciating what he has while he has it    Advance Care Planning    Document on file is a Health Care Directive or POLST.        7/11/2025   General Health   How would you rate your overall physical health? Good   Feel stress (tense, anxious, or unable to sleep) To some extent   (!) STRESS CONCERN      7/11/2025   Nutrition   Diet: I don't know         7/11/2025   Exercise   Days per week of moderate/strenous exercise 7 days   Average minutes spent exercising at this level 60 min         7/11/2025   Social Factors   Frequency of gathering with friends or relatives Patient declined   Worry food won't last until get money to buy more No   Food not last or not have enough money for food? No   Do  you have housing? (Housing is defined as stable permanent housing and does not include staying outside in a car, in a tent, in an abandoned building, in an overnight shelter, or couch-surfing.) Yes   Are you worried about losing your housing? No   Lack of transportation? No   Unable to get utilities (heat,electricity)? No         7/11/2025   Fall Risk   Fallen 2 or more times in the past year? No   Trouble with walking or balance? No          7/11/2025   Activities of Daily Living- Home Safety   Needs help with the following daily activites None of the above   Safety concerns in the home None of the above         7/11/2025   Dental   Dentist two times every year? Yes         7/11/2025   Hearing Screening   Hearing concerns? (!) TROUBLE UNDERSTANDING SOFT OR WHISPERED SPEECH.   Would you like a referral for hearing testing? No         7/11/2025   Driving Risk Screening   Patient/family members have concerns about driving No         7/11/2025   General Alertness/Fatigue Screening   Have you been more tired than usual lately? No         7/11/2025   Urinary Incontinence Screening   Bothered by leaking urine in past 6 months No       Today's PHQ-9 Score:       7/11/2025     7:05 AM   PHQ-9 SCORE   PHQ-9 Total Score MyChart 0   PHQ-9 Total Score 0        Patient-reported         7/11/2025   Substance Use   Alcohol more than 3/day or more than 7/wk Not Applicable   Do you have a current opioid prescription? No   How severe/bad is pain from 1 to 10? 3/10   Do you use any other substances recreationally? No     Social History     Tobacco Use    Smoking status: Former     Current packs/day: 0.00     Average packs/day: 1.3 packs/day for 70.6 years (94.2 ttl pk-yrs)     Types: Cigarettes     Start date: 1/1/1968     Quit date: 1/26/2015     Years since quitting: 10.4     Passive exposure: Past    Smokeless tobacco: Never    Tobacco comments:     Childhood exposure to parents smoking in home   Vaping Use    Vaping status: Former     Start date: 2015    Quit date: 2017   Substance Use Topics    Alcohol use: Not Currently     Comment: Alcoholic Drinks/day: quit drinkning     Drug use: Not Currently     Types: Marijuana       ASCVD Risk   The ASCVD Risk score (Mally AUSTIN, et al., 2019) failed to calculate for the following reasons:    The valid total cholesterol range is 130 to 320 mg/dL    Fracture Risk Assessment Tool  Link to Frax Calculator  Use the information below to complete the Frax calculator  : 1955  Sex: male  Weight (kg): 89.8 kg (actual weight)  Height (cm): 175.2 cm  Previous Fragility Fracture:  No  History of parent with fractured hip:  Yes  Current Smoking:  No  Patient has been on glucocorticoids for more than 3 months (5mg/day or more): No  Rheumatoid Arthritis on Problem List:  No  Secondary Osteoporosis on Problem List:  No  Consumes 3 or more units of alcohol per day: Yes, historically was an alcoholic  Femoral Neck BMD (g/cm2)            Reviewed and updated as needed this visit by Provider    Allergies  Meds  Problems               Past Medical History:   Diagnosis Date    Alcohol abuse, in remission     Anemia     Anxiety     BPH (benign prostatic hyperplasia)     Colorectal polyps 2020    none high grade; repeat colonoscopy in 3 yrs - next 2026    Diabetes mellitus (H)     Hernia 2017    Ventral    History of pulmonary embolism     Hx SBO     Hypertension     Influenza     PE (pulmonary embolism)     Perforated bowel (H)     Ventral hernia without obstruction or gangrene      Past Surgical History:   Procedure Laterality Date    ABDOMEN SURGERY      Colen resection    HERNIORRHAPHY VENTRAL N/A 11/3/2017    Procedure: REPAIR, HERNIA, VENTRAL, OPEN;  Surgeon: Maxine Rivera MD;  Location: [facility];  Service:     HERNIORRHAPHY, INCISIONAL, ROBOT-ASSISTED, LAPAROSCOPIC, DA BABATUNDE XI, W/TRANSVERSE ABDOMINIS RELEASE N/A 2023    Procedure: HERNIORRHAPHY,  INCISIONAL, ROBOT-ASSISTED, LAPAROSCOPIC, USING DA BABATUNDE XI with bilateral transversus abdominus release;  Surgeon: Bon Espinoza DO;  Location: [facility] APPENDECTOMY      Description: Appendectomy;  Recorded: 07/30/2008;    [facility] EXPLORATORY OF ABDOMEN N/A 11/3/2017    Procedure: LAPAROTOMY, LYSIS OF ADHESIONS, MESH EXPLANTATION VENTRAL HERNIA REPAIR WITH MESH ;  Surgeon: Maxine Rivera MD;  Location: [facility];  Service: General    RUST REPAIR INCISIONAL HERNIA,REDUCIBLE      Description: Ventral Hernia Repair;  Recorded: 07/30/2008;    [facility],<6Y/O,REDUC      Description: Umbilical Hernia Repair;  Recorded: 07/30/2008;     Labs reviewed in EPIC  BP Readings from Last 3 Encounters:   07/11/25 128/71   01/03/25 122/69   10/14/24 (!) 145/67    Wt Readings from Last 3 Encounters:   07/11/25 89.8 kg (198 lb)   01/03/25 89.1 kg (196 lb 8 oz)   10/14/24 91.5 kg (201 lb 12.8 oz)                  Current Outpatient Medications   Medication Sig Dispense Refill    acetaminophen (TYLENOL) 500 MG tablet Take 1-2 tablets (500-1,000 mg) by mouth every 6 hours as needed for mild pain      atorvastatin (LIPITOR) 10 MG tablet Take 1 tablet (10 mg total) by mouth daily 90 tablet 0    blood glucose (CONTOUR NEXT TEST) test strip Use 1 each to test blood glucose  As Directed Daily at 8:00 am 100 strip 11    cetirizine (ZYRTEC) 10 MG tablet Take 10 mg by mouth as needed      cyanocobalamin (VITAMIN B-12) 1000 MCG tablet Take 1,000 mcg by mouth daily      escitalopram (LEXAPRO) 5 MG tablet Take 1 tablet (5 mg) by mouth daily 90 tablet 4    lisinopril (ZESTRIL) 10 MG tablet Take 1 tablet (10 mg) by mouth daily. 90 tablet 4    Microlet Lancets MISC Use 1 each to test blood glucose As Directed Daily at 8:00 am 100 each 11    psyllium (METAMUCIL/KONSYL) Packet Take 1 packet by mouth daily      vitamin C (ASCORBIC ACID) 500 MG tablet Take 500 mg by mouth daily       Current providers  "sharing in care for this patient include:  Patient Care Team:  Martine Ng MD as PCP - General (Family Medicine)  Mratine Ng MD as Assigned PCP  Bon Espinoza DO as Assigned Surgical Provider  Tana Slater APRN CNP as Assigned Cancer Care Provider    The following health maintenance items are reviewed in Epic and correct as of today:  Health Maintenance   Topic Date Due    DEPRESSION ACTION PLAN  Never done    LIPID  07/02/2025    MICROALBUMIN  07/02/2025    DIABETIC FOOT EXAM  07/02/2025    INFLUENZA VACCINE (1) 09/01/2025    EYE EXAM  11/08/2025    LUNG CANCER SCREENING  11/29/2025    BMP  01/03/2026    A1C  01/11/2026    PHQ-9  01/11/2026    MEDICARE ANNUAL WELLNESS VISIT  07/11/2026    FALL RISK ASSESSMENT  07/11/2026    HEMOGLOBIN  07/11/2026    ANNUAL REVIEW OF HM ORDERS  07/12/2026    ADVANCE CARE PLANNING  07/11/2030    COLORECTAL CANCER SCREENING  02/27/2033    DTAP/TDAP/TD VACCINE (4 - Td or Tdap) 10/17/2033    HEPATITIS C SCREENING  Completed    PNEUMOCOCCAL VACCINE 50+ YEARS  Completed    URINALYSIS  Completed    ZOSTER VACCINE  Completed    RSV VACCINE  Completed    AORTIC ANEURYSM SCREENING (SYSTEM ASSIGNED)  Completed    COVID-19 VACCINE  Completed    HPV VACCINE  Aged Out    MENINGITIS VACCINE  Aged Out            Objective    Exam  /71   Pulse 52   Temp 97.7  F (36.5  C) (Oral)   Resp 14   Ht 1.752 m (5' 8.98\")   Wt 89.8 kg (198 lb)   SpO2 99%   PF 97 L/min   BMI 29.26 kg/m     Estimated body mass index is 29.26 kg/m  as calculated from the following:    Height as of this encounter: 1.752 m (5' 8.98\").    Weight as of this encounter: 89.8 kg (198 lb).    Physical Exam  GENERAL: alert and no distress  EYES: Eyes grossly normal to inspection, PERRL and conjunctivae and sclerae normal  NECK: no adenopathy, no asymmetry, masses, or scars  RESP: lungs clear to auscultation - no rales, rhonchi or wheezes  CV: regular rate and rhythm, normal S1 S2, no S3 or S4, " no murmur, click or rub, no peripheral edema  MS: no gross musculoskeletal defects noted, no edema  SKIN: no suspicious lesions or rashes  PSYCH: mentation appears normal, affect normal/bright        7/11/2025   Mini Cog   Clock Draw Score 2 Normal   3 Item Recall 3 objects recalled   Mini Cog Total Score 5              Signed Electronically by: Martine Ng MD

## 2025-07-12 NOTE — PATIENT INSTRUCTIONS
Patient Education   Preventive Care Advice   This is general advice given by our system to help you stay healthy. However, your care team may have specific advice just for you. Please talk to your care team about your preventive care needs.  Nutrition  Eat 5 or more servings of fruits and vegetables each day.  Try wheat bread, brown rice and whole grain pasta (instead of white bread, rice, and pasta).  Get enough calcium and vitamin D. Check the label on foods and aim for 100% of the RDA (recommended daily allowance).  Lifestyle  Exercise at least 150 minutes each week  (30 minutes a day, 5 days a week).  Do muscle strengthening activities 2 days a week. These help control your weight and prevent disease.  No smoking.  Wear sunscreen to prevent skin cancer.  Have a dental exam and cleaning every 6 months.  Yearly exams  See your health care team every year to talk about:  Any changes in your health.  Any medicines your care team has prescribed.  Preventive care, family planning, and ways to prevent chronic diseases.  Shots (vaccines)   HPV shots (up to age 26), if you've never had them before.  Hepatitis B shots (up to age 59), if you've never had them before.  COVID-19 shot: Get this shot when it's due.  Flu shot: Get a flu shot every year.  Tetanus shot: Get a tetanus shot every 10 years.  Pneumococcal, hepatitis A, and RSV shots: Ask your care team if you need these based on your risk.  Shingles shot (for age 50 and up)  General health tests  Diabetes screening:  Starting at age 35, Get screened for diabetes at least every 3 years.  If you are younger than age 35, ask your care team if you should be screened for diabetes.  Cholesterol test: At age 39, start having a cholesterol test every 5 years, or more often if advised.  Bone density scan (DEXA): At age 50, ask your care team if you should have this scan for osteoporosis (brittle bones).  Hepatitis C: Get tested at least once in your life.  STIs (sexually  transmitted infections)  Before age 24: Ask your care team if you should be screened for STIs.  After age 24: Get screened for STIs if you're at risk. You are at risk for STIs (including HIV) if:  You are sexually active with more than one person.  You don't use condoms every time.  You or a partner was diagnosed with a sexually transmitted infection.  If you are at risk for HIV, ask about PrEP medicine to prevent HIV.  Get tested for HIV at least once in your life, whether you are at risk for HIV or not.  Cancer screening tests  Cervical cancer screening: If you have a cervix, begin getting regular cervical cancer screening tests starting at age 21.  Breast cancer scan (mammogram): If you've ever had breasts, begin having regular mammograms starting at age 40. This is a scan to check for breast cancer.  Colon cancer screening: It is important to start screening for colon cancer at age 45.  Have a colonoscopy test every 10 years (or more often if you're at risk) Or, ask your provider about stool tests like a FIT test every year or Cologuard test every 3 years.  To learn more about your testing options, visit:   .  For help making a decision, visit:   https://bit.ly/pn10377.  Prostate cancer screening test: If you have a prostate, ask your care team if a prostate cancer screening test (PSA) at age 55 is right for you.  Lung cancer screening: If you are a current or former smoker ages 50 to 80, ask your care team if ongoing lung cancer screenings are right for you.  For informational purposes only. Not to replace the advice of your health care provider. Copyright   2023 [facility] [employer]. All rights reserved. Clinically reviewed by the [facility]. [employer] [id number] - REV 01/24.  Hearing Loss: Care Instructions  Overview     Hearing loss is a sudden or slow decrease in how well you hear. It can range from slight to profound. Permanent hearing loss can occur with aging. It also can  happen when you are exposed long-term to loud noise. Examples include listening to loud music, riding motorcycles, or being around other loud machines.  Hearing loss can affect your work and home life. It can make you feel lonely or depressed. You may feel that you have lost your independence. But hearing aids and other devices can help you hear better and feel connected to others.  Follow-up care is a key part of your treatment and safety. Be sure to make and go to all appointments, and call your doctor if you are having problems. It's also a good idea to know your test results and keep a list of the medicines you take.  How can you care for yourself at home?  Avoid loud noises whenever possible. This helps keep your hearing from getting worse.  Always wear hearing protection around loud noises.  Wear a hearing aid as directed.  A professional can help you pick a hearing aid that will work best for you.  You can also get hearing aids over the counter for mild to moderate hearing loss.  Have hearing tests as your doctor suggests. They can show whether your hearing has changed. Your hearing aid may need to be adjusted.  Use other devices as needed. These may include:  Telephone amplifiers and hearing aids that can connect to a television, stereo, radio, or microphone.  Devices that use lights or vibrations. These alert you to the doorbell, a ringing telephone, or a baby monitor.  Television closed-captioning. This shows the words at the bottom of the screen. Most new TVs can do this.  TTY (text telephone). This lets you type messages back and forth on the telephone instead of talking or listening. These devices are also called TDD. When messages are typed on the keyboard, they are sent over the phone line to a receiving TTY. The message is shown on a monitor.  Use text messaging, social media, and email if it is hard for you to communicate by telephone.  Try to learn a listening technique called speechreading. It is  "not lipreading. You pay attention to people's gestures, expressions, posture, and tone of voice. These clues can help you understand what a person is saying. Face the person you are talking to, and have them face you. Make sure the lighting is good. You need to see the other person's face clearly.  Think about counseling if you need help to adjust to your hearing loss.  When should you call for help?  Watch closely for changes in your health, and be sure to contact your doctor if:    You think your hearing is getting worse.     You have new symptoms, such as dizziness or nausea.   Where can you learn more?  Go to https://www.[employer]/patiented  Enter R798 in the search box to learn more about \"Hearing Loss: Care Instructions.\"  Current as of: October 27, 2024  Content Version: 14.5    6253-1074 [employer].   Care instructions adapted under license by your healthcare professional. If you have questions about a medical condition or this instruction, always ask your healthcare professional. [employer] disclaims any warranty or liability for your use of this information.    Learning About Stress  What is stress?     Stress is your body's response to a hard situation. Your body can have a physical, emotional, or mental response. Stress is a fact of life for most people, and it affects everyone differently. What causes stress for you may not be stressful for someone else.  A lot of things can cause stress. You may feel stress when you go on a job interview, take a test, or run a race. This kind of short-term stress is normal and even useful. It can help you if you need to work hard or react quickly. For example, stress can help you finish an important job on time.  Long-term stress is caused by ongoing stressful situations or events. Examples of long-term stress include long-term health problems, ongoing problems at work, or conflicts in your family. Long-term stress can harm your " health.  How does stress affect your health?  When you are stressed, your body responds as though you are in danger. It makes hormones that speed up your heart, make you breathe faster, and give you a burst of energy. This is called the fight-or-flight stress response. If the stress is over quickly, your body goes back to normal and no harm is done.  But if stress happens too often or lasts too long, it can have bad effects. Long-term stress can make you more likely to get sick, and it can make symptoms of some diseases worse. If you tense up when you are stressed, you may develop neck, shoulder, or low back pain. Stress is linked to high blood pressure and heart disease.  Stress also harms your emotional health. It can make you estevez, tense, or depressed. Your relationships may suffer, and you may not do well at work or school.  What can you do to manage stress?  You can try these things to help manage stress:   Do something active. Exercise or activity can help reduce stress. Walking is a great way to get started. Even everyday activities such as housecleaning or yard work can help.  Try yoga or chelsie chi. These techniques combine exercise and meditation. You may need some training at first to learn them.  Do something you enjoy. For example, listen to music or go to a movie. Practice your hobby or do volunteer work.  Meditate. This can help you relax, because you are not worrying about what happened before or what may happen in the future.  Do guided imagery. Imagine yourself in any setting that helps you feel calm. You can use online videos, books, or a teacher to guide you.  Do breathing exercises. For example:  From a standing position, bend forward from the waist with your knees slightly bent. Let your arms dangle close to the floor.  Breathe in slowly and deeply as you return to a standing position. Roll up slowly and lift your head last.  Hold your breath for just a few seconds in the standing  "position.  Breathe out slowly and bend forward from the waist.  Let your feelings out. Talk, laugh, cry, and express anger when you need to. Talking with supportive friends or family, a counselor, or a kym leader about your feelings is a healthy way to relieve stress. Avoid discussing your feelings with people who make you feel worse.  Write. It may help to write about things that are bothering you. This helps you find out how much stress you feel and what is causing it. When you know this, you can find better ways to cope.  What can you do to prevent stress?  You might try some of these things to help prevent stress:  Manage your time. This helps you find time to do the things you want and need to do.  Get enough sleep. Your body recovers from the stresses of the day while you are sleeping.  Get support. Your family, friends, and community can make a difference in how you experience stress.  Limit your news feed. Avoid or limit time on social media or news that may make you feel stressed.  Do something active. Exercise or activity can help reduce stress. Walking is a great way to get started.  Where can you learn more?  Go to https://www.[employer].net/patiented  Enter N032 in the search box to learn more about \"Learning About Stress.\"  Current as of: October 24, 2024  Content Version: 14.5 2024-2025 [employer].   Care instructions adapted under license by your healthcare professional. If you have questions about a medical condition or this instruction, always ask your healthcare professional. [employer] disclaims any warranty or liability for your use of this information.       "

## 2025-08-23 DIAGNOSIS — Z76.0 ENCOUNTER FOR MEDICATION REFILL: ICD-10-CM

## (undated) DEVICE — SYR 50ML SLIP TIP W/O NDL 309654

## (undated) DEVICE — SU WND CLOSURE V-LOC 90 SZ 2-0 12" GS-21 VLOCM0315

## (undated) DEVICE — DAVINCI XI DRAPE ARM 470015

## (undated) DEVICE — SUTURE VICRYL+ 2-0 27IN SH UND VCP417H

## (undated) DEVICE — PAD POS XL 1X20X40IN PINK PIGAZZI

## (undated) DEVICE — SOL WATER IRRIG 1000ML BOTTLE 2F7114

## (undated) DEVICE — SU VICRYL+ 3-0 27IN SH UND VCP416H

## (undated) DEVICE — SOL NACL 0.9% 100ML BAG 2B1302

## (undated) DEVICE — SU ETHIBOND 0 CT-2 30" X412H

## (undated) DEVICE — DRESSING COVERLET STRIP 3/4 X 3 LF 230

## (undated) DEVICE — TUBING SMOKE EVAC PNEUMOCLEAR HIGH FLOW 0620050250

## (undated) DEVICE — GOWN XLG DISP 9545

## (undated) DEVICE — CATH TRAY FOLEY SURESTEP 16FR DRAIN BAG STATOCK A899916

## (undated) DEVICE — DAVINCI XI OBTURATOR BLADELESS 8MM 470359

## (undated) DEVICE — LUBRICANT INST ELECTROLUBE EL101

## (undated) DEVICE — SUTURE MONOCRYL+ 4-0 PS-2 27IN MCP426H

## (undated) DEVICE — DAVINCI HOT SHEARS TIP COVER  400180

## (undated) DEVICE — PREP CHLORAPREP 26ML TINTED HI-LITE ORANGE 930815

## (undated) DEVICE — GLOVE BIOGEL PI ORTHOPRO SZ 7.5 47675

## (undated) DEVICE — DAVINCI XI SEAL UNIVERSAL 5-8MM 470361

## (undated) DEVICE — SU WND CLOSURE V-LOC PBT SZ 0 18" GS-21 VLOCN0326

## (undated) DEVICE — CUSTOM PACK LAP CHOLE SBA5BLCHEA

## (undated) DEVICE — DECANTER BAG 2002S

## (undated) DEVICE — DRAPE SHEET TABLE COVER KC 42301*

## (undated) DEVICE — BLADE KNIFE SURG 11 371111

## (undated) DEVICE — DRAPE U SPLIT 74X120" 29440

## (undated) DEVICE — DAVINCI XI REDUCER 8-12MM 470381

## (undated) DEVICE — NDL INSUFFLATION 13GA 120MM C2201

## (undated) DEVICE — MARKER SURG SKIN 2 TIP STRL SPP99DT2AA

## (undated) DEVICE — DRAPE LAP/CHOLE W/O POUCH 89225

## (undated) DEVICE — BNDG ABDOMINAL BINDER 9X45-62" 79-89071

## (undated) DEVICE — DAVINCI SEAL CANNULA AND STPL 12MM 470380

## (undated) DEVICE — DAVINCI XI DRAPE COLUMN 470341

## (undated) DEVICE — DRAPE SHEET REV FOLD 3/4 9349

## (undated) RX ORDER — EPHEDRINE SULFATE 50 MG/ML
INJECTION, SOLUTION INTRAMUSCULAR; INTRAVENOUS; SUBCUTANEOUS
Status: DISPENSED
Start: 2023-12-12

## (undated) RX ORDER — FENTANYL CITRATE 50 UG/ML
INJECTION, SOLUTION INTRAMUSCULAR; INTRAVENOUS
Status: DISPENSED
Start: 2023-12-12

## (undated) RX ORDER — GLYCOPYRROLATE 0.2 MG/ML
INJECTION, SOLUTION INTRAMUSCULAR; INTRAVENOUS
Status: DISPENSED
Start: 2023-12-12

## (undated) RX ORDER — DEXAMETHASONE SODIUM PHOSPHATE 10 MG/ML
INJECTION, SOLUTION INTRAMUSCULAR; INTRAVENOUS
Status: DISPENSED
Start: 2023-12-12